# Patient Record
Sex: MALE | Race: WHITE | NOT HISPANIC OR LATINO | Employment: STUDENT | URBAN - METROPOLITAN AREA
[De-identification: names, ages, dates, MRNs, and addresses within clinical notes are randomized per-mention and may not be internally consistent; named-entity substitution may affect disease eponyms.]

---
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
## 2020-02-16 ENCOUNTER — HEALTH MAINTENANCE LETTER (OUTPATIENT)
Age: 20
End: 2020-02-16

## 2020-08-04 ENCOUNTER — OFFICE VISIT (OUTPATIENT)
Dept: FAMILY MEDICINE | Facility: CLINIC | Age: 20
End: 2020-08-04
Payer: COMMERCIAL

## 2020-08-04 VITALS
DIASTOLIC BLOOD PRESSURE: 72 MMHG | TEMPERATURE: 98.2 F | HEART RATE: 82 BPM | OXYGEN SATURATION: 98 % | WEIGHT: 171 LBS | HEIGHT: 70 IN | BODY MASS INDEX: 24.48 KG/M2 | SYSTOLIC BLOOD PRESSURE: 134 MMHG

## 2020-08-04 DIAGNOSIS — Z00.00 ROUTINE GENERAL MEDICAL EXAMINATION AT A HEALTH CARE FACILITY: Primary | ICD-10-CM

## 2020-08-04 PROCEDURE — 99395 PREV VISIT EST AGE 18-39: CPT | Performed by: FAMILY MEDICINE

## 2020-08-04 ASSESSMENT — ENCOUNTER SYMPTOMS
ENDOCRINE NEGATIVE: 1
CARDIOVASCULAR NEGATIVE: 1
EYES NEGATIVE: 1
PSYCHIATRIC NEGATIVE: 1
NEUROLOGICAL NEGATIVE: 1
ALLERGIC/IMMUNOLOGIC NEGATIVE: 1
CONSTITUTIONAL NEGATIVE: 1
HEMATOLOGIC/LYMPHATIC NEGATIVE: 1
GASTROINTESTINAL NEGATIVE: 1
RESPIRATORY NEGATIVE: 1
BACK PAIN: 1

## 2020-08-04 ASSESSMENT — MIFFLIN-ST. JEOR: SCORE: 1786.9

## 2020-08-04 ASSESSMENT — PAIN SCALES - GENERAL: PAINLEVEL: NO PAIN (0)

## 2020-08-04 NOTE — PROGRESS NOTES
SUBJECTIVE:   CC: Ravinder Bardales is an 19 year old male who presents for preventative health visit.   Comes for routine physical, sports physical.  He will be joining college he would be doing baseball.    He denies any history or family history of heart disease, has no chest pain, no short of breath.  Does not smoke.  Is very active.    Denies depression, no suicidal thoughts or ideation.  Denies alcohol abuse or drugs abuse.  He has no joint pain, no previous history of head injury or concussion    Healthy Habits:     Getting at least 3 servings of Calcium per day:  Yes    Bi-annual eye exam:  NO    Dental care twice a year:  Yes    Sleep apnea or symptoms of sleep apnea:  None    Diet:  Regular (no restrictions)    Frequency of exercise:  6-7 days/week    Duration of exercise:  Greater than 60 minutes    Taking medications regularly:  No    Barriers to taking medications:  None    Medication side effects:  None    PHQ-2 Total Score: 0    Additional concerns today:  Yes (Forms)    Today's PHQ-2 Score:   PHQ-2 ( 2000 [employer]) 8/4/2020   Q1: Little interest or pleasure in doing things 0   Q2: Feeling down, depressed or hopeless 0   PHQ-2 Score 0   Q1: Little interest or pleasure in doing things -   Q2: Feeling down, depressed or hopeless -   PHQ-2 Score -       Abuse: Current or Past(Physical, Sexual or Emotional)- No  Do you feel safe in your environment? No        Social History     Tobacco Use     Smoking status: Never Smoker     Smokeless tobacco: Never Used   Substance Use Topics     Alcohol use: No     If you drink alcohol do you typically have >3 drinks per day or >7 drinks per week? No    Alcohol Use 8/14/2018   Prescreen: >3 drinks/day or >7 drinks/week? Not Applicable   No flowsheet data found.    Last PSA: No results found for: PSA    Reviewed orders with patient. Reviewed health maintenance and updated orders accordingly - Yes  Patient Active Problem List   Diagnosis     Lymphadenopathy     Left knee pain      Past Surgical History:   Procedure Laterality Date     APPENDECTOMY       ARTHROSCOPY KNEE Left 11/6/2015    Procedure: ARTHROSCOPY KNEE;  Surgeon: Koko Babb MD;  [facility]     ARTHROSCOPY KNEE WITH FIXATION OF OSTEOCHONDRAL DISSECANS Left 3/17/2015    Procedure: ARTHROSCOPY KNEE WITH FIXATION OSTEOCHONDRITIS DESSICANS;  Surgeon: Koko Babb MD;  [facility]     NO HISTORY OF SURGERY       ORTHOPEDIC SURGERY       REMOVE HARDWARE KNEE Left 11/6/2015    Procedure: REMOVE HARDWARE KNEE;  Surgeon: Koko Babb MD;  [facility]       Social History     Tobacco Use     Smoking status: Never Smoker     Smokeless tobacco: Never Used   Substance Use Topics     Alcohol use: No     Family History   Problem Relation Age of Onset     Cancer Maternal Grandmother         liver cancer     Diabetes No family hx of      Hypertension No family hx of          Current Outpatient Medications   Medication Sig Dispense Refill     triamcinolone (KENALOG) 0.1 % cream Apply sparingly to affected area three times daily as needed (Patient not taking: Reported on 8/4/2020) 80 g 0     No Known Allergies    Reviewed and updated as needed this visit by clinical staff  Tobacco  Allergies  Meds  Med Hx  Surg Hx  Fam Hx  Soc Hx        Reviewed and updated as needed this visit by Provider        Past Medical History:   Diagnosis Date     Gestation period, 38 weeks       Past Surgical History:   Procedure Laterality Date     APPENDECTOMY       ARTHROSCOPY KNEE Left 11/6/2015    Procedure: ARTHROSCOPY KNEE;  Surgeon: Koko Babb MD;  [facility]     ARTHROSCOPY KNEE WITH FIXATION OF OSTEOCHONDRAL DISSECANS Left 3/17/2015    Procedure: ARTHROSCOPY KNEE WITH FIXATION OSTEOCHONDRITIS DESSICANS;  Surgeon: Koko Babb MD;  [facility]     NO HISTORY OF SURGERY       ORTHOPEDIC SURGERY       REMOVE HARDWARE KNEE Left 11/6/2015    Procedure:  "REMOVE HARDWARE KNEE;  Surgeon: Koko Babb MD;  [facility]     OB History   No obstetric history on file.       Review of Systems   Constitutional: Negative.    HENT: Negative.    Eyes: Negative.    Respiratory: Negative.    Cardiovascular: Negative.    Gastrointestinal: Negative.    Endocrine: Negative.    Genitourinary: Negative.    Musculoskeletal: Positive for back pain.   Skin: Negative.    Allergic/Immunologic: Negative.    Neurological: Negative.    Hematological: Negative.    Psychiatric/Behavioral: Negative.      CONSTITUTIONAL: NEGATIVE for fever, chills, change in weight  INTEGUMENTARY/SKIN: NEGATIVE for worrisome rashes, moles or lesions  EYES: NEGATIVE for vision changes or irritation  ENT: NEGATIVE for ear, mouth and throat problems  RESP: NEGATIVE for significant cough or SOB  CV: NEGATIVE for chest pain, palpitations or peripheral edema  GI: NEGATIVE for nausea, abdominal pain, heartburn, or change in bowel habits   male: negative for dysuria, hematuria, decreased urinary stream, erectile dysfunction, urethral discharge  MUSCULOSKELETAL: NEGATIVE for significant arthralgias or myalgia  NEURO: NEGATIVE for weakness, dizziness or paresthesias  ENDOCRINE: NEGATIVE for temperature intolerance, skin/hair changes  HEME/ALLERGY/IMMUNE: NEGATIVE for bleeding problems  PSYCHIATRIC: NEGATIVE for changes in mood or affect    OBJECTIVE:   /72 (BP Location: Right arm, Patient Position: Chair, Cuff Size: Adult Large)   Pulse 82   Temp 98.2  F (36.8  C) (Oral)   Ht 1.77 m (5' 9.69\")   Wt 77.6 kg (171 lb)   SpO2 98%   BMI 24.76 kg/m      Physical Exam  GENERAL: healthy, alert and no distress  EYES: Eyes grossly normal to inspection, PERRL and conjunctivae and sclerae normal  HENT: ear canals and TM's normal, nose and mouth without ulcers or lesions  NECK: no adenopathy, no asymmetry, masses, or scars and thyroid normal to palpation  RESP: lungs clear to auscultation - no rales, " "rhonchi or wheezes  CV: regular rate and rhythm, normal S1 S2, no S3 or S4, no murmur, click or rub, no peripheral edema and peripheral pulses strong  ABDOMEN: soft, nontender, no hepatosplenomegaly, no masses and bowel sounds normal   (male): normal male genitalia without lesions or urethral discharge, no hernia  MS: no gross musculoskeletal defects noted, no edema  SKIN: no suspicious lesions or rashes  NEURO: Normal strength and tone, mentation intact and speech normal  PSYCH: mentation appears normal, affect normal/bright  LYMPH: no cervical, supraclavicular, axillary, or inguinal adenopathy    Diagnostic Test Results:  Labs reviewed in Epic  none     ASSESSMENT/PLAN:       ICD-10-CM    1. Routine general medical examination at a health care facility  Z00.00    Normal physical exam,    I did fill his form.    COUNSELING:   Reviewed preventive health counseling, as reflected in patient instructions       Regular exercise       Healthy diet/nutrition       Vision screening       Safe sex practices/STD prevention    Estimated body mass index is 24.76 kg/m  as calculated from the following:    Height as of this encounter: 1.77 m (5' 9.69\").    Weight as of this encounter: 77.6 kg (171 lb).     Weight management plan: Discussed healthy diet and exercise guidelines     reports that he has never smoked. He has never used smokeless tobacco.      Counseling Resources:  ATP IV Guidelines  Pooled Cohorts Equation Calculator  FRAX Risk Assessment  ICSI Preventive Guidelines  Dietary Guidelines for Americans, 2010  USDA's MyPlate  ASA Prophylaxis  Lung CA Screening    Jerson Husain MD  [facility]  "

## 2020-11-29 ENCOUNTER — HEALTH MAINTENANCE LETTER (OUTPATIENT)
Age: 20
End: 2020-11-29

## 2021-07-06 ENCOUNTER — APPOINTMENT (OUTPATIENT)
Dept: MRI IMAGING | Facility: CLINIC | Age: 21
DRG: 065 | End: 2021-07-06
Attending: EMERGENCY MEDICINE
Payer: COMMERCIAL

## 2021-07-06 ENCOUNTER — HOSPITAL ENCOUNTER (INPATIENT)
Facility: CLINIC | Age: 21
LOS: 2 days | Discharge: HOME | DRG: 065 | End: 2021-07-08
Attending: EMERGENCY MEDICINE | Admitting: HOSPITALIST
Payer: COMMERCIAL

## 2021-07-06 ENCOUNTER — APPOINTMENT (OUTPATIENT)
Dept: CARDIOLOGY | Facility: CLINIC | Age: 21
DRG: 065 | End: 2021-07-06
Attending: EMERGENCY MEDICINE
Payer: COMMERCIAL

## 2021-07-06 DIAGNOSIS — I63.9 CEREBROVASCULAR ACCIDENT (CVA), UNSPECIFIED MECHANISM (H): ICD-10-CM

## 2021-07-06 LAB
ALBUMIN SERPL-MCNC: 4 G/DL (ref 3.4–5)
ALP SERPL-CCNC: 83 U/L (ref 40–150)
ALT SERPL W P-5'-P-CCNC: 39 U/L (ref 0–70)
AMPHETAMINES UR QL SCN: NEGATIVE
ANION GAP SERPL CALCULATED.3IONS-SCNC: 1 MMOL/L (ref 3–14)
AST SERPL W P-5'-P-CCNC: 23 U/L (ref 0–45)
BARBITURATES UR QL: NEGATIVE
BASOPHILS # BLD AUTO: 0 10E9/L (ref 0–0.2)
BASOPHILS NFR BLD AUTO: 0.4 %
BENZODIAZ UR QL: NEGATIVE
BILIRUB SERPL-MCNC: 2.4 MG/DL (ref 0.2–1.3)
BUN SERPL-MCNC: 12 MG/DL (ref 7–30)
CALCIUM SERPL-MCNC: 9.2 MG/DL (ref 8.5–10.1)
CANNABINOIDS UR QL SCN: NEGATIVE
CHLORIDE SERPL-SCNC: 107 MMOL/L (ref 94–109)
CHOLEST SERPL-MCNC: 127 MG/DL
CO2 SERPL-SCNC: 30 MMOL/L (ref 20–32)
COCAINE UR QL: NEGATIVE
CREAT SERPL-MCNC: 1.25 MG/DL (ref 0.66–1.25)
CRP SERPL-MCNC: <2.9 MG/L (ref 0–8)
D DIMER PPP FEU-MCNC: 0.3 UG/ML FEU (ref 0–0.5)
DIFFERENTIAL METHOD BLD: NORMAL
EOSINOPHIL # BLD AUTO: 0 10E9/L (ref 0–0.7)
EOSINOPHIL NFR BLD AUTO: 0.6 %
ERYTHROCYTE [DISTWIDTH] IN BLOOD BY AUTOMATED COUNT: 13.1 % (ref 10–15)
ERYTHROCYTE [SEDIMENTATION RATE] IN BLOOD BY WESTERGREN METHOD: 3 MM/H (ref 0–15)
GFR SERPL CREATININE-BSD FRML MDRD: 81 ML/MIN/{1.73_M2}
GLUCOSE BLDC GLUCOMTR-MCNC: 106 MG/DL (ref 70–99)
GLUCOSE BLDC GLUCOMTR-MCNC: 130 MG/DL (ref 70–99)
GLUCOSE SERPL-MCNC: 102 MG/DL (ref 70–99)
HBA1C MFR BLD: 5.2 % (ref 0–5.6)
HCT VFR BLD AUTO: 48.7 % (ref 40–53)
HDLC SERPL-MCNC: 58 MG/DL
HGB BLD-MCNC: 16.2 G/DL (ref 13.3–17.7)
IMM GRANULOCYTES # BLD: 0 10E9/L (ref 0–0.4)
IMM GRANULOCYTES NFR BLD: 0.4 %
INTERPRETATION ECG - MUSE: NORMAL
LABORATORY COMMENT REPORT: NORMAL
LDLC SERPL CALC-MCNC: 50 MG/DL
LVEF ECHO: NORMAL
LVEF ECHO: NORMAL
LYMPHOCYTES # BLD AUTO: 1.3 10E9/L (ref 0.8–5.3)
LYMPHOCYTES NFR BLD AUTO: 25.1 %
MCH RBC QN AUTO: 30.3 PG (ref 26.5–33)
MCHC RBC AUTO-ENTMCNC: 33.3 G/DL (ref 31.5–36.5)
MCV RBC AUTO: 91 FL (ref 78–100)
MONOCYTES # BLD AUTO: 0.3 10E9/L (ref 0–1.3)
MONOCYTES NFR BLD AUTO: 5.7 %
NEUTROPHILS # BLD AUTO: 3.6 10E9/L (ref 1.6–8.3)
NEUTROPHILS NFR BLD AUTO: 67.8 %
NONHDLC SERPL-MCNC: 69 MG/DL
NRBC # BLD AUTO: 0 10*3/UL
NRBC BLD AUTO-RTO: 0 /100
OPIATES UR QL SCN: NEGATIVE
PCP UR QL SCN: NEGATIVE
PLATELET # BLD AUTO: 247 10E9/L (ref 150–450)
POTASSIUM SERPL-SCNC: 4 MMOL/L (ref 3.4–5.3)
PROT SERPL-MCNC: 7.3 G/DL (ref 6.8–8.8)
RBC # BLD AUTO: 5.34 10E12/L (ref 4.4–5.9)
SARS-COV-2 RNA RESP QL NAA+PROBE: NEGATIVE
SODIUM SERPL-SCNC: 138 MMOL/L (ref 133–144)
SPECIMEN SOURCE: NORMAL
TRIGL SERPL-MCNC: 93 MG/DL
TROPONIN I SERPL-MCNC: <0.015 UG/L (ref 0–0.04)
TROPONIN I SERPL-MCNC: <0.015 UG/L (ref 0–0.04)
TSH SERPL DL<=0.005 MIU/L-ACNC: 1.1 MU/L (ref 0.4–4)
WBC # BLD AUTO: 5.3 10E9/L (ref 4–11)

## 2021-07-06 PROCEDURE — 80053 COMPREHEN METABOLIC PANEL: CPT | Performed by: EMERGENCY MEDICINE

## 2021-07-06 PROCEDURE — 93005 ELECTROCARDIOGRAM TRACING: CPT

## 2021-07-06 PROCEDURE — 84484 ASSAY OF TROPONIN QUANT: CPT | Performed by: HOSPITALIST

## 2021-07-06 PROCEDURE — 84484 ASSAY OF TROPONIN QUANT: CPT | Performed by: EMERGENCY MEDICINE

## 2021-07-06 PROCEDURE — 99285 EMERGENCY DEPT VISIT HI MDM: CPT | Mod: 25

## 2021-07-06 PROCEDURE — 85652 RBC SED RATE AUTOMATED: CPT | Performed by: EMERGENCY MEDICINE

## 2021-07-06 PROCEDURE — 85025 COMPLETE CBC W/AUTO DIFF WBC: CPT | Performed by: EMERGENCY MEDICINE

## 2021-07-06 PROCEDURE — 80307 DRUG TEST PRSMV CHEM ANLYZR: CPT | Performed by: EMERGENCY MEDICINE

## 2021-07-06 PROCEDURE — 93306 TTE W/DOPPLER COMPLETE: CPT | Mod: 26 | Performed by: INTERNAL MEDICINE

## 2021-07-06 PROCEDURE — 99223 1ST HOSP IP/OBS HIGH 75: CPT | Mod: AI | Performed by: HOSPITALIST

## 2021-07-06 PROCEDURE — A9585 GADOBUTROL INJECTION: HCPCS | Performed by: EMERGENCY MEDICINE

## 2021-07-06 PROCEDURE — 85379 FIBRIN DEGRADATION QUANT: CPT | Performed by: HOSPITALIST

## 2021-07-06 PROCEDURE — 72141 MRI NECK SPINE W/O DYE: CPT

## 2021-07-06 PROCEDURE — 999N000208 ECHOCARDIOGRAM COMPLETE

## 2021-07-06 PROCEDURE — 120N000001 HC R&B MED SURG/OB

## 2021-07-06 PROCEDURE — 80061 LIPID PANEL: CPT | Performed by: EMERGENCY MEDICINE

## 2021-07-06 PROCEDURE — 70549 MR ANGIOGRAPH NECK W/O&W/DYE: CPT

## 2021-07-06 PROCEDURE — 83036 HEMOGLOBIN GLYCOSYLATED A1C: CPT | Performed by: EMERGENCY MEDICINE

## 2021-07-06 PROCEDURE — 70553 MRI BRAIN STEM W/O & W/DYE: CPT

## 2021-07-06 PROCEDURE — C9803 HOPD COVID-19 SPEC COLLECT: HCPCS

## 2021-07-06 PROCEDURE — 36415 COLL VENOUS BLD VENIPUNCTURE: CPT | Performed by: HOSPITALIST

## 2021-07-06 PROCEDURE — 86140 C-REACTIVE PROTEIN: CPT | Performed by: EMERGENCY MEDICINE

## 2021-07-06 PROCEDURE — 255N000002 HC RX 255 OP 636: Performed by: EMERGENCY MEDICINE

## 2021-07-06 PROCEDURE — 87635 SARS-COV-2 COVID-19 AMP PRB: CPT | Performed by: EMERGENCY MEDICINE

## 2021-07-06 PROCEDURE — 250N000013 HC RX MED GY IP 250 OP 250 PS 637: Performed by: EMERGENCY MEDICINE

## 2021-07-06 PROCEDURE — 84443 ASSAY THYROID STIM HORMONE: CPT | Performed by: EMERGENCY MEDICINE

## 2021-07-06 PROCEDURE — 999N001017 HC STATISTIC GLUCOSE BY METER IP

## 2021-07-06 PROCEDURE — 70544 MR ANGIOGRAPHY HEAD W/O DYE: CPT

## 2021-07-06 RX ORDER — ASPIRIN 300 MG/1
300 SUPPOSITORY RECTAL DAILY
Status: DISCONTINUED | OUTPATIENT
Start: 2021-07-07 | End: 2021-07-08 | Stop reason: HOSPADM

## 2021-07-06 RX ORDER — ACETAMINOPHEN 325 MG/1
650 TABLET ORAL EVERY 4 HOURS PRN
Status: DISCONTINUED | OUTPATIENT
Start: 2021-07-06 | End: 2021-07-06

## 2021-07-06 RX ORDER — GADOBUTROL 604.72 MG/ML
8 INJECTION INTRAVENOUS ONCE
Status: COMPLETED | OUTPATIENT
Start: 2021-07-06 | End: 2021-07-06

## 2021-07-06 RX ORDER — ASPIRIN 325 MG
325 TABLET ORAL ONCE
Status: COMPLETED | OUTPATIENT
Start: 2021-07-06 | End: 2021-07-06

## 2021-07-06 RX ORDER — LIDOCAINE 40 MG/G
CREAM TOPICAL
Status: DISCONTINUED | OUTPATIENT
Start: 2021-07-06 | End: 2021-07-08 | Stop reason: HOSPADM

## 2021-07-06 RX ORDER — ACETAMINOPHEN 325 MG/1
650 TABLET ORAL EVERY 4 HOURS PRN
Status: DISCONTINUED | OUTPATIENT
Start: 2021-07-06 | End: 2021-07-08 | Stop reason: HOSPADM

## 2021-07-06 RX ADMIN — ASPIRIN 325 MG ORAL TABLET 325 MG: 325 PILL ORAL at 15:40

## 2021-07-06 RX ADMIN — GADOBUTROL 8 ML: 604.72 INJECTION INTRAVENOUS at 13:35

## 2021-07-06 ASSESSMENT — ACTIVITIES OF DAILY LIVING (ADL): ADLS_ACUITY_SCORE: 12

## 2021-07-06 ASSESSMENT — MIFFLIN-ST. JEOR: SCORE: 1826.82

## 2021-07-06 ASSESSMENT — ENCOUNTER SYMPTOMS
NUMBNESS: 1
SPEECH DIFFICULTY: 1
WEAKNESS: 1
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
## 2021-07-06 NOTE — CONSULTS
"    [facility]    Stroke Telephone Note    I was called by Rosemary Perry on 07/06/21 regarding patient Ravinder Bardales. The patient is a 20 year old male patient started having arm numbness and weakness on Saturday followed by difficulty with left arm on Sunday and some vision changes as well as word finding changes over the last several days.    Imaging Findings   pending    Impression  Pending  Potential for demyelinating process vs stroke    Recommendations   MRI neuro axis with and without contrast  If no acute or recent infarct, general neurology to follow    My recommendations are based on the information provided over the phone by Ravinder Bardales's in-person providers. They are not intended to replace the clinical judgment of his in-person providers. I was not requested to personally see or examine the patient at this time.    The Stroke Staff is Dr. Patricia.    Kodi Egan MD  Vascular Neurology Fellow  To page me or covering stroke neurology team member, click here: [facility]   Choose \"On Call\" tab at top, then search dropdown box for \"Neurology Adult\", select location, press Enter, then look for stroke/neuro ICU/telestroke.         "

## 2021-07-06 NOTE — PROGRESS NOTES
RECEIVING UNIT ED HANDOFF REVIEW    ED Nurse Handoff Report was reviewed by: Elaine Steven RN on July 6, 2021 at 4:59 PM

## 2021-07-06 NOTE — ED NOTES
[facility]  ED Nurse Handoff Report    ED Chief complaint: Chest Pain (chest tightness for past 2 weeks across middle of chest, better with a deep breath, patient relating it to weight lifting) and Extremity Weakness (on Friday had episodic numbness of right arm, visual blurriness.  hx of arm issues related to being a )      ED Diagnosis:   Final diagnoses:   Cerebrovascular accident (CVA), unspecified mechanism (H)       Code Status: Full Code    Allergies: No Known Allergies    Patient Story: Presents with arm numbness and weakness since Saturday, reported episode of aphasia.  Focused Assessment:  Alert and oriented x4. IV in L arm. In room air. Reports R sided extremity numbness. Independent with ADLs.    Labs Ordered and Resulted from Time of ED Arrival Up to the Time of Departure from the ED   GLUCOSE BY METER - Abnormal; Notable for the following components:       Result Value    Glucose 106 (*)     All other components within normal limits   COMPREHENSIVE METABOLIC PANEL - Abnormal; Notable for the following components:    Anion Gap 1 (*)     Glucose 102 (*)     Bilirubin Total 2.4 (*)     All other components within normal limits   CBC WITH PLATELETS DIFFERENTIAL   TROPONIN I   DRUG ABUSE SCREEN 77 URINE (FL, RH, SH)   ERYTHROCYTE SEDIMENTATION RATE AUTO   CRP INFLAMMATION   SARS-COV-2 (COVID-19) VIRUS RT-PCR     MRA Neck (Carotids) wo & w Contrast   Final Result   IMPRESSION:  Normal MR angiogram of the neck.          MARIA ANTONIA FALL MD            SYSTEM ID:  [id number]      MR Brain w/o & w Contrast   Final Result   IMPRESSION:     1. Several areas of abnormal cortical restricted diffusion in the left   cerebral hemisphere most suggestive of cortical infarcts. No evidence   of hemorrhagic transformation of infarct. No mass effect or midline   shift. No restricted diffusion in the right cerebral hemisphere.   2. Aside from the areas of apparent cortical infarcts, no other signal    abnormality within the brain parenchyma.         MARIA ANTONIA FALL MD            SYSTEM ID:  [id number]      MRA Brain ([place] [facility] [place]) wo Contrast   Final Result   IMPRESSION:  Normal MR angiogram of the head.           MARIA ANTONIA FALL MD            SYSTEM ID:  [id number]      MR Cervical Spine w/o Contrast   Final Result   IMPRESSION:     1. Mild degenerative disc changes in the mid cervical spine as   detailed above.   2. Mild right neural foraminal narrowing at C4-C5. No spinal canal   narrowing at any level.   3. No abnormal signal appreciated within the cervical spinal cord.          MARIA ANTONIA FALL MD            SYSTEM ID:  [id number]      Echocardiogram Complete    (Results Pending)         Treatments and/or interventions provided: Monitoring  Patient's response to treatments and/or interventions: Tolerated well    To be done/followed up on inpatient unit:  Continue with plan of care per admitting MD.    Does this patient have any cognitive concerns?: N/A    Activity level - Baseline/Home:  Independent  Activity Level - Current:   Independent    Patient's Preferred language: English   Needed?: No    Isolation: None  Infection: Not Applicable  Patient tested for COVID 19 prior to admission: YES  Bariatric?: No    Vital Signs:   Vitals:    07/06/21 1230 07/06/21 1245 07/06/21 1300 07/06/21 1315   BP: 134/69  134/76    Pulse: 58 61 60 59   Resp: 15 19  12   Temp:       TempSrc:       SpO2: 99% 99% 100% 99%   Weight:       Height:           Cardiac Rhythm:     Was the PSS-3 completed:   Yes  What interventions are required if any?               Family Comments: No family at bedside  OBS brochure/video discussed/provided to patient/family: N/A                For the majority of the shift this patient's behavior was Green.     ED NURSE PHONE NUMBER: [phone]

## 2021-07-06 NOTE — PHARMACY-ADMISSION MEDICATION HISTORY
Pharmacy Medication History  Admission medication history interview status for the 7/6/2021  admission is complete. See EPIC admission navigator for prior to admission medications     Location of Interview: Patient room  Medication history sources: Patient and Surescripts     Additional medication history information:    Patient takes a few supplements for working out (creatine, aminoacids). Did not add to list.     Medication reconciliation completed by provider prior to medication history? No    Time spent in this activity: 5 minutes     Prior to Admission medications    Not on File       The information provided in this note is only as accurate as the sources available at the time of update(s)     Precious Chandler, GeronimoD, BCCCP

## 2021-07-06 NOTE — ED PROVIDER NOTES
"  History   Chief Complaint:  Multiple neurologic complaints     HPI  Ravinder Bardales is an otherwise healthy 20 year old male who presents with multiple neurologic complaints. Ravinder's right arm went completely numb and was unable to move it three days ago, and then had an episode of stuttering and aphasia while on the phone with his dad. He also noticed that the vision in his right eye was going black intermittently while merging onto the highway on his motorcycle, so he called his doctor and subsequently presented to the ED. Ravinder reports that he has been \"a little stuttery\" since the episode of aphasia, as well, and says that his vision in his left eye was relatively normal while the right eye was losing vision. He also mentions that he has had some intermittent bilateral foot tingling in the last few weeks and numbness in the right leg such that he has felt clumsy and accidentally kicking things while at work. He denies having history of any speech problems, or history of other medical issues apart from sports injuries from playing baseball.    Review of Systems   Eyes: Positive for visual disturbance.   Neurological: Positive for speech difficulty, weakness and numbness.   10 systems reviewed and negative except as above and in HPI.      Allergies:  The patient has no known allergies.     Medications:  The patient denies currently taking medications.    Past Medical History:    Lymphadenopathy  Left knee pain    Past Surgical History:    Appendectomy  Arthroscopy, knee, left x2     Social History:  Works as  in [employer].  Plays baseball.   Occasionally drinks a few drinks of alcohol at a time .  Denies drug use.      Physical Exam     Patient Vitals for the past 24 hrs:   BP Temp Temp src Pulse Resp SpO2 Height Weight   07/06/21 1600 119/69 -- -- 63 -- 99 % -- --   07/06/21 1545 (!) 145/89 -- -- 69 -- 100 % -- --   07/06/21 1315 -- -- -- 59 12 99 % -- --   07/06/21 1300 134/76 -- -- 60 -- " "100 % -- --   07/06/21 1245 -- -- -- 61 19 99 % -- --   07/06/21 1230 134/69 -- -- 58 15 99 % -- --   07/06/21 1215 -- -- -- 58 -- 99 % -- --   07/06/21 1148 -- 98.3  F (36.8  C) Oral -- -- -- 1.778 m (5' 10\") 81.6 kg (179 lb 12.8 oz)   07/06/21 1146 (!) 143/86 -- Oral 63 14 100 % -- --       Physical Exam  General: Resting on the gurney, appears comfortable  Head:  The scalp, face, and head appear normal  Mouth/Throat: Mucus membranes are moist  CV:  Regular rate    Normal S1 and S2  No pathological murmur   Resp:  Breath sounds clear and equal bilaterally    Non-labored, no retractions or accessory muscle use    No coarseness    No wheezing   GI:  Abdomen is soft, no rigidity    No tenderness to palpation  MS:  Normal motor assessment of all extremities.    Good capillary refill noted.    No lower extremity redness, swelling, or excess warmth.   Skin:  No rash or lesions noted.  Neuro: Right upper extremity insensate but with good coordination. Is able to untie shoes and put on shoes without difficulty but does not feel a needle prick from an Accu-Chek. Sensation in left upper extremity is normal. Gait does show minimal weakness in the right leg. Bilateral lower extremities with abnormal heel scrape. No facial asymmetry.  Speech is normal and fluent.   Psych:  Awake. Alert.  Normal affect.      Appropriate interactions.      Emergency Department Course   ECG  ECG taken at 1152  Normal sinus rhythm with sinus arrhythmia  Normal ECG  Rate 62 bpm. PA interval 118 ms. QRS duration 78 ms. QT/QTc 368/373 ms. P-R-T axes 29 78 44.     Imaging:  MRA Neck (Carotids) wo & w Contrast  Normal MR angiogram of the neck.   MARIA ANTONIA FALL MD  Reading per radiology.    MR Brain w/o & w Contrast  1. Several areas of abnormal cortical restricted diffusion in the left  cerebral hemisphere most suggestive of cortical infarcts. No evidence  of hemorrhagic transformation of infarct. No mass effect or midline  shift. No restricted " diffusion in the right cerebral hemisphere.  2. Aside from the areas of apparent cortical infarcts, no other signal  abnormality within the brain parenchyma.  MARIA ANTONIA FALL MD   Reading per radiology.    MRA Brain ([place]) wo Contrast  Normal MR angiogram of the head.    MARIA ANTONIA FALL MD   Reading per radiology.    MR Cervical Spine w/o Contrast  1. Mild degenerative disc changes in the mid cervical spine as  detailed above.  2. Mild right neural foraminal narrowing at C4-C5. No spinal canal  narrowing at any level.  3. No abnormal signal appreciated within the cervical spinal cord.   MARIA ANTONIA FALL MD   Reading per radiology.    Echocardiogram Complete    (Results Pending)    Laboratory:   CBC: WBC: 5.3, HGB: 16.2, PLT: 247  CMP: Glucose 102 (H), Anion Gap: 1 (L), Bilirubin Total: 2.4 (H), o/w WNL (Creatinine: 1.25)  Troponin (Collected 1204): <0.015  Glucose by meter (Collected 1154): 106 (H)   Drug abuse screen 77 urine: AWNL    Asymptomatic COVID19 Virus PCR by nasopharyngeal swab pending   Erythrocyte sedimentation rate auto: 3  CRP: <2.9      Emergency Department Course:    Reviewed:  I reviewed nursing notes, vitals, past medical history and care everywhere    Assessments:  1151 I obtained history and examined the patient as noted above.   1441 I rechecked the patient and explained findings.   1508 I rechecked the patient again.    Consults:   1204 I spoke with stroke neurology regarding the patient.  1253 I spoke with radiology regarding the patient.  1437 I spoke with Dr. Brink, the hospitalist, who agreed to admit the patient.    Interventions:  1335 Gadobutrol 8 ml IV  1540 Aspirin 325 mg PO    Disposition:  The patient was admitted to the hospital under the care of Dr. Brink.       Impression & Plan   CMS Diagnoses: The patient has stroke symptoms:         ED Stroke specific documentation           NIHSS PDF     Patient last known well time: several days ago    Thrombolytics:   Not given due  to established infarct on imaging and unclear or unfavorable risk-benefit profile for extended window thrombolysis beyond the conventional 4.5 hour time window.    If treating with thrombolytics: Ensure SBP<180 and DBP<105 prior to treatment with thrombolytics.  Administering thrombolytics after treatment with IV labetalol, hydralazine, or nicardipine is reasonable once BP control is established.    Endovascular Retrieval:  Not initiated due to absence of proximal vessel occlusion    National Institutes of Health Stroke Scale (Baseline)     Score    Level of consciousness: (0)   Alert, keenly responsive    LOC questions: (0)   Answers both questions correctly    LOC commands: (0)   Performs both tasks correctly    Best gaze: (0)   Normal    Visual: (0)   No visual loss    Facial palsy: (0)   Normal symmetrical movements    Motor arm (left): (0)   No drift    Motor arm (right): (0)   No drift    Motor leg (left): (0)   No drift    Motor leg (right): (1)   Drift    Limb ataxia: (2)   Present in two limbs    Sensory: (2)   Severe to total sensory loss    Best language: (0)   Normal- no aphasia    Dysarthria: (0)   Normal    Extinction and inattention: (0)   No abnormality        Total Score:  5        Stroke Mimics were considered (including migraine headache, seizure disorder, hypoglycemia (or hyperglycemia), head or spinal trauma, CNS infection, Toxin ingestion and shock state (e.g. sepsis) .    Medical Decision Making:  Ravinder Bardales is a 20 year old male presents for evaluation of speech difficulty, gait abnormality, visual disturbance, and numbness.  His symptoms and exam are very concerning and I initially opted to get MRI of his entire neural axis.  After the MRI of the brain showed evidence of infarct the studies were changed to MRI MRA of the head and neck rather than obtaining MRI of the thoracic and lumbar spine as well.  The patient's symptoms started a few weeks ago and they are not in the window for  TPA.  A Code Stroke was not called, based on symptoms and timing.  The patient's symptoms are currently improved compared to when they first started. Per my evaluation and neurology recommendations, TPA was not given.  Other etiologies for the patient's symptoms, such as hypo/hyperglycemia, anemia, encephalitis, electrolyte abnormality, seizure, etc were considered, and evaluated as appropriate, however, stroke is more likely.  Stat echo ordered which results are pending.  No large vegetation seen per my review.  The patient was not seen by neurology.  The patient is to be admitted to the hospitalist Dr. Kelley for further management and treatment.    Critical Care Time: was 90 minutes for this patient excluding procedures    Covid-19  Ravinder Bardales was evaluated during a global COVID-19 pandemic, which necessitated consideration that the patient might be at risk for infection with the SARS-CoV-2 virus that causes COVID-19.   Applicable protocols for evaluation were followed during the patient's care.   COVID-19 was considered as part of the patient's evaluation. The plan for testing is:  a test was obtained during this visit.    Diagnosis:    ICD-10-CM    1. Cerebrovascular accident (CVA), unspecified mechanism (H)  I63.9 Drug abuse screen urine     Asymptomatic SARS-CoV-2 COVID-19 Virus (Coronavirus) by PCR     CRP inflammation     CRP inflammation     Erythrocyte sedimentation rate auto     Erythrocyte sedimentation rate auto     TSH with free T4 reflex     TSH with free T4 reflex     CANCELED: Erythrocyte sedimentation rate auto     CANCELED: CRP inflammation     CANCELED: TSH with free T4 reflex       Scribe Disclosure:  ISterling, am serving as a scribe at 11:51 AM on 7/6/2021 to document services personally performed by Rosemary Perry MD based on my observations and the provider's statements to me.      Rosemary Perry MD  07/06/21 [id number]

## 2021-07-07 ENCOUNTER — APPOINTMENT (OUTPATIENT)
Dept: CARDIOLOGY | Facility: CLINIC | Age: 21
DRG: 065 | End: 2021-07-07
Attending: INTERNAL MEDICINE
Payer: COMMERCIAL

## 2021-07-07 ENCOUNTER — APPOINTMENT (OUTPATIENT)
Dept: ULTRASOUND IMAGING | Facility: CLINIC | Age: 21
DRG: 065 | End: 2021-07-07
Attending: INTERNAL MEDICINE
Payer: COMMERCIAL

## 2021-07-07 ENCOUNTER — APPOINTMENT (OUTPATIENT)
Dept: MRI IMAGING | Facility: CLINIC | Age: 21
DRG: 065 | End: 2021-07-07
Attending: PSYCHIATRY & NEUROLOGY
Payer: COMMERCIAL

## 2021-07-07 LAB
APTT PPP: 27 SEC (ref 22–37)
GLUCOSE BLDC GLUCOMTR-MCNC: 101 MG/DL (ref 70–99)
INR PPP: 1.05 (ref 0.86–1.14)
LVEF ECHO: NORMAL

## 2021-07-07 PROCEDURE — 85303 CLOT INHIBIT PROT C ACTIVITY: CPT | Performed by: STUDENT IN AN ORGANIZED HEALTH CARE EDUCATION/TRAINING PROGRAM

## 2021-07-07 PROCEDURE — G0452 MOLECULAR PATHOLOGY INTERPR: HCPCS | Mod: GC | Performed by: PATHOLOGY

## 2021-07-07 PROCEDURE — 86147 CARDIOLIPIN ANTIBODY EA IG: CPT | Performed by: STUDENT IN AN ORGANIZED HEALTH CARE EDUCATION/TRAINING PROGRAM

## 2021-07-07 PROCEDURE — 93970 EXTREMITY STUDY: CPT

## 2021-07-07 PROCEDURE — 86146 BETA-2 GLYCOPROTEIN ANTIBODY: CPT | Performed by: STUDENT IN AN ORGANIZED HEALTH CARE EDUCATION/TRAINING PROGRAM

## 2021-07-07 PROCEDURE — 93325 DOPPLER ECHO COLOR FLOW MAPG: CPT

## 2021-07-07 PROCEDURE — 250N000011 HC RX IP 250 OP 636: Performed by: INTERNAL MEDICINE

## 2021-07-07 PROCEDURE — 81241 F5 GENE: CPT | Performed by: STUDENT IN AN ORGANIZED HEALTH CARE EDUCATION/TRAINING PROGRAM

## 2021-07-07 PROCEDURE — 85300 ANTITHROMBIN III ACTIVITY: CPT | Performed by: STUDENT IN AN ORGANIZED HEALTH CARE EDUCATION/TRAINING PROGRAM

## 2021-07-07 PROCEDURE — 255N000002 HC RX 255 OP 636: Performed by: PSYCHIATRY & NEUROLOGY

## 2021-07-07 PROCEDURE — 99232 SBSQ HOSP IP/OBS MODERATE 35: CPT | Performed by: INTERNAL MEDICINE

## 2021-07-07 PROCEDURE — 999N000184 HC STATISTIC TELEMETRY

## 2021-07-07 PROCEDURE — 999N000183 HC STATISTIC TEE INCLUDES SEDATION

## 2021-07-07 PROCEDURE — 81240 F2 GENE: CPT | Performed by: STUDENT IN AN ORGANIZED HEALTH CARE EDUCATION/TRAINING PROGRAM

## 2021-07-07 PROCEDURE — 999N001017 HC STATISTIC GLUCOSE BY METER IP

## 2021-07-07 PROCEDURE — A9585 GADOBUTROL INJECTION: HCPCS | Performed by: PSYCHIATRY & NEUROLOGY

## 2021-07-07 PROCEDURE — 85613 RUSSELL VIPER VENOM DILUTED: CPT | Performed by: STUDENT IN AN ORGANIZED HEALTH CARE EDUCATION/TRAINING PROGRAM

## 2021-07-07 PROCEDURE — 250N000009 HC RX 250: Performed by: INTERNAL MEDICINE

## 2021-07-07 PROCEDURE — 85306 CLOT INHIBIT PROT S FREE: CPT | Performed by: STUDENT IN AN ORGANIZED HEALTH CARE EDUCATION/TRAINING PROGRAM

## 2021-07-07 PROCEDURE — 99223 1ST HOSP IP/OBS HIGH 75: CPT | Mod: GC | Performed by: PSYCHIATRY & NEUROLOGY

## 2021-07-07 PROCEDURE — 99152 MOD SED SAME PHYS/QHP 5/>YRS: CPT | Performed by: INTERNAL MEDICINE

## 2021-07-07 PROCEDURE — 85730 THROMBOPLASTIN TIME PARTIAL: CPT | Performed by: STUDENT IN AN ORGANIZED HEALTH CARE EDUCATION/TRAINING PROGRAM

## 2021-07-07 PROCEDURE — 85610 PROTHROMBIN TIME: CPT | Performed by: STUDENT IN AN ORGANIZED HEALTH CARE EDUCATION/TRAINING PROGRAM

## 2021-07-07 PROCEDURE — 85390 FIBRINOLYSINS SCREEN I&R: CPT | Mod: 26 | Performed by: PATHOLOGY

## 2021-07-07 PROCEDURE — 93312 ECHO TRANSESOPHAGEAL: CPT | Mod: 26 | Performed by: INTERNAL MEDICINE

## 2021-07-07 PROCEDURE — 93325 DOPPLER ECHO COLOR FLOW MAPG: CPT | Mod: 26 | Performed by: INTERNAL MEDICINE

## 2021-07-07 PROCEDURE — 72198 MR ANGIO PELVIS W/O & W/DYE: CPT

## 2021-07-07 PROCEDURE — 120N000001 HC R&B MED SURG/OB

## 2021-07-07 PROCEDURE — 93320 DOPPLER ECHO COMPLETE: CPT | Mod: 26 | Performed by: INTERNAL MEDICINE

## 2021-07-07 PROCEDURE — 93320 DOPPLER ECHO COMPLETE: CPT

## 2021-07-07 PROCEDURE — 999N000226 HC STATISTIC SLP IP EVAL DEFER: Performed by: SPEECH-LANGUAGE PATHOLOGIST

## 2021-07-07 PROCEDURE — 99207 PR CDG-MDM COMPONENT: MEETS MODERATE - DOWN CODED: CPT | Performed by: INTERNAL MEDICINE

## 2021-07-07 PROCEDURE — 258N000003 HC RX IP 258 OP 636: Performed by: INTERNAL MEDICINE

## 2021-07-07 PROCEDURE — 999N001035 HC STATISTIC THROMBIN TIME NC: Performed by: STUDENT IN AN ORGANIZED HEALTH CARE EDUCATION/TRAINING PROGRAM

## 2021-07-07 PROCEDURE — 250N000013 HC RX MED GY IP 250 OP 250 PS 637: Performed by: HOSPITALIST

## 2021-07-07 PROCEDURE — 36415 COLL VENOUS BLD VENIPUNCTURE: CPT | Performed by: STUDENT IN AN ORGANIZED HEALTH CARE EDUCATION/TRAINING PROGRAM

## 2021-07-07 RX ORDER — LIDOCAINE 40 MG/G
CREAM TOPICAL
Status: DISCONTINUED | OUTPATIENT
Start: 2021-07-07 | End: 2021-07-07 | Stop reason: CLARIF

## 2021-07-07 RX ORDER — LIDOCAINE HYDROCHLORIDE 40 MG/ML
1.5 SOLUTION TOPICAL ONCE
Status: COMPLETED | OUTPATIENT
Start: 2021-07-07 | End: 2021-07-07

## 2021-07-07 RX ORDER — DEXTROSE MONOHYDRATE 25 G/50ML
9.5 INJECTION, SOLUTION INTRAVENOUS
Status: DISCONTINUED | OUTPATIENT
Start: 2021-07-07 | End: 2021-07-07 | Stop reason: CLARIF

## 2021-07-07 RX ORDER — GLYCOPYRROLATE 0.2 MG/ML
0.1 INJECTION, SOLUTION INTRAMUSCULAR; INTRAVENOUS ONCE
Status: COMPLETED | OUTPATIENT
Start: 2021-07-07 | End: 2021-07-07

## 2021-07-07 RX ORDER — NALOXONE HYDROCHLORIDE 0.4 MG/ML
0.4 INJECTION, SOLUTION INTRAMUSCULAR; INTRAVENOUS; SUBCUTANEOUS
Status: DISCONTINUED | OUTPATIENT
Start: 2021-07-07 | End: 2021-07-07 | Stop reason: CLARIF

## 2021-07-07 RX ORDER — LIDOCAINE 50 MG/G
OINTMENT TOPICAL ONCE
Status: COMPLETED | OUTPATIENT
Start: 2021-07-07 | End: 2021-07-07

## 2021-07-07 RX ORDER — SODIUM CHLORIDE 9 MG/ML
INJECTION, SOLUTION INTRAVENOUS CONTINUOUS PRN
Status: DISCONTINUED | OUTPATIENT
Start: 2021-07-07 | End: 2021-07-07

## 2021-07-07 RX ORDER — FENTANYL CITRATE 50 UG/ML
25 INJECTION, SOLUTION INTRAMUSCULAR; INTRAVENOUS
Status: DISCONTINUED | OUTPATIENT
Start: 2021-07-07 | End: 2021-07-07 | Stop reason: CLARIF

## 2021-07-07 RX ORDER — FLUMAZENIL 0.1 MG/ML
0.2 INJECTION, SOLUTION INTRAVENOUS
Status: DISCONTINUED | OUTPATIENT
Start: 2021-07-07 | End: 2021-07-07 | Stop reason: CLARIF

## 2021-07-07 RX ORDER — FENTANYL CITRATE 50 UG/ML
50 INJECTION, SOLUTION INTRAMUSCULAR; INTRAVENOUS ONCE
Status: COMPLETED | OUTPATIENT
Start: 2021-07-07 | End: 2021-07-07

## 2021-07-07 RX ORDER — NALOXONE HYDROCHLORIDE 0.4 MG/ML
0.2 INJECTION, SOLUTION INTRAMUSCULAR; INTRAVENOUS; SUBCUTANEOUS
Status: DISCONTINUED | OUTPATIENT
Start: 2021-07-07 | End: 2021-07-07 | Stop reason: CLARIF

## 2021-07-07 RX ORDER — GADOBUTROL 604.72 MG/ML
15 INJECTION INTRAVENOUS ONCE
Status: COMPLETED | OUTPATIENT
Start: 2021-07-07 | End: 2021-07-07

## 2021-07-07 RX ADMIN — MIDAZOLAM HYDROCHLORIDE 0.5 MG: 1 INJECTION, SOLUTION INTRAMUSCULAR; INTRAVENOUS at 10:53

## 2021-07-07 RX ADMIN — ACETAMINOPHEN 650 MG: 325 TABLET, FILM COATED ORAL at 00:11

## 2021-07-07 RX ADMIN — TOPICAL ANESTHETIC 0.5 ML: 200 SPRAY DENTAL; PERIODONTAL at 10:27

## 2021-07-07 RX ADMIN — FENTANYL CITRATE 50 MCG: 50 INJECTION, SOLUTION INTRAMUSCULAR; INTRAVENOUS at 10:42

## 2021-07-07 RX ADMIN — ASPIRIN 325 MG: 325 TABLET, COATED ORAL at 09:23

## 2021-07-07 RX ADMIN — SODIUM CHLORIDE: 9 INJECTION, SOLUTION INTRAVENOUS at 10:45

## 2021-07-07 RX ADMIN — LIDOCAINE HYDROCHLORIDE 1.5 ML: 40 SOLUTION TOPICAL at 10:24

## 2021-07-07 RX ADMIN — MIDAZOLAM HYDROCHLORIDE 1 MG: 1 INJECTION, SOLUTION INTRAMUSCULAR; INTRAVENOUS at 10:44

## 2021-07-07 RX ADMIN — GADOBUTROL 15 ML: 604.72 INJECTION INTRAVENOUS at 18:26

## 2021-07-07 RX ADMIN — MIDAZOLAM HYDROCHLORIDE 2 MG: 1 INJECTION, SOLUTION INTRAMUSCULAR; INTRAVENOUS at 10:41

## 2021-07-07 RX ADMIN — FENTANYL CITRATE 25 MCG: 50 INJECTION, SOLUTION INTRAMUSCULAR; INTRAVENOUS at 10:44

## 2021-07-07 RX ADMIN — MIDAZOLAM HYDROCHLORIDE 1 MG: 1 INJECTION, SOLUTION INTRAMUSCULAR; INTRAVENOUS at 10:45

## 2021-07-07 RX ADMIN — MIDAZOLAM HYDROCHLORIDE 1 MG: 1 INJECTION, SOLUTION INTRAMUSCULAR; INTRAVENOUS at 10:48

## 2021-07-07 RX ADMIN — FENTANYL CITRATE 25 MCG: 50 INJECTION, SOLUTION INTRAMUSCULAR; INTRAVENOUS at 10:46

## 2021-07-07 RX ADMIN — MIDAZOLAM HYDROCHLORIDE 0.5 MG: 1 INJECTION, SOLUTION INTRAMUSCULAR; INTRAVENOUS at 10:51

## 2021-07-07 RX ADMIN — GLYCOPYRROLATE 0.1 MG: 0.2 INJECTION, SOLUTION INTRAMUSCULAR; INTRAVENOUS at 10:26

## 2021-07-07 ASSESSMENT — ACTIVITIES OF DAILY LIVING (ADL)
ADLS_ACUITY_SCORE: 12
ADLS_ACUITY_SCORE: 14
ADLS_ACUITY_SCORE: 12
ADLS_ACUITY_SCORE: 14

## 2021-07-07 NOTE — PRE-PROCEDURE
GENERAL PRE-PROCEDURE:   Procedure:  Transesophageal echocardiogram   Date/Time:  7/7/2021 10:39 AM    Verbal consent obtained?: Yes    Written consent obtained?: Yes    Risks and benefits: Risks, benefits and alternatives were discussed    Consent given by:  Patient  Patient states understanding of procedure being performed: Yes    Patient's understanding of procedure matches consent: Yes    Procedure consent matches procedure scheduled: Yes    Expected level of sedation:  Moderate  Appropriately NPO:  Yes  ASA Class:  Class 2- mild systemic disease, no acute problems, no functional limitations  Mallampati  :  Grade 2- soft palate, base of uvula, tonsillar pillars, and portion of posterior pharyngeal wall visible  Lungs:  Lungs clear with good breath sounds bilaterally  Heart:  Normal heart sounds and rate  History & Physical reviewed:  History and physical reviewed and no updates needed  Statement of review:  I have reviewed the lab findings, diagnostic data, medications, and the plan for sedation    Emmanuel Garibay MD, [facility]  Cardiology  Text Page   July 7, 2021

## 2021-07-07 NOTE — PLAN OF CARE
"Pt here with cortical strokes. Fx of concussion in Oct 2020. Per patient he did not start \"brain therapy\" until months later, and has had problems with sleeping since the concussion. A&O times 4. Neuro's intact, patient complained last evening of difficulty with focusing his eyes, but improved now. VSS. Tele NSR. On a regular diet. Up independently in room.  Tylenol given for mild headache. Pt reported no symptoms at 0400 assessment. Scoring green on the Aggression Stop Light Tool. Neurology and hospitalist following.   "

## 2021-07-07 NOTE — CONSULTS
Care Management Initial Consult    General Information  Assessment completed with: Patient, Parents,    Type of CM/SW Visit: Initial Assessment    Primary Care Provider verified and updated as needed: Yes - Pt has no PCP (Father sees physician at [facility] office)  Readmission within the last 30 days:        Reason for Consult: discharge planning  Advance Care Planning:     No HCD completed. SW discussed resources       Communication Assessment  Patient's communication style: spoken language (English or Bilingual)    Hearing Difficulty or Deaf: no   Wear Glasses or Blind: no    Cognitive  Cognitive/Neuro/Behavioral: WDL  Level of Consciousness: alert     Orientation: oriented x 4  Mood/Behavior: cooperative, calm  Best Language: 0 - No aphasia  Speech: logical, spontaneous, clear    Living Environment:   People in home: parent(s)   Pt lives w/Mother in her home  Current living Arrangements: house      Able to return to prior arrangements:  TBD       Family/Social Support:  Care provided by: self  Provides care for: no one  Marital Status: Single  Parent(s)          Description of Support System: Supportive, Involved    Support Assessment: Adequate family and caregiver support, Adequate social supports    Current Resources:   Patient receiving home care services:  N/A     Community Resources:    Equipment currently used at home:    Supplies currently used at home:      Employment/Financial:  Employment Status: employed full-time        Financial Concerns:   Pt has insurance under his father's employer          Lifestyle & Psychosocial Needs:        Socioeconomic History     Marital status: Single     Spouse name: Not on file     Number of children: Not on file     Years of education: Not on file     Highest education level: Not on file     Tobacco Use     Smoking status: Never Smoker     Smokeless tobacco: Never Used   Substance and Sexual Activity     Alcohol use: Yes     Comment: occassionally     Drug use:  No     Sexual activity: Not Currently     Partners: Female     Birth control/protection: Pill, Condom       Functional Status:  Prior to admission patient needed assistance:       Pt reports being independent w/all ADL's and IADL's prior to this admission       Mental Health Status:  Mental Health Status: No Current Concerns       Chemical Dependency Status:  Chemical Dependency Status: No Current Concerns             Values/Beliefs:  Spiritual, Cultural Beliefs, Religion Practices, Values that affect care:                 Additional Information:    SW reviewed chart and met w/patient to discuss discharge planning. Pt ws admitted 7/6/21 with CVA. Anticipated discharge date 7/8. SW introduced self and role. Pt stated he resides w/his mother in her [place], [place] home. 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 Pt's insurance is under his father's employer. Pt has not yet been seen by therapies, however he reports he is ambulating independently in his room at [facility].  Pt plans to discharge home w/a parent transporting.  Pt/parents had no further questions for SW. No SW interventions anticipated at this time. Will be available if needs arise.      FRITZ Kessler   [facility]  [phone]

## 2021-07-07 NOTE — PLAN OF CARE
Patient here with multiple strokes, alert x 4, numbness in right hand and fingers, difficulty with visual focusing, right side weakness, slow and deliberate heel to shin , denies chest tightness at this time. Patient had TTE in ED  and showed mildly positive PFO. Patient up in room independent. Continue stroke work up tomorrow

## 2021-07-07 NOTE — PLAN OF CARE
OT/PT: Orders received. Chart reviewed and discussed with RN.  Per RN and chart, pt up independently in his room, all visual and sensory deficits have resolved. Pt currently with no symptoms, no deficits warranting OT/PT eval. Will complete order, please re-order if necessary

## 2021-07-07 NOTE — PHARMACY-CONSULT NOTE
Pharmacy Consult to evaluate for medication related stroke core measures    Ravinder Bardales, 20 year old male admitted for ischemic stroke on 7/6/2021.    Thrombolytic was not given because of Time from onset contraindications    VTE Prophylaxis SCDs /PCDs placed on 7/6, as appropriate prior to end of hospital day 2.    Antithrombotic: aspirin started on 7/6, as appropriate by end of hospital day 2. Continue antithrombotic therapy on discharge to meet quality measures, unless contraindicated.    Anticoagulation if history of A-fib/flutter: Patient does not have history of A-fib/flutter - anticoagulation not required for medication related stroke core measures.     LDL Cholesterol Calculated   Date Value Ref Range Status   07/06/2021 50 <100 mg/dL Final     Comment:     Desirable:       <100 mg/dl       Pt is not on a statin, lipid panel checked and no results outside normal range. PFO determined to be culprit for stroke in 20 yr old so statin not indicated at this time. Anticipate PFO closure will be planned for stroke prevention.    Recommendations: None at this time    Thank you for the consult.    Trena Darling [facility] 7/6/2021 9:21 PM

## 2021-07-07 NOTE — PROGRESS NOTES
Care Suites Procedure Nursing Note    Patient Information  Name: Ravinder Bardales  Age: 20 year old    Procedure    Procedure: PHUONG  Procedure start time: 10:35 am  Procedure complete time: 11:05 am  Concerns/abnormal assessment: None  Pt tolerated well. VSS.  See PHUONG Flowsheet.   Total sedation given 6 mg Versed & 100 mcg Fentanyl.   Dr. Garibay spoke with pt post procedure.   Detailed report called to BAL Jordan. Pt to be NPO until 13:00. Both pt & nurse informed.  Pt transferred to [facility] per cart.  Pt  A/O. Resp even & unlabored upon transfer.

## 2021-07-07 NOTE — PLAN OF CARE
Stroke.  Neuros - alert & oriented/following commands, no aphasia-resolved; no vision deficits, denies dizziness or shortness of breath; no numbness or tingling - numbness in 3 fingers in right hand resolved.  VSS. Tele - Sinus luc/sinus dysrythmia 50s/no chest discomfort.  Advanced to regular diet/pills whole.  Continent of bowel and bladder/voiding in bathroom. Up independently.  Denies pain. Pt scoring green on the Aggression Stop Light Tool. Left calf cramping persists/comfortable at rest - doppler completed.

## 2021-07-07 NOTE — CONSULTS
[facility]    Stroke Consult Note    Reason for Consult:  Stroke    Chief Complaint: Chest Pain (chest tightness for past 2 weeks across middle of chest, better with a deep breath, patient relating it to weight lifting) and Extremity Weakness (on Friday had episodic numbness of right arm, visual blurriness.  hx of arm issues related to being a )       ANUJ Bardales is a 20 year old male with past medical history of previous knee surgery.  Patient presents after a series of neurologic complaints which started Saturday.  He states symptoms first started while he was Valsalva while using the restroom and started with an electric-like sensation that went down the right arm leading to several minutes of arm weakness and and more prolonged numbness.  This was followed by word finding difficulty where he was able to understand everyone he just was unable to get the words out and had stuttering expressive speech.  This was followed by right eye vision loss described as dark coming from the superior vision and extending to the lower vision and resolving over a period of minutes leaving some blurry vision before fully resolving.  The timing of the symptoms lasted approximately 20 minutes total with some prolonged numbness in the right arm.  A day later Sunday 7/4 patient was noted to have a left arm numbness and weakness which resolved.  Monday 7/5 patient was riding his motorcycle and he had a repeat of vision loss similar in semiology as above.      Stroke Summary and Impression  Acute ischemic stroke of L MCA due to cardioembolism  Patient of such a young age with PFO found on echocardiogram likely secondary to embolism/cardioembolism/paradoxical embolism.     Assessment   CT Head Late presentation, not performed   MRI    Head Vessel Imaging  no significant stenosis   Neck Vessel Imaging  no significant stenosis   Cardiac TTE:   The visual ejection fraction is  55-60%.  The right ventricle is normal in size and function.  A contrast injection (Bubble Study) was performed that was mildly positive for  flow across the interatrial septum.  No significant valve disease.  PHUONG:  There is a small patent foramen ovale (PFO) present. There is mild bi-  directional shunting present, with Left to Right shunting on color Doppler,  and Right to Left shunting with a mildly positive bubble study with the  Valsalva maneuver only. Bubble study was negative at rest. Right atrial aspect  defect measures 0.1 cm. Left atrial aspect defect measures 0.1 cm. Tunnel  length measures 1.2 cm. Aortic valve rim (aortic short-axis) measures 0.9 cm.  AV valve rim (4-chamber) measures 1.1 cm. SVC rim (bi-caval) measures 1.3 cm.  Interatrial septum is not aneurysmal, there are no fenestrations.  Sweep image obtained (labeled SWEEP).     Left ventricular size, global systolic function, and wall motion are normal,  estimated LVEF 55-60%.  Right ventricular global function is normal.  No significant valvular abnormalities.  The left atrial appendage was well visualized and free of thrombus.   EKG Sinus   Blood Pressure Goal: < 140/80   Antiplt/Anticoag    LDL Lab Results   Component Value Date/Time    LDL 50 07/06/2021 12:04 PM      A1C Lab Results   Component Value Date/Time    A1C 5.2 07/06/2021 12:04 PM      Troponin Lab Results   Component Value Date/Time    TROPI <0.015 07/06/2021 05:46 PM    TROPI <0.015 07/06/2021 12:04 PM              PLAN    Further Imaging MRV Pelvis   Blood Pressure Goal: < 140/80   Antiplt/Anticoag > Started Asa 81mg qday   LDL   > Goal < 70  > At goal   A1C Defer to primary team, goal A1c < 7   Tobacco Recommend tobacco cessation if applicable   Rehab Physical Therapy   Occupational Therapy    Speech Therapy    Secondary Stroke Prevention - Alcohol consumption: men< or= 2 drinks per day, nonpregnant women< or= 1 drink per day  - Physical activity: at least 30 minutes of moderate  "intensity exercise 1-3 times per week  - Diet: low fat, low sodium, Mediterranean diet  - Goal BMI 18.5-25                  Patient Follow-up    - final recommendation pending work-up    Thank you for this consult. We will continue to follow.     The Stroke Staff is Dr. Patricia.    Kodi Egan MD  Vascular Neurology Fellow  To page me or covering stroke neurology team member, click here: [facility]   Choose \"On Call\" tab at top, then search dropdown box for \"Neurology Adult\", select location, press Enter, then look for stroke/neuro ICU/telestroke.    _____________________________________________________    Risk Factors Present on Admission                   Past Medical History   History reviewed. No pertinent past medical history.  Past Surgical History   Past Surgical History:   Procedure Laterality Date     APPENDECTOMY       ARTHROSCOPY KNEE Left 11/6/2015    Procedure: ARTHROSCOPY KNEE;  Surgeon: Koko Babb MD;  [facility]     ARTHROSCOPY KNEE WITH FIXATION OF OSTEOCHONDRAL DISSECANS Left 3/17/2015    Procedure: ARTHROSCOPY KNEE WITH FIXATION OSTEOCHONDRITIS DESSICANS;  Surgeon: Koko Babb MD;  [facility]     NO HISTORY OF SURGERY       ORTHOPEDIC SURGERY       REMOVE HARDWARE KNEE Left 11/6/2015    Procedure: REMOVE HARDWARE KNEE;  Surgeon: Koko Babb MD;  [facility]     Medications   Home Meds  Prior to Admission medications    Not on File       Scheduled Meds    aspirin  325 mg Oral Daily    Or     aspirin  324 mg Oral or NG Tube Daily    Or     aspirin  300 mg Rectal Daily     sodium chloride (PF)  3 mL Intracatheter Q8H       Infusion Meds    - MEDICATION INSTRUCTIONS -       - MEDICATION INSTRUCTIONS -         PRN Meds  acetaminophen, lidocaine 4%, lidocaine (buffered or not buffered), - MEDICATION INSTRUCTIONS -, - MEDICATION INSTRUCTIONS -, sodium chloride (PF)    Allergies   No Known Allergies  Family History   Family History   Problem " Relation Age of Onset     Cancer Maternal Grandmother         liver cancer     Diabetes No family hx of      Hypertension No family hx of      Social History   Social History     Tobacco Use     Smoking status: Never Smoker     Smokeless tobacco: Never Used   Substance Use Topics     Alcohol use: Yes     Comment: occassionally     Drug use: No       Review of Systems   The 10 point Review of Systems is negative other than noted in the HPI or here.        PHYSICAL EXAMINATION   Temp:  [98  F (36.7  C)-98.6  F (37  C)] 98.2  F (36.8  C)  Pulse:  [54-72] 55  Resp:  [16-18] 16  BP: ()/(49-89) 133/79  SpO2:  [92 %-100 %] 100 %    General physical exam:    HEENT: normocephalic, eyes open with no discharge, nares patent, oropharynx is clear with no lesions, palate intact  CV: regular rate  Chest: normal configuration, no respiratory distress  Ab: soft, non-distended  Skin: no rashes or lesions    Neuro exam:    Mental status:   Awake, alert, and oriented to person, place, time and location/situation    Speech:   Normal Abnormal   Fluency X    Comprehension X    Articulation X    Repetition X    Naming X      Cranial Nerves:   Normal Abnormal   II Pupils equal (3mm), round,  reactive.   Visual Fields are full to confrontation    Ill, IV, VI EOMI, no nystagmus    V Intact to LT in V1-3    VII Face symmetrical, eye closure  symmetrical    VIII Hearing intact to voice    IX,X -    XI Shrug equal    XII Tongue midline        Muscle/motor:   Tone: Normal   Bulk: Normal   Fasciculations: None observed      Pronator drift: Absent    Neck Flexion    Neck Extension         Right Left  Right Left   Shoulder abductors: 5 5 Hip flexors: 5 5   Elbow flexors: 5 5 Hip abductors:     Elbow extensors: 5 5 Hip extensors:     Wrist extensors: 5 5 Hip adductors:     Wrist flexors: 5 5 Knee flexors: 5 5   Finger flexors: 5 5 Knee extensors: 5 5   Finger extensors:   Ankle plantar flexors: 5 5   Finger abductors:   Ankle dorsiflexors: 5 5    Thumb abductors:   Ankle inversion:        Ankle eversion:        Toe extensors:        Toe flexors:       No abnormal movements, rigidity or spasticity        Sensation:   Normal  RUE LUE RLE LLE   Light Touch x             Coordination:     Normal  Abnormal Right Abnormal Left   Finger to Nose x     Rapid Alternating      Heel to Shin x     Finger Tap      Foot Tap      Other          Gait and Station:   Deferred         Dysphagia Screen  Per Nursing  Modified Wendy Score (Pre-morbid)  0-No symptoms    Imaging  I personally reviewed all imaging; relevant findings per HPI.    Labs Data   CBC  Recent Labs   Lab 07/06/21  1204   WBC 5.3   RBC 5.34   HGB 16.2   HCT 48.7        Basic Metabolic Panel   Recent Labs   Lab 07/06/21  1204      POTASSIUM 4.0   CHLORIDE 107   CO2 30   BUN 12   CR 1.25   *   MAYA 9.2     Liver Panel  Recent Labs   Lab 07/06/21  1204   PROTTOTAL 7.3   ALBUMIN 4.0   BILITOTAL 2.4*   ALKPHOS 83   AST 23   ALT 39     INR    Recent Labs   Lab Test 07/07/21  1241   INR 1.05      Lipid Profile    Recent Labs   Lab Test 07/06/21  1204   CHOL 127   HDL 58   LDL 50   TRIG 93     A1C    Recent Labs   Lab Test 07/06/21  1204   A1C 5.2     Troponin I    Recent Labs   Lab 07/06/21  1746 07/06/21  1204   TROPI <0.015 <0.015

## 2021-07-07 NOTE — PROGRESS NOTES
[facility]    Hospitalist Progress Note    Brief Summary:  Ravinder Bardales is a 20 year old male with no significant past medical history who presents with a variety of neurological symptoms over the last 2 weeks and is admitted with acute stroke.      Assessment & Plan        Multiple acute ischemic strokes  Patient presented with variety of neurological symptoms over the last 2 weeks [right arm numbness, right eye blurred vision, left arm numbness, loss of balance], fatigue and chest tightness.   He had a MRI of the brain with and without contrast shows multiple ischemic stroke involving left frontal lobe, left parietal lobe and left occipital lobe and left postcentral gyrus in the vicinity of the hand knob.     Likely his symptoms related to stroke, likely embolic. He had TTE done which is positive for bubble study.   He is on Aspirin 325 mg daily for now. Labs work looks normal. Normal electrolytes, renal function, cbc, LFT's and Lipid panel. Hemoglobin A1c is 5.2     PHUONG done this morning shows small PFO with bidirectional shunt, most likely the cause of his stroke. US venous lower extremity order and pending at this time.   No family or personal history of VTE, no other significant family history either.   Neurology stroke service is consulted and following. Keep him on telemetry, currently in NSR.   MRA of the head and neck normal.   I will do the thrombophillia work up and send Protein C and S, Prothrombin gene mutation, Factor V leiden, cardiolipin antibody, beta 2 glycoprotein IgG and IgM, antithrombin 3 and lupus anticoagulant.     Patient has no symptoms at this time, await for final neurology recommendations.       Chest tightness  EKG and troponin normal.  Echo shows EF of 55-60%, normal diastolic function, no WMA.  No significant valve disease.   No more chest pain or tightness at this time. .        DVT Prophylaxis: Pneumatic Compression Devices and ambulation   Code Status:  Full Code    Disposition: Expected discharge tomorrow.     Homero Yi MD  Text Page  (7am - 6pm)    Interval History   Patient feeling much better and offer no complaints this morning. Denies any headache, dizziness, blurry vision, numbness, tingling or weakness at this time. No chest pain or tightness.     No other significant event overnight.     -Data reviewed today: I reviewed all new labs and imaging results over the last 24 hours. I personally reviewed no images or EKG's today.    Physical Exam   Temp: 98.2  F (36.8  C) Temp src: Oral BP: 133/79 Pulse: 55   Resp: 16 SpO2: 100 % O2 Device: None (Room air) Oxygen Delivery: 2 LPM  Vitals:    07/06/21 1148   Weight: 81.6 kg (179 lb 12.8 oz)     Vital Signs with Ranges  Temp:  [98  F (36.7  C)-98.6  F (37  C)] 98.2  F (36.8  C)  Pulse:  [54-72] 55  Resp:  [16-18] 16  BP: ()/(49-89) 133/79  SpO2:  [92 %-100 %] 100 %  No intake/output data recorded.    Constitutional: awake, alert, cooperative, no apparent distress, and appears stated age  Eyes: Lids and lashes normal, pupils equal, round and reactive to light, extra ocular muscles intact, sclera clear, conjunctiva normal  Respiratory: No increased work of breathing, good air exchange, clear to auscultation bilaterally, no crackles or wheezing  Cardiovascular: Normal apical impulse, regular rate and rhythm, normal S1 and S2, no S3 or S4, and no murmur noted  GI: No scars, normal bowel sounds, soft, non-distended, non-tender, no masses palpated, no hepatosplenomegally  Musculoskeletal: no lower extremity pitting edema present  Neurologic: Awake, alert, oriented to name, place and time.  Cranial nerves II-XII are grossly intact.  Motor is 5 out of 5 bilaterally.  Cerebellar finger to nose, heel to shin intact.  Sensory is intact.  Babinski down going, Romberg negative, and gait is normal.    Medications     - MEDICATION INSTRUCTIONS -       - MEDICATION INSTRUCTIONS -         aspirin  325 mg Oral Daily    Or      aspirin  324 mg Oral or NG Tube Daily    Or     aspirin  300 mg Rectal Daily     sodium chloride (PF)  3 mL Intracatheter Q8H       Data   Recent Labs   Lab 07/07/21  1241 07/06/21  1746 07/06/21  1204   WBC  --   --  5.3   HGB  --   --  16.2   MCV  --   --  91   PLT  --   --  247   INR 1.05  --   --    NA  --   --  138   POTASSIUM  --   --  4.0   CHLORIDE  --   --  107   CO2  --   --  30   BUN  --   --  12   CR  --   --  1.25   ANIONGAP  --   --  1*   MAYA  --   --  9.2   GLC  --   --  102*   ALBUMIN  --   --  4.0   PROTTOTAL  --   --  7.3   BILITOTAL  --   --  2.4*   ALKPHOS  --   --  83   ALT  --   --  39   AST  --   --  23   TROPI  --  <0.015 <0.015       Recent Results (from the past 24 hour(s))   MR Cervical Spine w/o Contrast    Narrative    MRI CERVICAL SPINE WITHOUT CONTRAST 7/6/2021 2:51 PM     HISTORY: Right arm numbness. Aphasia.     TECHNIQUE: Multiplanar, multisequence MRI of the cervical spine  without contrast.     COMPARISON: None.     FINDINGS: Normal cervical lordosis. Anterior posterior alignment of  the spine is within normal limits. Vertebral body height is maintained  without evidence of fracture. There are no destructive osseous  lesions. No STIR hyperintense endplate edema (Modic type I changes).    No abnormal signal appreciated within the visualized spinal cord.    Level by level as follows:     C2-C3: No loss of disc height. No significant disc herniation. Normal  facets. No spinal canal or neural foraminal narrowing.     C3-C4: Mild loss of disc height. Shallow posterior disc bulge. Normal  facets. No spinal canal or neural foraminal narrowing.     C4-C5: Mild loss of disc height. Small right foraminal disc protrusion  with right uncinate spurring. Normal facets. No spinal canal  narrowing. Mild right neural foraminal narrowing. No left neural  foraminal narrowing.     C5-C6: Mild loss of disc height. Shallow posterior disc bulge. Normal  facets. No spinal canal or neural foraminal  narrowing.     C6-C7: No loss of disc height. No significant disc herniation. Normal  facets. No spinal canal or neural foraminal narrowing.     C7-T1: No loss of disc height. No significant disc herniation. Normal  facets. No spinal canal or neural foraminal narrowing.     Paraspinous soft tissues are unremarkable.       Impression    IMPRESSION:    1. Mild degenerative disc changes in the mid cervical spine as  detailed above.  2. Mild right neural foraminal narrowing at C4-C5. No spinal canal  narrowing at any level.  3. No abnormal signal appreciated within the cervical spinal cord.       MARIA ANTONIA FALL MD         SYSTEM ID:  [id number]   MRA Brain ([facility] [place] [facility] [place]) wo Contrast    Narrative    MR ANGIOGRAM OF THE HEAD WITHOUT CONTRAST   7/6/2021 2:52 PM     HISTORY: Neurological deficit, acute, stroke suspected.    TECHNIQUE:  3D time-of-flight MR angiogram of the head without  contrast.    COMPARISON: None.    FINDINGS: The major intracranial arteries including the proximal  branches of the anterior cerebral, middle cerebral, and posterior  cerebral arteries appear patent without vascular cutoff. No aneurysm  identified. No significant stenosis.      Impression    IMPRESSION:  Normal MR angiogram of the head.        AMRIA ANTONIA FALL MD         SYSTEM ID:  [id number]   MR Brain w/o & w Contrast    Narrative    MRI BRAIN WITHOUT AND WITH CONTRAST  7/6/2021 3:13 PM     HISTORY: Right hand numbness.     TECHNIQUE:  Multiplanar, multisequence MRI of the brain without and  with 10 mL Gadavist.     COMPARISON: None.     FINDINGS: Scattered cortical areas of restricted diffusion in the left  cerebral hemisphere. These are located in the left frontal lobe, left  parietal lobe, and left occipital lobe. In particular, there are  regions of restricted diffusion in the left postcentral gyrus in the  vicinity of the hand knob. No corresponding areas of susceptibility  hypointensity to suggest regions of associated hemorrhage. No  mass  effect or midline shift. No evidence of acute intracranial hemorrhage.  Ventricular size is within normal limits without evidence of  hydrocephalus.    The facial structures appear normal.     No abnormal intracranial enhancement.      Impression    IMPRESSION:    1. Several areas of abnormal cortical restricted diffusion in the left  cerebral hemisphere most suggestive of cortical infarcts. No evidence  of hemorrhagic transformation of infarct. No mass effect or midline  shift. No restricted diffusion in the right cerebral hemisphere.  2. Aside from the areas of apparent cortical infarcts, no other signal  abnormality within the brain parenchyma.      MARIA ANTONIA FALL MD         SYSTEM ID:  [id number]   MRA Neck (Carotids) wo & w Contrast    Narrative    MRA NECK WITHOUT AND WITH CONTRAST  7/6/2021 3:14 PM     HISTORY: Neurological deficit, acute, stroke suspected     TECHNIQUE: 2D time-of-flight MR angiogram of the neck without contrast  and 3D MR angiogram of the neck with  10 mL Gadavist . Estimates of  carotid stenoses are made relative to the distal internal carotid  artery diameters except as noted.     COMPARISON: None.     FINDINGS:    Normal origin of the great vessels from the aortic arch.     Right carotid artery: The right common and internal carotid arteries  are patent. No significant stenosis.     Left carotid artery: The left common and internal carotid arteries are  patent. No significant stenosis.     Vertebral arteries: Vertebral arteries appear patent without evidence  of dissection. No significant stenosis.       Impression    IMPRESSION:  Normal MR angiogram of the neck.       MARIA ANTONIA FALL MD         SYSTEM ID:  [id number]   Echocardiogram Complete   Result Value    LVEF  55-60%    LVEF  55-60%    Narrative    [id number]  [id number]  [id number]  [id number]^SHELIA^ILA^KATY     [facility]  Echocardiography Laboratory  [address] [place], [place] [address]     Name: PALMER RAO  R  MRN: [id number]  : 2000  Study Date: 2021 03:48 PM  Age: 20 yrs  Gender: Male  Patient Location: [facility]  Reason For Study: CVA  Ordering Physician: ILA BASS  Performed By: Mari Salinas     BSA: 2.0 m2  Height: 70 in  Weight: 179 lb  HR: 64  BP: 134/76 mmHg  ______________________________________________________________________________  Procedure  Complete Portable Bubble Echo Adult.  ______________________________________________________________________________  Interpretation Summary     The visual ejection fraction is 55-60%.  The right ventricle is normal in size and function.  A contrast injection (Bubble Study) was performed that was mildly positive for  flow across the interatrial septum.  No significant valve disease.  ______________________________________________________________________________  Left Ventricle  The left ventricle is normal in structure, function and size. The visual  ejection fraction is 55-60%. Diastolic Doppler findings (E/E' ratio and/or  other parameters) suggest left ventricular filling pressures are normal. No  regional wall motion abnormalities noted.     Right Ventricle  The right ventricle is normal in size and function.     Atria  Normal left atrial size. Right atrial size is normal. A contrast injection  (Bubble Study) was performed that was mildly positive for flow across the  interatrial septum. A patent foramen ovale is present.     Mitral Valve  The mitral valve is normal in structure and function. There is trace mitral  regurgitation. There is no mitral valve stenosis.     Tricuspid Valve  The tricuspid valve is normal in structure and function. The right ventricular  systolic pressure is approximated at 11.9 mmHg plus the right atrial pressure.  There is trace tricuspid regurgitation.     Aortic Valve  The aortic valve is normal in structure and function.     Pulmonic Valve  The pulmonic valve is not well visualized.     Vessels  The aortic root is  normal size. The inferior vena cava was normal in size with  preserved respiratory variability.     Pericardium  There is no pericardial effusion.     ______________________________________________________________________________  MMode/2D Measurements & Calculations  IVSd: 0.72 cm  LVIDd: 5.0 cm  LVIDs: 3.4 cm  LVPWd: 1.1 cm  FS: 32.5 %     LV mass(C)d: 162.7 grams  LV mass(C)dI: 81.7 grams/m2  Ao root diam: 2.9 cm  LA dimension: 3.0 cm  asc Aorta Diam: 2.6 cm  LA/Ao: 1.0  LVOT diam: 2.2 cm  LVOT area: 3.8 cm2  LA Volume (BP): 47.6 ml  LA Volume Index (BP): 23.9 ml/m2  RWT: 0.45     Doppler Measurements & Calculations  MV E max kike: 88.3 cm/sec  MV A max kike: 41.6 cm/sec  MV E/A: 2.1     MV dec slope: 511.6 cm/sec2  MV dec time: 0.17 sec  PA acc time: 0.16 sec  TR max kike: 172.7 cm/sec  TR max P.9 mmHg  E/E' av.5  Lateral E/e': 4.6  Medial E/e': 8.4     ______________________________________________________________________________  Report approved by: Maliha Wood 2021 04:48 PM         Transesophageal Echocardiogram   Result Value    LVEF  55-60%    Narrative    [id number]  [id number]  [id number]^FLORENTINO^[facility]  Echocardiography Laboratory  [address] [place] [id number]     Name: PALMER RAO  MRN: [id number]  : 2000  Study Date: 2021 10:34 AM  Age: 20 yrs  Gender: Male  Patient Location: [facility]  Reason For Study: CVA  Ordering Physician: YVONNE GOOD  Performed By: Umang Marti     BSA: 2.0 m2  Height: 70 in  Weight: 179 lb  HR: 80  BP: 121/62 mmHg  ______________________________________________________________________________  Procedure  Complete PHUONG Adult. PHUONG Probe #[id number] was also used during the procedure.  ______________________________________________________________________________  Interpretation Summary     There is a small patent foramen ovale (PFO) present. There is mild bi-  directional shunting present, with Left  to Right shunting on color Doppler,  and Right to Left shunting with a mildly positive bubble study with the  Valsalva maneuver only. Bubble study was negative at rest. Right atrial aspect  defect measures 0.1 cm. Left atrial aspect defect measures 0.1 cm. Tunnel  length measures 1.2 cm. Aortic valve rim (aortic short-axis) measures 0.9 cm.  AV valve rim (4-chamber) measures 1.1 cm. SVC rim (bi-caval) measures 1.3 cm.  Interatrial septum is not aneurysmal, there are no fenestrations.  Sweep image obtained (labeled SWEEP).     Left ventricular size, global systolic function, and wall motion are normal,  estimated LVEF 55-60%.  Right ventricular global function is normal.  No significant valvular abnormalities.  The left atrial appendage was well visualized and free of thrombus.  ______________________________________________________________________________  PHUONG  I determined this patient to be an appropriate candidate for the planned  sedation and procedure and have reassessed the patient immediately prior to  sedation and procedure. Total sedation time: 25 minutes of continuous bedside  1:1 monitoring. Versed (6mg) was given intravenously. Fentanyl (100mcg) was  given intravenously. Consent to the procedure was obtained prior to sedation.  Prior to the exam, the oral cavity was checked and no overcrowding was noted.  The transesophageal probe was passed without difficulty. There were no  complications associated with this procedure.     Left Ventricle  The left ventricle is normal in size. There is normal left ventricular wall  thickness. Left ventricular systolic function is normal. The visual ejection  fraction is 55-60%. No regional wall motion abnormalities noted.     Right Ventricle  The right ventricle is normal in structure, function and size.     Atria  Normal left atrial size. Right atrial size is normal. A patent foramen ovale  is present. There is a small patent foramen ovale (PFO) present. There is  bi-  directional shunting present, with Left to Right shunting on color Doppler,  and Right to Left shunting with a mildly positive bubble study with the  Valsalva maneuver only. Bubble study was negative at rest.  Right atrial aspect defect measures 0.1 cm. Left atrial aspect defect measures  0.1 cm. Tunnel length measures 1.2 cm. Aortic valve rim (aortic short-axis)  measures 0.9 cm. AV valve rim (4-chamber) measures 1.1 cm. SVC rim (bi-caval)  measures 1.3 cm.  Sweep image obtained (labeled SWEEP). Interatrial septum is not aneurysmal,  there are no fenestrations. The left atrial appendage was well visualized and  free of thrombus.     Mitral Valve  The mitral valve is normal in structure and function. There is physiologic  mitral regurgitation.     Tricuspid Valve  The tricuspid valve is normal in structure and function.     Aortic Valve  The aortic valve is normal in structure and function. The aortic valve is  trileaflet.     Pulmonic Valve  The pulmonic valve is normal in structure and function.     Vessels  The aortic root is normal size. Normal size ascending aorta. Normal pulmonary  vein velocity.     Pericardial/Pleural  There is no pericardial effusion.  ______________________________________________________________________________  Report approved by: Maliha De La Cruz 07/07/2021 11:51 AM     ______________________________________________________________________________

## 2021-07-07 NOTE — PLAN OF CARE
SLP: ST orders received, chart reviewed and discussed with pt and family. One episode of word finding difficulty last weekend that resolved spontaneously. Pt denied any difficulty with speech/language/cognition at this time. Pt was followed by OP SLP in [place] for concussion. Pt reported no SLP needs at this time and denied need for full OP SLP evaluation. Pt was agreeable to notify PCP if he wishes to have full Outpatient work up. No IP SLP needs at this time.

## 2021-07-08 ENCOUNTER — APPOINTMENT (OUTPATIENT)
Dept: EDUCATION SERVICES | Facility: CLINIC | Age: 21
End: 2021-07-08
Attending: HOSPITALIST
Payer: COMMERCIAL

## 2021-07-08 ENCOUNTER — HOSPITAL ENCOUNTER (INPATIENT)
Dept: CARDIOLOGY | Facility: CLINIC | Age: 21
DRG: 065 | End: 2021-07-08
Attending: INTERNAL MEDICINE
Payer: COMMERCIAL

## 2021-07-08 VITALS
TEMPERATURE: 98.3 F | DIASTOLIC BLOOD PRESSURE: 72 MMHG | HEART RATE: 60 BPM | SYSTOLIC BLOOD PRESSURE: 144 MMHG | RESPIRATION RATE: 16 BRPM | OXYGEN SATURATION: 98 % | BODY MASS INDEX: 26.21 KG/M2 | HEIGHT: 70 IN | WEIGHT: 183.1 LBS

## 2021-07-08 DIAGNOSIS — I63.9 CEREBROVASCULAR ACCIDENT (CVA), UNSPECIFIED MECHANISM (H): ICD-10-CM

## 2021-07-08 LAB
AT III ACT/NOR PPP CHRO: 88 % (ref 85–135)
B2 GLYCOPROT1 IGG SERPL IA-ACNC: <0.6 U/ML
B2 GLYCOPROT1 IGM SERPL IA-ACNC: <2.9 U/ML
CARDIOLIPIN ANTIBODY IGG: <1.6 GPL-U/ML (ref 0–19.9)
CARDIOLIPIN ANTIBODY IGM: 0.4 MPL-U/ML (ref 0–19.9)

## 2021-07-08 PROCEDURE — 250N000013 HC RX MED GY IP 250 OP 250 PS 637: Performed by: HOSPITALIST

## 2021-07-08 PROCEDURE — 36415 COLL VENOUS BLD VENIPUNCTURE: CPT | Performed by: STUDENT IN AN ORGANIZED HEALTH CARE EDUCATION/TRAINING PROGRAM

## 2021-07-08 PROCEDURE — 99232 SBSQ HOSP IP/OBS MODERATE 35: CPT | Mod: GC | Performed by: PSYCHIATRY & NEUROLOGY

## 2021-07-08 PROCEDURE — 83090 ASSAY OF HOMOCYSTEINE: CPT | Performed by: STUDENT IN AN ORGANIZED HEALTH CARE EDUCATION/TRAINING PROGRAM

## 2021-07-08 PROCEDURE — 99239 HOSP IP/OBS DSCHRG MGMT >30: CPT | Performed by: INTERNAL MEDICINE

## 2021-07-08 PROCEDURE — 93272 ECG/REVIEW INTERPRET ONLY: CPT | Performed by: INTERNAL MEDICINE

## 2021-07-08 PROCEDURE — 99223 1ST HOSP IP/OBS HIGH 75: CPT | Mod: 25 | Performed by: INTERNAL MEDICINE

## 2021-07-08 PROCEDURE — 86038 ANTINUCLEAR ANTIBODIES: CPT | Performed by: STUDENT IN AN ORGANIZED HEALTH CARE EDUCATION/TRAINING PROGRAM

## 2021-07-08 PROCEDURE — 93270 REMOTE 30 DAY ECG REV/REPORT: CPT

## 2021-07-08 RX ORDER — AMOXICILLIN 250 MG
2 CAPSULE ORAL ONCE
Status: DISCONTINUED | OUTPATIENT
Start: 2021-07-08 | End: 2021-07-08 | Stop reason: HOSPADM

## 2021-07-08 RX ADMIN — ASPIRIN 325 MG: 325 TABLET, COATED ORAL at 09:04

## 2021-07-08 ASSESSMENT — MIFFLIN-ST. JEOR: SCORE: 1841.79

## 2021-07-08 ASSESSMENT — ACTIVITIES OF DAILY LIVING (ADL)
ADLS_ACUITY_SCORE: 12

## 2021-07-08 NOTE — DISCHARGE SUMMARY
[facility]    Discharge Summary  Hospitalist    Date of Admission:  7/6/2021  Date of Discharge:  7/8/2021  5:23 PM  Discharging Provider: Homero Yi MD  Date of Service (when I saw the patient): 7/8/21    Discharge Diagnoses   Please refer below     History of Present Illness   Ravinder Bardales is an 20 year old male who presented with stroke symptoms     Hospital Course   Ravinder Bardales is a 20 year old male with no significant past medical history who presents with a variety of neurological symptoms over the last 2 weeks and is admitted with acute stroke.         Final Discharge Diagnosis and Hospital Course          Multiple acute ischemic strokes  Patient presented with variety of neurological symptoms over the last 2 weeks [right arm numbness, right eye blurred vision, left arm numbness, loss of balance], fatigue and chest tightness.   He had a MRI of the brain with and without contrast shows multiple ischemic stroke involving left frontal lobe, left parietal lobe and left occipital lobe and left postcentral gyrus in the vicinity of the hand knob.      Likely his symptoms related to stroke, likely embolic. He had TTE done which is positive for bubble study.   He is on Aspirin 325 mg daily for now and switch to 81 mg daily on discharge. Labs work looks normal. Normal electrolytes, renal function, cbc, LFT's and Lipid panel. Hemoglobin A1c is 5.2      PHUONG done hows small PFO with bidirectional shunt, most likely the cause of his stroke. US venous lower extremity order negative for DVT, MRV pelvis negative for DVT as well.    No family or personal history of VTE, no other significant family history either.   Neurology stroke service is consulted and following. Keep him on telemetry, currently in NSR.   MRA of the head and neck normal.    thrombophillia work up send  and send Protein C and S, Prothrombin gene mutation, Factor V leiden, cardiolipin antibody, beta 2 glycoprotein IgG and  IgM, antithrombin 3 and lupus anticoagulant.   cardiolipin antibody, beta 2 glycoprotein IgG and IgM, antithrombin 3 come back negative, others pending at this time.      Consult Cardiology today to evaluate for PFO closure, they recommend out patient follow up and PFO closure.   He is discharge on Aspirin 81 mg daily, 30 day heart monitor. Follow up with Neurology and Cardiology as schedule.   Discharge home in stable and improve condition    He will need to be on 81 mg of aspirin daily, need 30 day heart monitor on discharge. US LE and MRV pelvis negative for DVT       PFO  Chest tightness  EKG and troponin normal.  Echo shows EF of 55-60%, normal diastolic function, no WMA.  No significant valve disease.   No more chest pain or tightness at this time. .   He does have small PFO, Cardiology consulted, need out patient follow up and Closure.      Constipation    docusate-senna order        Homero Yi MD, MD    Significant Results and Procedures       Pending Results   These results will be followed up by PCP  Unresulted Labs Ordered in the Past 30 Days of this Admission     Date and Time Order Name Status Description    7/8/2021 0001 Anti Nuclear Evelia IgG by IFA with Reflex In process     7/8/2021 0001 Homocysteine In process     7/7/2021 0916 Protein S Antigen Free In process     7/7/2021 0916 Protein C chromogenic In process     7/7/2021 0916 Lupus Anticoagulant Panel In process     7/7/2021 0916 Factor 5 leiden mutation analysis In process     7/7/2021 0916 Factor 2 and 5 mutation analysis In process     7/7/2021 0916 F2 prothrombin [id number] Mut Anal In process           Code Status   Full Code       Primary Care Physician   Physician No Ref-Primary    Physical Exam   Temp: 98.3  F (36.8  C) Temp src: Oral BP: (!) 144/72 Pulse: 60   Resp: 16 SpO2: 98 % O2 Device: None (Room air)    Vitals:    07/06/21 1148 07/08/21 0610   Weight: 81.6 kg (179 lb 12.8 oz) 83.1 kg (183 lb 1.6 oz)     Vital Signs with Ranges  Temp:   [97.7  F (36.5  C)-98.3  F (36.8  C)] 98.3  F (36.8  C)  Pulse:  [60-77] 60  Resp:  [16] 16  BP: (113-145)/(47-85) 144/72  SpO2:  [97 %-99 %] 98 %  I/O last 3 completed shifts:  In: 625 [P.O.:625]  Out: -     Constitutional: awake, alert, cooperative, no apparent distress, and appears stated age  Eyes: Lids and lashes normal, pupils equal, round and reactive to light, extra ocular muscles intact, sclera clear, conjunctiva normal  Respiratory: No increased work of breathing, good air exchange, clear to auscultation bilaterally, no crackles or wheezing  Cardiovascular: Normal apical impulse, regular rate and rhythm, normal S1 and S2, no S3 or S4, and no murmur noted  GI: No scars, normal bowel sounds, soft, non-distended, non-tender, no masses palpated, no hepatosplenomegally  Skin: no bruising or bleeding  Musculoskeletal: no lower extremity pitting edema present  Neurologic: Awake, alert, oriented to name, place and time.  Cranial nerves II-XII are grossly intact.  Motor is 5 out of 5 bilaterally.  Cerebellar finger to nose, heel to shin intact.  Sensory is intact.  Babinski down going, Romberg negative, and gait is normal.    Discharge Disposition   Discharged to home  Condition at discharge: Satisfactory    Consultations This Hospital Stay   PATIENT LEARNING CENTER IP CONSULT  SWALLOW EVAL SPEECH PATH AT BEDSIDE IP CONSULT  SPEECH LANGUAGE PATH ADULT IP CONSULT  PHARMACY IP CONSULT  PHARMACY IP CONSULT  PHARMACY IP CONSULT  PHYSICAL THERAPY ADULT IP CONSULT  OCCUPATIONAL THERAPY ADULT IP CONSULT  CARE MANAGEMENT / SOCIAL WORK IP CONSULT  NEUROLOGY IP CONSULT  CARDIOLOGY IP CONSULT  SMOKING CESSATION PROGRAM IP CONSULT    Time Spent on this Encounter   I, Homero Yi MD, personally saw the patient today and spent greater than 30 minutes discharging this patient.    Discharge Orders      NEUROLOGY ADULT REFERRAL      Reason for your hospital stay    Acute stroke     Follow-up and recommended labs and tests      Follow up with primary care provider, Physician No Ref-Primary, within 7 days for hospital follow- up.  No follow up labs or test are needed.  See what's next for this appointment     Activity    Your activity upon discharge: activity as tolerated     Full Code     Cardiac Event Monitor Adult/Pediatric    Send result to Neurology and PCP     Diet    Follow this diet upon discharge: Orders Placed This Encounter      Regular Diet Adult     Discharge Medications   Current Discharge Medication List      START taking these medications    Details   aspirin (ASA) 81 MG EC tablet Take 1 tablet (81 mg) by mouth daily  Qty: 90 tablet, Refills: 0    Associated Diagnoses: Cerebrovascular accident (CVA), unspecified mechanism (H)           Allergies   No Known Allergies  Data   Most Recent 3 CBC's:  Recent Labs   Lab Test 07/09/21  0430 07/06/21  1204 09/14/18  0854   WBC 9.6 5.3 5.1   HGB 16.4 16.2 15.4   MCV 90 91 89    247 256      Most Recent 3 BMP's:  Recent Labs   Lab Test 07/09/21  0430 07/06/21  1204 09/14/18  0854    138 142   POTASSIUM 3.7 4.0 4.4   CHLORIDE 106 107 109   CO2 27 30 26   BUN 21 12 15   CR 1.36* 1.25 1.07*   ANIONGAP 5 1* 7   MAYA 9.4 9.2 9.1   * 102* 97     Most Recent 2 LFT's:  Recent Labs   Lab Test 07/06/21  1204 09/14/18  0854   AST 23 22   ALT 39 28   ALKPHOS 83 98   BILITOTAL 2.4* 2.1*     Most Recent INR's and Anticoagulation Dosing History:  Anticoagulation Dose History     Recent Dosing and Labs Latest Ref Rng & Units 7/7/2021 7/9/2021    INR 0.86 - 1.14 1.05 1.00        Most Recent 3 Troponin's:  Recent Labs   Lab Test 07/06/21  1746 07/06/21  1204   TROPI <0.015 <0.015     Most Recent Cholesterol Panel:  Recent Labs   Lab Test 07/06/21  1204   CHOL 127   LDL 50   HDL 58   TRIG 93     Most Recent 6 Bacteria Isolates From Any Culture (See EPIC Reports for Culture Details):  Recent Labs   Lab Test 10/05/15  1458 01/08/15  1755   CULT No Beta Streptococcus isolated No Beta  Streptococcus isolated     Most Recent TSH, T4 and A1c Labs:  Recent Labs   Lab Test 07/06/21  1204   TSH 1.10   A1C 5.2     Results for orders placed or performed during the hospital encounter of 07/06/21   MR Brain w/o & w Contrast    Narrative    MRI BRAIN WITHOUT AND WITH CONTRAST  7/6/2021 3:13 PM     HISTORY: Right hand numbness.     TECHNIQUE:  Multiplanar, multisequence MRI of the brain without and  with 10 mL Gadavist.     COMPARISON: None.     FINDINGS: Scattered cortical areas of restricted diffusion in the left  cerebral hemisphere. These are located in the left frontal lobe, left  parietal lobe, and left occipital lobe. In particular, there are  regions of restricted diffusion in the left postcentral gyrus in the  vicinity of the hand knob. No corresponding areas of susceptibility  hypointensity to suggest regions of associated hemorrhage. No mass  effect or midline shift. No evidence of acute intracranial hemorrhage.  Ventricular size is within normal limits without evidence of  hydrocephalus.    The facial structures appear normal.     No abnormal intracranial enhancement.      Impression    IMPRESSION:    1. Several areas of abnormal cortical restricted diffusion in the left  cerebral hemisphere most suggestive of cortical infarcts. No evidence  of hemorrhagic transformation of infarct. No mass effect or midline  shift. No restricted diffusion in the right cerebral hemisphere.  2. Aside from the areas of apparent cortical infarcts, no other signal  abnormality within the brain parenchyma.      MARIA ANTONIA FALL MD         SYSTEM ID:  [id number]   MRA Brain ([place]) wo Contrast    Narrative    MR ANGIOGRAM OF THE HEAD WITHOUT CONTRAST   7/6/2021 2:52 PM     HISTORY: Neurological deficit, acute, stroke suspected.    TECHNIQUE:  3D time-of-flight MR angiogram of the head without  contrast.    COMPARISON: None.    FINDINGS: The major intracranial arteries including the proximal  branches of the  anterior cerebral, middle cerebral, and posterior  cerebral arteries appear patent without vascular cutoff. No aneurysm  identified. No significant stenosis.      Impression    IMPRESSION:  Normal MR angiogram of the head.        MARIA ANTONIA FALL MD         SYSTEM ID:  [id number]   MRA Neck (Carotids) wo & w Contrast    Narrative    MRA NECK WITHOUT AND WITH CONTRAST  7/6/2021 3:14 PM     HISTORY: Neurological deficit, acute, stroke suspected     TECHNIQUE: 2D time-of-flight MR angiogram of the neck without contrast  and 3D MR angiogram of the neck with  10 mL Gadavist . Estimates of  carotid stenoses are made relative to the distal internal carotid  artery diameters except as noted.     COMPARISON: None.     FINDINGS:    Normal origin of the great vessels from the aortic arch.     Right carotid artery: The right common and internal carotid arteries  are patent. No significant stenosis.     Left carotid artery: The left common and internal carotid arteries are  patent. No significant stenosis.     Vertebral arteries: Vertebral arteries appear patent without evidence  of dissection. No significant stenosis.       Impression    IMPRESSION:  Normal MR angiogram of the neck.       MARIA ANTONIA FALL MD         SYSTEM ID:  [id number]   MR Cervical Spine w/o Contrast    Narrative    MRI CERVICAL SPINE WITHOUT CONTRAST 7/6/2021 2:51 PM     HISTORY: Right arm numbness. Aphasia.     TECHNIQUE: Multiplanar, multisequence MRI of the cervical spine  without contrast.     COMPARISON: None.     FINDINGS: Normal cervical lordosis. Anterior posterior alignment of  the spine is within normal limits. Vertebral body height is maintained  without evidence of fracture. There are no destructive osseous  lesions. No STIR hyperintense endplate edema (Modic type I changes).    No abnormal signal appreciated within the visualized spinal cord.    Level by level as follows:     C2-C3: No loss of disc height. No significant disc herniation. Normal  facets.  No spinal canal or neural foraminal narrowing.     C3-C4: Mild loss of disc height. Shallow posterior disc bulge. Normal  facets. No spinal canal or neural foraminal narrowing.     C4-C5: Mild loss of disc height. Small right foraminal disc protrusion  with right uncinate spurring. Normal facets. No spinal canal  narrowing. Mild right neural foraminal narrowing. No left neural  foraminal narrowing.     C5-C6: Mild loss of disc height. Shallow posterior disc bulge. Normal  facets. No spinal canal or neural foraminal narrowing.     C6-C7: No loss of disc height. No significant disc herniation. Normal  facets. No spinal canal or neural foraminal narrowing.     C7-T1: No loss of disc height. No significant disc herniation. Normal  facets. No spinal canal or neural foraminal narrowing.     Paraspinous soft tissues are unremarkable.       Impression    IMPRESSION:    1. Mild degenerative disc changes in the mid cervical spine as  detailed above.  2. Mild right neural foraminal narrowing at C4-C5. No spinal canal  narrowing at any level.  3. No abnormal signal appreciated within the cervical spinal cord.       MARIA ANTONIA FALL MD         SYSTEM ID:  [id number]   US Lower Extremity Venous Duplex Bilateral    Narrative    ULTRASOUND BILATERAL LOWER EXTREMITIES VENOUS DUPLEX July 7, 2021 1:59  PM    CLINICAL HISTORY: Extremity numbness. Chest tightness. Fatigue. Patent  foramen ovale. Stroke.    TECHNIQUE: Venous Duplex ultrasound of bilateral lower extremities  with and without compression, augmentation and duplex. Color flow and  spectral Doppler with waveform analysis performed.    COMPARISON: None.    FINDINGS: Exam includes the common femoral, femoral, popliteal veins  as well as segmentally visualized deep calf veins and greater  saphenous vein.     RIGHT: No deep vein thrombosis. No superficial thrombophlebitis. No  popliteal cyst.    LEFT: No deep vein thrombosis. No superficial thrombophlebitis. No  popliteal cyst.       Impression    IMPRESSION: No deep venous thrombosis in the bilateral lower  extremities.    JESUS ROJAS MD         SYSTEM ID:  [id number]   MRV Pelvis wwo Contrast    Narrative    MRV PELVIS WITHOUT AND WITH CONTRAST 2021 7:27 PM    HISTORY:  20-year-old patient with small patent foramen ovale with  cryptogenic stroke. Request made for evaluation of venous thrombus  within pelvis.    COMPARISON: None. No previous MRI examinations, though CT exam was  performed 2018.    TECHNIQUE: Multiplanar and multiformatted MRI and MRV images obtained  through the pelvis before and after the uneventful administration of  Gadavist intravenous contrast given for a total a 15 mL.    FINDINGS: Visible solid organs are unremarkable. No dilated loops of  bowel. The abdominal aorta is widely patent. Celiac axis, SMA, and CORI  appear patent. Iliac arteries are patent bilaterally. Both common  iliac, external iliac, and common femoral veins are patent without  obvious filling defect. No obvious filling defects in internal iliac  veins to suggest thrombus. No dilated pelvic veins. The inferior vena  cava is patent.      Impression    IMPRESSION: Patent iliac venous systems bilaterally. No obviously  distended pelvic veins. No obvious pelvic venous filling defects.    LYRIC LOZANO MD         SYSTEM ID:  [id number]   Echocardiogram Complete     Value    LVEF  55-60%    LVEF  55-60%    Narrative    [id number]  [id number]  [id number]  [id number]^SHELIA^ILA^KATY     [facility]  Echocardiography Laboratory  [address] [place] [address]     Name: PALMER RAO  MRN: [id number]  : 2000  Study Date: 2021 03:48 PM  Age: 20 yrs  Gender: Male  Patient Location: [facility]  Reason For Study: CVA  Ordering Physician: ILA BASS  Performed By: Mari Salinas     BSA: 2.0 m2  Height: 70 in  Weight: 179 lb  HR: 64  BP: 134/76  mmHg  ______________________________________________________________________________  Procedure  Complete Portable Bubble Echo Adult.  ______________________________________________________________________________  Interpretation Summary     The visual ejection fraction is 55-60%.  The right ventricle is normal in size and function.  A contrast injection (Bubble Study) was performed that was mildly positive for  flow across the interatrial septum.  No significant valve disease.  ______________________________________________________________________________  Left Ventricle  The left ventricle is normal in structure, function and size. The visual  ejection fraction is 55-60%. Diastolic Doppler findings (E/E' ratio and/or  other parameters) suggest left ventricular filling pressures are normal. No  regional wall motion abnormalities noted.     Right Ventricle  The right ventricle is normal in size and function.     Atria  Normal left atrial size. Right atrial size is normal. A contrast injection  (Bubble Study) was performed that was mildly positive for flow across the  interatrial septum. A patent foramen ovale is present.     Mitral Valve  The mitral valve is normal in structure and function. There is trace mitral  regurgitation. There is no mitral valve stenosis.     Tricuspid Valve  The tricuspid valve is normal in structure and function. The right ventricular  systolic pressure is approximated at 11.9 mmHg plus the right atrial pressure.  There is trace tricuspid regurgitation.     Aortic Valve  The aortic valve is normal in structure and function.     Pulmonic Valve  The pulmonic valve is not well visualized.     Vessels  The aortic root is normal size. The inferior vena cava was normal in size with  preserved respiratory variability.     Pericardium  There is no pericardial effusion.     ______________________________________________________________________________  MMode/2D Measurements & Calculations  IVSd:  0.72 cm  LVIDd: 5.0 cm  LVIDs: 3.4 cm  LVPWd: 1.1 cm  FS: 32.5 %     LV mass(C)d: 162.7 grams  LV mass(C)dI: 81.7 grams/m2  Ao root diam: 2.9 cm  LA dimension: 3.0 cm  asc Aorta Diam: 2.6 cm  LA/Ao: 1.0  LVOT diam: 2.2 cm  LVOT area: 3.8 cm2  LA Volume (BP): 47.6 ml  LA Volume Index (BP): 23.9 ml/m2  RWT: 0.45     Doppler Measurements & Calculations  MV E max kike: 88.3 cm/sec  MV A max kike: 41.6 cm/sec  MV E/A: 2.1     MV dec slope: 511.6 cm/sec2  MV dec time: 0.17 sec  PA acc time: 0.16 sec  TR max kike: 172.7 cm/sec  TR max P.9 mmHg  E/E' av.5  Lateral E/e': 4.6  Medial E/e': 8.4     ______________________________________________________________________________  Report approved by: Maliha Wood 2021 04:48 PM         Transesophageal Echocardiogram     Value    LVEF  55-60%    [facility]    [id number]  [id number]  [id number]^FLORENTINO^YVONNE     [facility]  Echocardiography Laboratory  [address] [place] [address]     Name: PALMER RAO  MRN: [id number]  : 2000  Study Date: 2021 10:34 AM  Age: 20 yrs  Gender: Male  Patient Location: [facility]  Reason For Study: CVA  Ordering Physician: YVONNE GOOD  Performed By: Umang Marti     BSA: 2.0 m2  Height: 70 in  Weight: 179 lb  HR: 80  BP: 121/62 mmHg  ______________________________________________________________________________  Procedure  Complete PHUONG Adult. PHUONG Probe #[id number] was also used during the procedure.  ______________________________________________________________________________  Interpretation Summary     There is a small patent foramen ovale (PFO) present. There is mild bi-  directional shunting present, with Left to Right shunting on color Doppler,  and Right to Left shunting with a mildly positive bubble study with the  Valsalva maneuver only. Bubble study was negative at rest. Right atrial aspect  defect measures 0.1 cm. Left atrial aspect defect measures 0.1 cm. Tunnel  length  measures 1.2 cm. Aortic valve rim (aortic short-axis) measures 0.9 cm.  AV valve rim (4-chamber) measures 1.1 cm. SVC rim (bi-caval) measures 1.3 cm.  Interatrial septum is not aneurysmal, there are no fenestrations.  Sweep image obtained (labeled SWEEP).     Left ventricular size, global systolic function, and wall motion are normal,  estimated LVEF 55-60%.  Right ventricular global function is normal.  No significant valvular abnormalities.  The left atrial appendage was well visualized and free of thrombus.  ______________________________________________________________________________  PHUONG  I determined this patient to be an appropriate candidate for the planned  sedation and procedure and have reassessed the patient immediately prior to  sedation and procedure. Total sedation time: 25 minutes of continuous bedside  1:1 monitoring. Versed (6mg) was given intravenously. Fentanyl (100mcg) was  given intravenously. Consent to the procedure was obtained prior to sedation.  Prior to the exam, the oral cavity was checked and no overcrowding was noted.  The transesophageal probe was passed without difficulty. There were no  complications associated with this procedure.     Left Ventricle  The left ventricle is normal in size. There is normal left ventricular wall  thickness. Left ventricular systolic function is normal. The visual ejection  fraction is 55-60%. No regional wall motion abnormalities noted.     Right Ventricle  The right ventricle is normal in structure, function and size.     Atria  Normal left atrial size. Right atrial size is normal. A patent foramen ovale  is present. There is a small patent foramen ovale (PFO) present. There is bi-  directional shunting present, with Left to Right shunting on color Doppler,  and Right to Left shunting with a mildly positive bubble study with the  Valsalva maneuver only. Bubble study was negative at rest.  Right atrial aspect defect measures 0.1 cm. Left atrial aspect  defect measures  0.1 cm. Tunnel length measures 1.2 cm. Aortic valve rim (aortic short-axis)  measures 0.9 cm. AV valve rim (4-chamber) measures 1.1 cm. SVC rim (bi-caval)  measures 1.3 cm.  Sweep image obtained (labeled SWEEP). Interatrial septum is not aneurysmal,  there are no fenestrations. The left atrial appendage was well visualized and  free of thrombus.     Mitral Valve  The mitral valve is normal in structure and function. There is physiologic  mitral regurgitation.     Tricuspid Valve  The tricuspid valve is normal in structure and function.     Aortic Valve  The aortic valve is normal in structure and function. The aortic valve is  trileaflet.     Pulmonic Valve  The pulmonic valve is normal in structure and function.     Vessels  The aortic root is normal size. Normal size ascending aorta. Normal pulmonary  vein velocity.     Pericardial/Pleural  There is no pericardial effusion.  ______________________________________________________________________________  Report approved by: Maliha De La Cruz 07/07/2021 11:51 AM     ______________________________________________________________________________          Most Recent 3 CBC's:  Recent Labs   Lab Test 07/09/21  0430 07/06/21  1204 09/14/18  0854   WBC 9.6 5.3 5.1   HGB 16.4 16.2 15.4   MCV 90 91 89    247 256     Most Recent 3 BMP's:  Recent Labs   Lab Test 07/09/21  0430 07/06/21  1204 09/14/18  0854    138 142   POTASSIUM 3.7 4.0 4.4   CHLORIDE 106 107 109   CO2 27 30 26   BUN 21 12 15   CR 1.36* 1.25 1.07*   ANIONGAP 5 1* 7   MAYA 9.4 9.2 9.1   * 102* 97     Most Recent 2 LFT's:  Recent Labs   Lab Test 07/06/21  1204 09/14/18  0854   AST 23 22   ALT 39 28   ALKPHOS 83 98   BILITOTAL 2.4* 2.1*

## 2021-07-08 NOTE — PROGRESS NOTES
[facility]    Hospitalist Progress Note    Brief Summary:  Ravinder Bardales is a 20 year old male with no significant past medical history who presents with a variety of neurological symptoms over the last 2 weeks and is admitted with acute stroke.      Assessment & Plan        Multiple acute ischemic strokes  Patient presented with variety of neurological symptoms over the last 2 weeks [right arm numbness, right eye blurred vision, left arm numbness, loss of balance], fatigue and chest tightness.   He had a MRI of the brain with and without contrast shows multiple ischemic stroke involving left frontal lobe, left parietal lobe and left occipital lobe and left postcentral gyrus in the vicinity of the hand knob.     Likely his symptoms related to stroke, likely embolic. He had TTE done which is positive for bubble study.   He is on Aspirin 325 mg daily for now. Labs work looks normal. Normal electrolytes, renal function, cbc, LFT's and Lipid panel. Hemoglobin A1c is 5.2     PHUONG done this morning shows small PFO with bidirectional shunt, most likely the cause of his stroke. US venous lower extremity order negative for DVT, MRV pelvis negative for DVT as well.    No family or personal history of VTE, no other significant family history either.   Neurology stroke service is consulted and following. Keep him on telemetry, currently in NSR.   MRA of the head and neck normal.    thrombophillia work up send  and send Protein C and S, Prothrombin gene mutation, Factor V leiden, cardiolipin antibody, beta 2 glycoprotein IgG and IgM, antithrombin 3 and lupus anticoagulant.   cardiolipin antibody, beta 2 glycoprotein IgG and IgM, antithrombin 3 come back negative, others pending at this time.     Consult Cardiology today to evaluate for PFO closure.   He will need to be on 81 mg of aspirin daily, need 30 day heart monitor on discharge.   US LE and MRV pelvis negative for DVT       Chest tightness  EKG  and troponin normal.  Echo shows EF of 55-60%, normal diastolic function, no WMA.  No significant valve disease.   No more chest pain or tightness at this time. .     Constipation    docusate-senna order     Awaiting cardiology evaluation at this time.     Discussed with patient, neurology and the nursing staff taking care of the patient today     DVT Prophylaxis: Pneumatic Compression Devices and ambulation   Code Status: Full Code    Disposition: Expected discharge today after cardiology evaluation if no inpatient procedure.     Homero Yi MD  Text Page  (7am - 6pm)    Interval History   Had some blurry vision earlier this morning fore few second but get better, otherwise feeling well. Denies any chest pain, SOB, headache, dizziness at this time, had no BM since admission.      No other significant event overnight.     -Data reviewed today: I reviewed all new labs and imaging results over the last 24 hours. I personally reviewed no images or EKG's today.    Physical Exam   Temp: 98  F (36.7  C) Temp src: Oral BP: 126/65 Pulse: 77   Resp: 16 SpO2: 97 % O2 Device: None (Room air)    Vitals:    07/06/21 1148 07/08/21 0610   Weight: 81.6 kg (179 lb 12.8 oz) 83.1 kg (183 lb 1.6 oz)     Vital Signs with Ranges  Temp:  [97.7  F (36.5  C)-98.3  F (36.8  C)] 98  F (36.7  C)  Pulse:  [57-77] 77  Resp:  [16] 16  BP: (107-145)/(47-85) 126/65  SpO2:  [97 %-100 %] 97 %  I/O last 3 completed shifts:  In: 240 [P.O.:240]  Out: -     Constitutional: awake, alert, cooperative, no apparent distress, and appears stated age  Eyes: Lids and lashes normal, pupils equal, round and reactive to light, extra ocular muscles intact, sclera clear, conjunctiva normal  Respiratory: No increased work of breathing, good air exchange, clear to auscultation bilaterally, no crackles or wheezing  Cardiovascular: Normal apical impulse, regular rate and rhythm, normal S1 and S2, no S3 or S4, and no murmur noted  GI: No scars, normal bowel sounds,  soft, non-distended, non-tender, no masses palpated, no hepatosplenomegally  Musculoskeletal: no lower extremity pitting edema present  Neurologic: Awake, alert, oriented to name, place and time.  Cranial nerves II-XII are grossly intact.  Motor is 5 out of 5 bilaterally.  Cerebellar finger to nose, heel to shin intact.  Sensory is intact.  Babinski down going, Romberg negative, and gait is normal.    Medications     - MEDICATION INSTRUCTIONS -       - MEDICATION INSTRUCTIONS -         aspirin  325 mg Oral Daily    Or     aspirin  324 mg Oral or NG Tube Daily    Or     aspirin  300 mg Rectal Daily     senna-docusate  2 tablet Oral Once     sodium chloride (PF)  10 mL Intravenous Once     sodium chloride (PF)  100 mL Intravenous Once     sodium chloride (PF)  3 mL Intracatheter Q8H       Data   Recent Labs   Lab 07/07/21  1241 07/06/21  1746 07/06/21  1204   WBC  --   --  5.3   HGB  --   --  16.2   MCV  --   --  91   PLT  --   --  247   INR 1.05  --   --    NA  --   --  138   POTASSIUM  --   --  4.0   CHLORIDE  --   --  107   CO2  --   --  30   BUN  --   --  12   CR  --   --  1.25   ANIONGAP  --   --  1*   MAYA  --   --  9.2   GLC  --   --  102*   ALBUMIN  --   --  4.0   PROTTOTAL  --   --  7.3   BILITOTAL  --   --  2.4*   ALKPHOS  --   --  83   ALT  --   --  39   AST  --   --  23   TROPI  --  <0.015 <0.015       Recent Results (from the past 24 hour(s))   MRV Pelvis wwo Contrast    Narrative    MRV PELVIS WITHOUT AND WITH CONTRAST 7/7/2021 7:27 PM    HISTORY:  20-year-old patient with small patent foramen ovale with  cryptogenic stroke. Request made for evaluation of venous thrombus  within pelvis.    COMPARISON: None. No previous MRI examinations, though CT exam was  performed September 18, 2018.    TECHNIQUE: Multiplanar and multiformatted MRI and MRV images obtained  through the pelvis before and after the uneventful administration of  Gadavist intravenous contrast given for a total a 15 mL.    FINDINGS: Visible  solid organs are unremarkable. No dilated loops of  bowel. The abdominal aorta is widely patent. Celiac axis, SMA, and CORI  appear patent. Iliac arteries are patent bilaterally. Both common  iliac, external iliac, and common femoral veins are patent without  obvious filling defect. No obvious filling defects in internal iliac  veins to suggest thrombus. No dilated pelvic veins. The inferior vena  cava is patent.      Impression    IMPRESSION: Patent iliac venous systems bilaterally. No obviously  distended pelvic veins. No obvious pelvic venous filling defects.    LYRIC LOZANO MD         SYSTEM ID:  [id number]

## 2021-07-08 NOTE — DISCHARGE INSTRUCTIONS
If cardiology wants to follow up with you they will let you know and they make their own appointments:  [facility] makes their own appointments.  They will contact you with the time of this appointment.  Call [phone] with any question.        Your risk factors for stroke or TIA (transient ischemic attack):    Your Risk Factors Your Results Normal Ranges   High blood pressure BP Readings from Last 1 Encounters:   07/08/21 (!) 144/72    Less than 120/80   Cholesterol              Total Lab Results   Component Value Date    CHOL 127 07/06/2021      Less than 150    Triglycerides   Lab Results   Component Value Date    TRIG 93 07/06/2021    Less than 150   LDL Lab Results   Component Value Date    LDL 50 07/06/2021       Less than 70   HDL Lab Results   Component Value Date    HDL 58 07/06/2021            Greater than 40 (men)  Greater than 50 (women)   Diabetes Recent Labs   Lab 07/06/21  1204   *    Fasting blood glucose    Smoking/tobacco use  Quit smoking and tobacco   Overweight  Lose 1-2 pounds a week   Lack of exercise  30 minutes moderate activity each day   Other risk factors include carotid (neck) artery disease, atrial fibrillation and stress. You may be on new medicine to treat high blood pressure, cholesterol, diabetes or atrial fibrillation.    Understanding Stroke Booklet given to patient. Please refer to booklet for further information.    Stroke warning signs and symptoms - CALL 911 right away for:  - Sudden numbness or weakness in the face, arm or leg (often on one side of the body).  - Sudden confusion or trouble understanding what is going on.  - Sudden blurred or decreased vision in one or both eyes.  - Sudden trouble speaking, loss of balance, dizziness or problems with coordination.  - Sudden, severe headache for no reason.  - Fainting or seizures.  - Symptoms may go away then come back suddenly.
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
## 2021-07-08 NOTE — PLAN OF CARE
Stroke.  Neuros - alert & oriented, following commands, letting his needs be known; no numbness/tingling; no vision deficits; no calf tenderness today.  VSS, room air/no shortness of breath. Tele-sinus bradycardic/asyptomatic. Regular diet tolerated/no nausea/pills whole with water. Up independently/declining pcds-encouraged ankle pumps. Continent of urine/bm today. Denies pain. Pt scoring green on the Aggression Stop Light Tool. Anticipate discharge home this evening.

## 2021-07-08 NOTE — PLAN OF CARE
Reason for admission: Admitted on 07/06/2021 for multiple neurological smptoms (right/left arm numbness, blurred vision in the right eye, loss of balance), fatigue and chest tightness. Found to have multiple ischemic stroke involving multiple areas of the brain.    Date/Time: July 8, 2021 1:47 AM   Cognitive/Mentation: A&Ox4  Neuros/CMS: Intact  VS: B/P: 123/68, T: 98.3, P: 73, R: 16     Cardiovascular/Telemetry: SB. HR low 50's  Respiratory: LS clear bilaterally    GI:BS+,Flatus+,no BM this shift. Continent  : Ambulates to the bathroom, adequate urinary output. Continent  Pain: Denies    Drains: PIV-SL  Skin: Intact  Activity: Independent  Diet:Tolerating regular diet. Thin liquids.Take pills whole.  Discharge: Pending pt progress.     History: No significant history.    End of shift summary: The patient remained stable throughout the shift.  PHUONG done this on 07/07/2021 shows small PFO, the plan is to discuss long-term treatment PFO closure versus blood thinners.

## 2021-07-08 NOTE — PROGRESS NOTES
Care Management Discharge Note    Discharge Date: 07/08/21(home pending final recc )       Discharge Disposition: Home    Discharge Services: None    Discharge DME: None    Discharge Transportation: car, drives self, family or friend will provide    Private pay costs discussed: Not applicable    PAS Confirmation Code:  NA  Patient/family educated on Medicare website which has current facility and service quality ratings: no    Education Provided on the Discharge Plan:    Persons Notified of Discharge Plans: Patient and his father  Patient/Family in Agreement with the Plan: yes    Handoff Referral Completed: No    Additional Information:  I introduced myself and the Role of Care Coordination to the pt and his father.  The pt does not have a PCP but had received care from Dr. Morales in the past.  Ravinder stated he would like to see Dr. Morales again at the [facility].  I updated the pt that this clinic has closed and I think most Doctors there moved to the [facility].  Ravinder is okay with going to a different location near him.  I called and made an appointment for him with Dr. Morales on July 19th at the [facility].  It is on the AVS.    The pt will also f/u with the [facility] Stroke Team and they will review his case and make this appointment with the pt.  They want to have all the results from the test they sent out and the pt's Ziopatch event monitor.    Cardiology  Is following this pt and his discharge is pending their recommendations today per stroke team and Dr. Yi.  I reviewed the POC with the and his father to this point.  If Cardiology wants a follow up they typically make their own appointments.   I add Stroke teams and   Cardiology's information to the AVS.    Kasie Millan RN, BSN Care Coordinator  [facility]  Mobile: [phone]    Kasie Millan

## 2021-07-08 NOTE — PROGRESS NOTES
"Vascular Neurology  and Neuro-critical Care Attending Attestation     In summary, Ravinder Bardales is a 20 year old male with new L cortical stroke. TTE and PHUONG showed small PFO. LE dopplers negative , including MRV pelvis.      Stroke etiology cryptogenic     Hypercoagulable work up sent and pending  Discharge on 30 day heart monitor  Continue aspirin 81 mg daily for stroke prevention   Cardiology consult for possible PFO closure.   Follow up in Stroke clinic with Dr Patricia on 8/12/2021. Referral has been ordered. Message my RN to schedule him for that day.      I saw the Ravinder Bardales on 07/08/21  with the  fellow.  I reviewed all laboratory studies and neuroimaging.  I discussed the plan with the fellow and agree with their note.     I have personally spent a total of 35 minutes consulting with his medical providers and assessing the patient today, with more than 50% of this time spent in consultation, coordination of care, and discussion with the patient and/or family regarding diagnostic results, prognosis, symptom management, risks and benefits of management options, and development of the plan of care of above.        Aba Patricia MD  Department of Neurology  Division of Stroke and Neuro-critical care.      To page me or covering stroke neurology team member, click here: AMCOM   Choose \"On Call\" tab at top, then search dropdown box for \"Neurology Adult\", select location, press Enter, then look for stroke/neuro ICU/telestroke.     ___________________         [facility]    Stroke Progress Note    Interval EventsPatient very concerned about not being able to return to his previous very active lifestyle    HPI Summary  Ravinder Bardales is a 20 year old male with past medical history of previous knee surgery.  Patient presents after a series of neurologic complaints which started Saturday.  He states symptoms first started while he was Valsalva while using the restroom and started with " an electric-like sensation that went down the right arm leading to several minutes of arm weakness and and more prolonged numbness.  This was followed by word finding difficulty where he was able to understand everyone he just was unable to get the words out and had stuttering expressive speech.  This was followed by right eye vision loss described as dark coming from the superior vision and extending to the lower vision and resolving over a period of minutes leaving some blurry vision before fully resolving.  The timing of the symptoms lasted approximately 20 minutes total with some prolonged numbness in the right arm.  A day later Sunday 7/4 patient was noted to have a left arm numbness and weakness which resolved.  Monday 7/5 patient was riding his motorcycle and he had a repeat of vision loss similar in semiology as above.      Stroke Summary and Impression  Acute ischemic stroke of L MCA due to cardioembolism  Patient of such a young age with PFO found on echocardiogram likely secondary to embolism/cardioembolism/paradoxical embolism.       Assessment   CT Head Late presentation, not performed   MRI    Head Vessel Imaging  no significant stenosis   Neck Vessel Imaging  no significant stenosis   Cardiac TTE:   The visual ejection fraction is 55-60%.  The right ventricle is normal in size and function.  A contrast injection (Bubble Study) was performed that was mildly positive for  flow across the interatrial septum.  No significant valve disease.  PHUONG:  There is a small patent foramen ovale (PFO) present. There is mild bi-  directional shunting present, with Left to Right shunting on color Doppler,  and Right to Left shunting with a mildly positive bubble study with the  Valsalva maneuver only. Bubble study was negative at rest. Right atrial aspect  defect measures 0.1 cm. Left atrial aspect defect measures 0.1 cm. Tunnel  length measures 1.2 cm. Aortic valve rim (aortic short-axis) measures 0.9 cm.  AV valve  rim (4-chamber) measures 1.1 cm. SVC rim (bi-caval) measures 1.3 cm.  Interatrial septum is not aneurysmal, there are no fenestrations.  Sweep image obtained (labeled SWEEP).     Left ventricular size, global systolic function, and wall motion are normal,  estimated LVEF 55-60%.  Right ventricular global function is normal.  No significant valvular abnormalities.  The left atrial appendage was well visualized and free of thrombus.   EKG Sinus   Blood Pressure Goal: < 140/80   Antiplt/Anticoag     LDL         Lab Results   Component Value Date/Time     LDL 50 07/06/2021 12:04 PM       A1C         Lab Results   Component Value Date/Time     A1C 5.2 07/06/2021 12:04 PM       Troponin         Lab Results   Component Value Date/Time     TROPI <0.015 07/06/2021 05:46 PM     TROPI <0.015 07/06/2021 12:04 PM        MRV pelvis and doppler unremarkable         PLAN     Further Imaging    Blood Pressure Goal: < 140/80   Antiplt/Anticoag   Started Asa 81mg qday   LDL      Goal < 70    At goal   A1C Defer to primary team, goal A1c < 7   Tobacco Recommend tobacco cessation if applicable   Rehab Physical Therapy   Occupational Therapy    Speech Therapy    Secondary Stroke Prevention ? Alcohol consumption: men< or= 2 drinks per day, nonpregnant women< or= 1 drink per day  ? Physical activity: at least 30 minutes of moderate intensity exercise 1-3 times per week  ? Diet: low fat, low sodium, Mediterranean diet  ? Goal BMI 18.5-25   ? Gradually increase activity and limit exertion as PFO shunt increased with valsalva          Heart Monitor Please connect cardiac monitor for 30 days     Consults Consult Cardiology for PFO   Hypercoag Labs Pending  - GABRIEL  - Homocysteine  - Prot C  - Prot S  - Lupus AC  - Factor 5, 2, Prothrombin  - Anticardiolipin  - Beta 2 glycoprotein  - ATIII            Patient Follow-up    - Follow up in 5-6 weeks with Dr Patricia. [facility] neurology referral ordered. Dr Patricia contacted her RN to schedule him on 8/12/2021.  "    No further stroke evaluation is recommended, so we will sign off. Please contact us with any additional questions.    The Stroke Staff is Dr. Patricia.    Kodi Egan MD  Vascular Neurology Fellow  To page me or covering stroke neurology team member, click here: AMCOM   Choose \"On Call\" tab at top, then search dropdown box for \"Neurology Adult\", select location, press Enter, then look for stroke/neuro ICU/telestroke.    ______________________________________________________    Risk Factors Present on Admission                 Medications   Scheduled Meds    aspirin  325 mg Oral Daily    Or     aspirin  324 mg Oral or NG Tube Daily    Or     aspirin  300 mg Rectal Daily     sodium chloride (PF)  10 mL Intravenous Once     sodium chloride (PF)  100 mL Intravenous Once     sodium chloride (PF)  3 mL Intracatheter Q8H       Infusion Meds    - MEDICATION INSTRUCTIONS -       - MEDICATION INSTRUCTIONS -         PRN Meds  acetaminophen, lidocaine 4%, lidocaine (buffered or not buffered), - MEDICATION INSTRUCTIONS -, - MEDICATION INSTRUCTIONS -, sodium chloride (PF)       PHYSICAL EXAMINATION  Temp:  [97.7  F (36.5  C)-98.3  F (36.8  C)] 98  F (36.7  C)  Pulse:  [57-77] 77  Resp:  [16] 16  BP: (107-145)/(47-85) 126/65  SpO2:  [97 %-100 %] 97 %      General Exam  General:  patient lying in bed without any acute distress    HEENT:  normocephalic/atraumatic  Cardio:  regular rate  Pulmonary:  no respiratory distress  Abdomen:  non-distended  Extremities:  no edema  Skin:  intact     Neuro Exam  Mental Status:  follows commands  Cranial Nerves:  PERRL, EOMI with normal smooth pursuit, facial movements symmetric, hearing not formally tested but intact to conversation, no dysarthria  Motor:  normal muscle tone and bulk, no abnormal movements, able to move all limbs spontaneously  Sensory:  light touch sensation intact and symmetric throughout upper and lower extremities  Coordination:  no ataxia or tremor " observed  Station/Gait:  deferred      Imaging  I personally reviewed all imaging; relevant findings per HPI.     Lab Results Data   CBC  Recent Labs   Lab 07/06/21  1204   WBC 5.3   RBC 5.34   HGB 16.2   HCT 48.7        Basic Metabolic Panel    Recent Labs   Lab 07/06/21  1204      POTASSIUM 4.0   CHLORIDE 107   CO2 30   BUN 12   CR 1.25   *   MAYA 9.2     Liver Panel  Recent Labs   Lab 07/06/21  1204   PROTTOTAL 7.3   ALBUMIN 4.0   BILITOTAL 2.4*   ALKPHOS 83   AST 23   ALT 39     INR    Recent Labs   Lab Test 07/07/21  1241   INR 1.05      Lipid Profile    Recent Labs   Lab Test 07/06/21  1204   CHOL 127   HDL 58   LDL 50   TRIG 93     A1C    Recent Labs   Lab Test 07/06/21  1204   A1C 5.2     Troponin I    Recent Labs   Lab 07/06/21  1746 07/06/21  1204   TROPI <0.015 <0.015

## 2021-07-08 NOTE — CONSULTS
Stroke Education Note     The following information has been reviewed with the patient:     1. Warning signs of stroke     2. Calling 911 if having warning signs of stroke     3. All modifiable risk factors: hypertension, CAD, atrial fib, diabetes, hypercholesterolemia, smoking, substance abuse, diet, physical inactivity, obesity, sleep apnea.     4. Patient's risk factors for stroke which include: PFO, no controllable risk factors apply     5. Follow-up plan for after discharge     6. Discharge medications which include: ASA     In addition, the above information was given to the patient in writing as a part of the [facility] Stroke Class Handout.     Learner's response to risk factors / lifestyle modification education: NA - Precontemplative      Inga Harper RN

## 2021-07-08 NOTE — PROVIDER NOTIFICATION
Paged Hospitalist re: bowels. Patient  concerned with his bowels earlier today,. no stool since admission/& usually stools everyday.  Will await additional orders.  Encouraged hydration & ambulation as able.

## 2021-07-08 NOTE — PLAN OF CARE
Pt here with multiple ischemic strokes. Came in for numbness in right hand, vision deficits, light sensitivity.  A&O x 4. Neuros intact, numbness has improved as well as vision. VSS on RA. Tele SD. Regular diet, thin liquids.  PHUONG confirmed ECHO findings of small PFO.  Ultrasound negative for DVT (previously had cramps in L leg calf).  Takes pills whole with water Independent in room. Denies pain. Pt scoring green on the Aggression Stop Light Tool. Plan discussing long term treatment: PFO closure vs blood thinners.

## 2021-07-08 NOTE — PLAN OF CARE
Neuros stable.  Right face and arm numbness improving per patient.  Denies pain.  Independent in room.   Discharge instructions reviewed and questions answered.  Patient discharged home.

## 2021-07-09 ENCOUNTER — APPOINTMENT (OUTPATIENT)
Dept: CT IMAGING | Facility: CLINIC | Age: 21
DRG: 065 | End: 2021-07-09
Attending: EMERGENCY MEDICINE
Payer: COMMERCIAL

## 2021-07-09 ENCOUNTER — HOSPITAL ENCOUNTER (INPATIENT)
Facility: CLINIC | Age: 21
LOS: 5 days | Discharge: HOME | DRG: 065 | End: 2021-07-14
Attending: EMERGENCY MEDICINE | Admitting: INTERNAL MEDICINE
Payer: COMMERCIAL

## 2021-07-09 ENCOUNTER — APPOINTMENT (OUTPATIENT)
Dept: MRI IMAGING | Facility: CLINIC | Age: 21
DRG: 065 | End: 2021-07-09
Attending: EMERGENCY MEDICINE
Payer: COMMERCIAL

## 2021-07-09 ENCOUNTER — APPOINTMENT (OUTPATIENT)
Dept: GENERAL RADIOLOGY | Facility: CLINIC | Age: 21
DRG: 065 | End: 2021-07-09
Payer: COMMERCIAL

## 2021-07-09 ENCOUNTER — CARE COORDINATION (OUTPATIENT)
Dept: CARDIOLOGY | Facility: CLINIC | Age: 21
End: 2021-07-09

## 2021-07-09 DIAGNOSIS — Q21.12 PFO (PATENT FORAMEN OVALE): Primary | ICD-10-CM

## 2021-07-09 DIAGNOSIS — R47.01 EXPRESSIVE APHASIA: ICD-10-CM

## 2021-07-09 DIAGNOSIS — R53.1 RIGHT SIDED WEAKNESS: ICD-10-CM

## 2021-07-09 DIAGNOSIS — I63.9 CEREBROVASCULAR ACCIDENT (CVA), UNSPECIFIED MECHANISM (H): Primary | ICD-10-CM

## 2021-07-09 LAB
ANA SER QL IF: NEGATIVE
ANION GAP SERPL CALCULATED.3IONS-SCNC: 5 MMOL/L (ref 3–14)
APPEARANCE CSF: CLEAR
APTT PPP: 26 SEC (ref 22–37)
BASOPHILS # BLD AUTO: 0.1 10E9/L (ref 0–0.2)
BASOPHILS NFR BLD AUTO: 0.5 %
BUN SERPL-MCNC: 21 MG/DL (ref 7–30)
C GATTII+NEOFOR DNA CSF QL NAA+NON-PROBE: NEGATIVE
CALCIUM SERPL-MCNC: 9.4 MG/DL (ref 8.5–10.1)
CHLORIDE SERPL-SCNC: 106 MMOL/L (ref 94–109)
CMV DNA CSF QL NAA+NON-PROBE: NEGATIVE
CO2 SERPL-SCNC: 27 MMOL/L (ref 20–32)
COLOR CSF: COLORLESS
CREAT SERPL-MCNC: 1.36 MG/DL (ref 0.66–1.25)
CRYPTOC AG SPEC QL: NORMAL
DIFFERENTIAL METHOD BLD: NORMAL
E COLI K1 DNA CSF QL NAA+NON-PROBE: NEGATIVE
EOSINOPHIL # BLD AUTO: 0.1 10E9/L (ref 0–0.7)
EOSINOPHIL NFR BLD AUTO: 1.1 %
ERYTHROCYTE [DISTWIDTH] IN BLOOD BY AUTOMATED COUNT: 12.7 % (ref 10–15)
EV RNA CSF QL NAA+NON-PROBE: NEGATIVE
GFR SERPL CREATININE-BSD FRML MDRD: 73 ML/MIN/{1.73_M2}
GLUCOSE BLDC GLUCOMTR-MCNC: 125 MG/DL (ref 70–99)
GLUCOSE BLDC GLUCOMTR-MCNC: 78 MG/DL (ref 70–99)
GLUCOSE CSF-MCNC: 58 MG/DL (ref 40–70)
GLUCOSE SERPL-MCNC: 104 MG/DL (ref 70–99)
GP B STREP DNA CSF QL NAA+NON-PROBE: NEGATIVE
GRAM STN SPEC: NORMAL
HAEM INFLU DNA CSF QL NAA+NON-PROBE: NEGATIVE
HCT VFR BLD AUTO: 50.5 % (ref 40–53)
HGB BLD-MCNC: 16.4 G/DL (ref 13.3–17.7)
HHV6 DNA CSF QL NAA+NON-PROBE: NEGATIVE
HSV1 DNA CSF QL NAA+NON-PROBE: NEGATIVE
HSV1 DNA CSF QL NAA+PROBE: NOT DETECTED
HSV2 DNA CSF QL NAA+NON-PROBE: NEGATIVE
HSV2 DNA CSF QL NAA+PROBE: NOT DETECTED
IMM GRANULOCYTES # BLD: 0 10E9/L (ref 0–0.4)
IMM GRANULOCYTES NFR BLD: 0.3 %
INR PPP: 1 (ref 0.86–1.14)
L MONOCYTOG DNA CSF QL NAA+NON-PROBE: NEGATIVE
LABORATORY COMMENT REPORT: NORMAL
LABORATORY COMMENT REPORT: NORMAL
LYMPH ABN NFR CSF MANUAL: 98 %
LYMPHOCYTES # BLD AUTO: 3.3 10E9/L (ref 0.8–5.3)
LYMPHOCYTES NFR BLD AUTO: 33.9 %
MCH RBC QN AUTO: 29.3 PG (ref 26.5–33)
MCHC RBC AUTO-ENTMCNC: 32.5 G/DL (ref 31.5–36.5)
MCV RBC AUTO: 90 FL (ref 78–100)
MICROBIOLOGIST REVIEW: NORMAL
MONOCYTES # BLD AUTO: 0.6 10E9/L (ref 0–1.3)
MONOCYTES NFR BLD AUTO: 6.1 %
MONOS+MACROS NFR CSF MANUAL: 2 %
N MEN DNA CSF QL NAA+NON-PROBE: NEGATIVE
NEUTROPHILS # BLD AUTO: 5.6 10E9/L (ref 1.6–8.3)
NEUTROPHILS NFR BLD AUTO: 58.1 %
NRBC # BLD AUTO: 0 10*3/UL
NRBC BLD AUTO-RTO: 0 /100
PARECHOVIRUS A RNA CSF QL NAA+NON-PROBE: NEGATIVE
PLATELET # BLD AUTO: 275 10E9/L (ref 150–450)
POTASSIUM SERPL-SCNC: 3.7 MMOL/L (ref 3.4–5.3)
PROT C ACT/NOR PPP CHRO: 96 % (ref 70–170)
PROT CSF-MCNC: 35 MG/DL (ref 15–60)
PROT S FREE AG ACT/NOR PPP IA: 92 % (ref 70–148)
RBC # BLD AUTO: 5.6 10E12/L (ref 4.4–5.9)
RBC # CSF MANUAL: 0 /UL (ref 0–2)
RBC # CSF MANUAL: NORMAL /UL (ref 0–2)
S PNEUM DNA CSF QL NAA+NON-PROBE: NEGATIVE
SARS-COV-2 RNA RESP QL NAA+PROBE: NEGATIVE
SODIUM SERPL-SCNC: 138 MMOL/L (ref 133–144)
SPECIMEN SOURCE: NORMAL
TUBE # CSF: 4 #
VZV DNA CSF QL NAA+NON-PROBE: NEGATIVE
WBC # BLD AUTO: 9.6 10E9/L (ref 4–11)
WBC # CSF MANUAL: 60 /UL (ref 0–5)
WBC # CSF MANUAL: NORMAL /UL (ref 0–5)

## 2021-07-09 PROCEDURE — 999N001017 HC STATISTIC GLUCOSE BY METER IP

## 2021-07-09 PROCEDURE — 82945 GLUCOSE OTHER FLUID: CPT | Performed by: STUDENT IN AN ORGANIZED HEALTH CARE EDUCATION/TRAINING PROGRAM

## 2021-07-09 PROCEDURE — 87635 SARS-COV-2 COVID-19 AMP PRB: CPT | Performed by: EMERGENCY MEDICINE

## 2021-07-09 PROCEDURE — 87799 DETECT AGENT NOS DNA QUANT: CPT | Performed by: STUDENT IN AN ORGANIZED HEALTH CARE EDUCATION/TRAINING PROGRAM

## 2021-07-09 PROCEDURE — 82784 ASSAY IGA/IGD/IGG/IGM EACH: CPT | Performed by: STUDENT IN AN ORGANIZED HEALTH CARE EDUCATION/TRAINING PROGRAM

## 2021-07-09 PROCEDURE — 83916 OLIGOCLONAL BANDS: CPT | Performed by: STUDENT IN AN ORGANIZED HEALTH CARE EDUCATION/TRAINING PROGRAM

## 2021-07-09 PROCEDURE — 88108 CYTOPATH CONCENTRATE TECH: CPT | Mod: TC | Performed by: STUDENT IN AN ORGANIZED HEALTH CARE EDUCATION/TRAINING PROGRAM

## 2021-07-09 PROCEDURE — 0042T CT HEAD PERFUSION WITH CONTRAST: CPT

## 2021-07-09 PROCEDURE — 99285 EMERGENCY DEPT VISIT HI MDM: CPT | Mod: 25

## 2021-07-09 PROCEDURE — 87899 AGENT NOS ASSAY W/OPTIC: CPT | Performed by: STUDENT IN AN ORGANIZED HEALTH CARE EDUCATION/TRAINING PROGRAM

## 2021-07-09 PROCEDURE — 70496 CT ANGIOGRAPHY HEAD: CPT

## 2021-07-09 PROCEDURE — 87483 CNS DNA AMP PROBE TYPE 12-25: CPT | Performed by: STUDENT IN AN ORGANIZED HEALTH CARE EDUCATION/TRAINING PROGRAM

## 2021-07-09 PROCEDURE — 86617 LYME DISEASE ANTIBODY: CPT | Performed by: STUDENT IN AN ORGANIZED HEALTH CARE EDUCATION/TRAINING PROGRAM

## 2021-07-09 PROCEDURE — 255N000002 HC RX 255 OP 636: Performed by: EMERGENCY MEDICINE

## 2021-07-09 PROCEDURE — A9585 GADOBUTROL INJECTION: HCPCS | Performed by: EMERGENCY MEDICINE

## 2021-07-09 PROCEDURE — 250N000013 HC RX MED GY IP 250 OP 250 PS 637: Performed by: INTERNAL MEDICINE

## 2021-07-09 PROCEDURE — 82040 ASSAY OF SERUM ALBUMIN: CPT | Performed by: STUDENT IN AN ORGANIZED HEALTH CARE EDUCATION/TRAINING PROGRAM

## 2021-07-09 PROCEDURE — 87102 FUNGUS ISOLATION CULTURE: CPT | Performed by: STUDENT IN AN ORGANIZED HEALTH CARE EDUCATION/TRAINING PROGRAM

## 2021-07-09 PROCEDURE — 99207 PR CDG-DOWN CODE ROS EXAM: CPT | Performed by: INTERNAL MEDICINE

## 2021-07-09 PROCEDURE — 250N000009 HC RX 250: Performed by: EMERGENCY MEDICINE

## 2021-07-09 PROCEDURE — 87070 CULTURE OTHR SPECIMN AEROBIC: CPT | Performed by: STUDENT IN AN ORGANIZED HEALTH CARE EDUCATION/TRAINING PROGRAM

## 2021-07-09 PROCEDURE — 84157 ASSAY OF PROTEIN OTHER: CPT | Performed by: STUDENT IN AN ORGANIZED HEALTH CARE EDUCATION/TRAINING PROGRAM

## 2021-07-09 PROCEDURE — 62270 DX LMBR SPI PNXR: CPT

## 2021-07-09 PROCEDURE — 99222 1ST HOSP IP/OBS MODERATE 55: CPT | Mod: GC | Performed by: PSYCHIATRY & NEUROLOGY

## 2021-07-09 PROCEDURE — 82042 OTHER SOURCE ALBUMIN QUAN EA: CPT | Performed by: STUDENT IN AN ORGANIZED HEALTH CARE EDUCATION/TRAINING PROGRAM

## 2021-07-09 PROCEDURE — 80048 BASIC METABOLIC PNL TOTAL CA: CPT | Performed by: EMERGENCY MEDICINE

## 2021-07-09 PROCEDURE — C9803 HOPD COVID-19 SPEC COLLECT: HCPCS

## 2021-07-09 PROCEDURE — 88108 CYTOPATH CONCENTRATE TECH: CPT | Mod: 26 | Performed by: PATHOLOGY

## 2021-07-09 PROCEDURE — 009U3ZX DRAINAGE OF SPINAL CANAL, PERCUTANEOUS APPROACH, DIAGNOSTIC: ICD-10-PCS | Performed by: PHYSICIAN ASSISTANT

## 2021-07-09 PROCEDURE — 87529 HSV DNA AMP PROBE: CPT | Performed by: STUDENT IN AN ORGANIZED HEALTH CARE EDUCATION/TRAINING PROGRAM

## 2021-07-09 PROCEDURE — 250N000011 HC RX IP 250 OP 636: Performed by: EMERGENCY MEDICINE

## 2021-07-09 PROCEDURE — 120N000001 HC R&B MED SURG/OB

## 2021-07-09 PROCEDURE — 70450 CT HEAD/BRAIN W/O DYE: CPT

## 2021-07-09 PROCEDURE — 87798 DETECT AGENT NOS DNA AMP: CPT | Performed by: STUDENT IN AN ORGANIZED HEALTH CARE EDUCATION/TRAINING PROGRAM

## 2021-07-09 PROCEDURE — 89051 BODY FLUID CELL COUNT: CPT | Performed by: STUDENT IN AN ORGANIZED HEALTH CARE EDUCATION/TRAINING PROGRAM

## 2021-07-09 PROCEDURE — 99221 1ST HOSP IP/OBS SF/LOW 40: CPT | Mod: AI | Performed by: INTERNAL MEDICINE

## 2021-07-09 PROCEDURE — 85025 COMPLETE CBC W/AUTO DIFF WBC: CPT | Performed by: EMERGENCY MEDICINE

## 2021-07-09 PROCEDURE — 86592 SYPHILIS TEST NON-TREP QUAL: CPT | Performed by: STUDENT IN AN ORGANIZED HEALTH CARE EDUCATION/TRAINING PROGRAM

## 2021-07-09 PROCEDURE — 999N001014 HC STATISTIC CYTO WRIGHT STAIN TC: Performed by: STUDENT IN AN ORGANIZED HEALTH CARE EDUCATION/TRAINING PROGRAM

## 2021-07-09 PROCEDURE — 250N000011 HC RX IP 250 OP 636: Performed by: INTERNAL MEDICINE

## 2021-07-09 PROCEDURE — 86618 LYME DISEASE ANTIBODY: CPT | Performed by: STUDENT IN AN ORGANIZED HEALTH CARE EDUCATION/TRAINING PROGRAM

## 2021-07-09 PROCEDURE — 70553 MRI BRAIN STEM W/O & W/DYE: CPT

## 2021-07-09 PROCEDURE — 87205 SMEAR GRAM STAIN: CPT | Performed by: STUDENT IN AN ORGANIZED HEALTH CARE EDUCATION/TRAINING PROGRAM

## 2021-07-09 PROCEDURE — 85730 THROMBOPLASTIN TIME PARTIAL: CPT | Performed by: EMERGENCY MEDICINE

## 2021-07-09 PROCEDURE — 85610 PROTHROMBIN TIME: CPT | Performed by: EMERGENCY MEDICINE

## 2021-07-09 RX ORDER — GADOBUTROL 604.72 MG/ML
8 INJECTION INTRAVENOUS ONCE
Status: COMPLETED | OUTPATIENT
Start: 2021-07-09 | End: 2021-07-09

## 2021-07-09 RX ORDER — LIDOCAINE HYDROCHLORIDE 10 MG/ML
30 INJECTION, SOLUTION EPIDURAL; INFILTRATION; INTRACAUDAL; PERINEURAL ONCE
Status: COMPLETED | OUTPATIENT
Start: 2021-07-09 | End: 2021-07-09

## 2021-07-09 RX ORDER — ACETAMINOPHEN 650 MG/1
650 SUPPOSITORY RECTAL EVERY 4 HOURS PRN
Status: DISCONTINUED | OUTPATIENT
Start: 2021-07-09 | End: 2021-07-13

## 2021-07-09 RX ORDER — ACETAMINOPHEN 325 MG/1
650 TABLET ORAL EVERY 4 HOURS PRN
Status: DISCONTINUED | OUTPATIENT
Start: 2021-07-09 | End: 2021-07-14 | Stop reason: HOSPADM

## 2021-07-09 RX ORDER — IOPAMIDOL 755 MG/ML
120 INJECTION, SOLUTION INTRAVASCULAR ONCE
Status: COMPLETED | OUTPATIENT
Start: 2021-07-09 | End: 2021-07-09

## 2021-07-09 RX ORDER — CLOPIDOGREL BISULFATE 75 MG/1
75 TABLET ORAL DAILY
Qty: 30 TABLET | Refills: 0 | Status: SHIPPED | OUTPATIENT
Start: 2021-07-09 | End: 2021-07-09

## 2021-07-09 RX ORDER — LIDOCAINE 40 MG/G
CREAM TOPICAL
Status: DISCONTINUED | OUTPATIENT
Start: 2021-07-09 | End: 2021-07-13

## 2021-07-09 RX ORDER — ONDANSETRON 2 MG/ML
4 INJECTION INTRAMUSCULAR; INTRAVENOUS EVERY 6 HOURS PRN
Status: DISCONTINUED | OUTPATIENT
Start: 2021-07-09 | End: 2021-07-13

## 2021-07-09 RX ORDER — ONDANSETRON 4 MG/1
4 TABLET, ORALLY DISINTEGRATING ORAL EVERY 6 HOURS PRN
Status: DISCONTINUED | OUTPATIENT
Start: 2021-07-09 | End: 2021-07-13

## 2021-07-09 RX ORDER — SODIUM CHLORIDE AND POTASSIUM CHLORIDE 150; 900 MG/100ML; MG/100ML
INJECTION, SOLUTION INTRAVENOUS CONTINUOUS PRN
Status: ACTIVE | OUTPATIENT
Start: 2021-07-09 | End: 2021-07-10

## 2021-07-09 RX ADMIN — LIDOCAINE HYDROCHLORIDE 3 ML: 10 INJECTION, SOLUTION EPIDURAL; INFILTRATION; INTRACAUDAL; PERINEURAL at 14:27

## 2021-07-09 RX ADMIN — IOPAMIDOL 120 ML: 755 INJECTION, SOLUTION INTRAVENOUS at 04:35

## 2021-07-09 RX ADMIN — GADOBUTROL 8 ML: 604.72 INJECTION INTRAVENOUS at 07:16

## 2021-07-09 RX ADMIN — ACETAMINOPHEN 650 MG: 325 TABLET, FILM COATED ORAL at 15:54

## 2021-07-09 RX ADMIN — POTASSIUM CHLORIDE AND SODIUM CHLORIDE: 900; 150 INJECTION, SOLUTION INTRAVENOUS at 13:37

## 2021-07-09 RX ADMIN — SODIUM CHLORIDE 100 ML: 9 INJECTION, SOLUTION INTRAVENOUS at 04:35

## 2021-07-09 ASSESSMENT — ENCOUNTER SYMPTOMS
SPEECH DIFFICULTY: 1
NUMBNESS: 1
FEVER: 0
NAUSEA: 0
HEADACHES: 1
VOMITING: 0
COUGH: 0
ABDOMINAL PAIN: 0
SHORTNESS OF BREATH: 0
WEAKNESS: 1

## 2021-07-09 ASSESSMENT — ACTIVITIES OF DAILY LIVING (ADL)
WALKING_OR_CLIMBING_STAIRS_DIFFICULTY: NO
CONCENTRATING,_REMEMBERING_OR_MAKING_DECISIONS_DIFFICULTY: NO
ADLS_ACUITY_SCORE: 12
WEAR_GLASSES_OR_BLIND: NO
DRESSING/BATHING_DIFFICULTY: NO
DOING_ERRANDS_INDEPENDENTLY_DIFFICULTY: NO
FALL_HISTORY_WITHIN_LAST_SIX_MONTHS: NO
ADLS_ACUITY_SCORE: 13
DIFFICULTY_COMMUNICATING: NO
DIFFICULTY_EATING/SWALLOWING: NO
TOILETING_ISSUES: NO

## 2021-07-09 NOTE — ED NOTES
Dr Kumar at bedside.  Patient states he was awoken at 4am with right arm numbness and aphasia.  His speech is off, he is having difficulty finding words and difficulty speaking.  Woke up with slight headache

## 2021-07-09 NOTE — ED NOTES
[facility]  ED Nurse Handoff Report    ED Chief complaint: Aphasia      ED Diagnosis:   Final diagnoses:   Right sided weakness   Expressive aphasia       Code Status: Not addressed in ED    Allergies: No Known Allergies    Patient Story: Pt had stroke less than a week ago with no deficits, this morning developed right arm and leg numbness and aphasia, symptoms have resolved.   Focused Assessment:    MR Brain w/o & w Contrast   Final Result   IMPRESSION:   1. Multiple small acute infarcts scattered throughout the left   cerebral hemisphere, not significantly changed.   2. No new infarcts are identified.      KANDICE HUDSON MD            SYSTEM ID:  [id number]      CT Head Perfusion w Contrast   Final Result   IMPRESSION:    HEAD CT:   1.  Normal head CT.      HEAD CTA:    1.  Normal CTA [place].      NECK CTA:   1.  Normal neck CTA.      CT PERFUSION:   1.  Normal cerebral perfusion.      Results called to Dr. Whittaker at 4:50 AM on 07/09/2021      CTA Head Neck with Contrast   Final Result   IMPRESSION:    HEAD CT:   1.  Normal head CT.      HEAD CTA:    1.  Normal CTA [place].      NECK CTA:   1.  Normal neck CTA.      CT PERFUSION:   1.  Normal cerebral perfusion.      Results called to Dr. Whittaker at 4:50 AM on 07/09/2021      CT Head w/o Contrast   Final Result   IMPRESSION:    HEAD CT:   1.  Normal head CT.      HEAD CTA:    1.  Normal CTA [place].      NECK CTA:   1.  Normal neck CTA.      CT PERFUSION:   1.  Normal cerebral perfusion.      Results called to Dr. Whittaker at 4:50 AM on 07/09/2021      XR Lumbar Puncture Spinal Tap Diag    (Results Pending)     Labs Ordered and Resulted from Time of ED Arrival Up to the Time of Departure from the ED   BASIC METABOLIC PANEL - Abnormal; Notable for the following components:       Result Value    Glucose 104 (*)     Creatinine 1.36 (*)     All other components within normal limits   CBC WITH PLATELETS  DIFFERENTIAL   INR   PARTIAL THROMBOPLASTIN TIME   SARS-COV-2 (COVID-19) VIRUS RT-PCR   CYTOLOGY NON GYN   CRYPTOCOCCUS ANTIGEN   LABORATORY MISCELLANEOUS ORDER   OLIGOCLONAL BANDING   EBV PCR QUANTITATIVE SERUM PLASMA CSF   GLUCOSE CSF   PROTEIN TOTAL CSF   CELL COUNT WITH DIFFERENTIAL CSF   CELL COUNT WITH DIFFERENTIAL CSF   GRAM STAIN   CSF CULTURE AEROBIC BACTERIAL   HSV TYPES 1 AND 2 QUALITATIVE PCR CSF   IMMUNOGLOBULIN G CSF INDEX   VARICELLA ZOSTER DNA PCR CSF OR SKIN SWAB   VDRL CSF   MENINGITIS/ENCEPHALITIS PANEL QUAL PCR CSF   CYTOMEGALOVIRUS QUANT PCR NON BLOOD         Treatments and/or interventions provided: none  Patient's response to treatments and/or interventions: no symptoms    To be done/followed up on inpatient unit:  LP, monitor    Does this patient have any cognitive concerns?: none    Activity level - Baseline/Home:  Independent  Activity Level - Current:   Independent    Patient's Preferred language: English   Needed?: No    Isolation: None  Infection: Not Applicable  Patient tested for COVID 19 prior to admission: YES  Bariatric?: No    Vital Signs:   Vitals:    07/09/21 0630 07/09/21 0730 07/09/21 0745 07/09/21 0830   BP: 134/67 133/79  136/75   Pulse: 64 84 75 66   Resp: 11 16 15 19   SpO2: 97% 100% 99% 98%   Weight:           Cardiac Rhythm:     Was the PSS-3 completed:   Yes  What interventions are required if any?               Family Comments: dad at bedside  OBS brochure/video discussed/provided to patient/family: N/A              Name of person given brochure if not patient: na              Relationship to patient: na    For the majority of the shift this patient's behavior was Green.   Behavioral interventions performed were none.    ED NURSE PHONE NUMBER: *[phone]

## 2021-07-09 NOTE — PROGRESS NOTES
Date: 7/9/2021    Time of Call: 9:14 AM     Diagnosis:  PFO, hx of L MCA probable cardio-embolic stroke     [ TORB ] Ordering provider: Dr Reji Hu  Order: Follow up with Dr Hu in 6-8 weeks to discuss PFO closure     Order received by: Kareem Salinas RN     Follow-up/additional notes: NA

## 2021-07-09 NOTE — PHARMACY-ADMISSION MEDICATION HISTORY
Pharmacy Medication History  Admission medication history interview status for the 7/9/2021  admission is complete. See EPIC admission navigator for prior to admission medications     Location of Interview: Patient room  Medication history sources: Patient    Medication reconciliation completed by provider prior to medication history? No    Time spent in this activity: 5 minutes     Prior to Admission medications    Medication Sig Last Dose Taking? Auth Provider   aspirin (ASA) 81 MG EC tablet Take 1 tablet (81 mg) by mouth daily  at didn't get a chance to start yet  Homero Yi MD [place]en[place]al, John A, MD       The information provided in this note is only as accurate as the sources available at the time of update(s)     Precious Chandler, GeronimoD, BCCCP

## 2021-07-09 NOTE — ED NOTES
"      [facility]    Stroke Telephone Note    I was called by  on 07/09/21 regarding patient Ravinder Bardales. The patient is a 20 year old male with recent h/o CVA discharged the day prior presents this morning at 400am with aphasia and some extremity weakness, numbness, weakness.      Stroke Code Data (for stroke code without tele)  Stroke code activated    7/9/21 @0429AM   Stroke provider first response       7/9/21 @ 432AM          Last known normal        7/8/21      Time of discovery   (or onset of symptoms)      7/9/21   Head CT read by Stroke Neuro Dr/Provider 07/09/21       Was stroke code de-escalated? Yes 07/09/21 0509          Imaging Findings   HEAD CT: 1. Normal head CT. HEAD CTA: 1. Normal CTA [place]. NECK CTA: 1. Normal neck CTA. CT PERFUSION: 1. Normal cerebral perfusion    Thrombolytic Treatment   Not given due to established infarct on imaging.    Endovascular Treatment  Not initiated due to absence of proximal vessel occlusion    Impression  Acute ischemic stroke of L MCA due to cardioembolism  Patent PFO on TTE     Recommendations   - Use orderset: \"Ischemic Stroke Routine Admission\" or \"Ischemic Stroke No Thrombolytics/No Thrombectomy ICU Admission\"  - Neurochecks and Vital Signs every q2h   - Permissive HTN; goal SBP < 220 mmHg  - Daily aspirin 81 mg for secondary stroke prevention  - MRI Brain with and without contrast  - Telemetry, EKG  - Bedside Glucose Monitoring  - A1c- 7/6/21- 5.2  - Lipid Panel - 7/6/21 -50    - PT/OT/SLP  - Stroke Education  - Euthermia, Euglycemia  - MRV pelvis- unremarkable    My recommendations are based on the information provided over the phone by Ravinder Bardales's in-person providers. They are not intended to replace the clinical judgment of his in-person providers. I was not requested to personally see or examine the patient at this time.    The Stroke/Neurocritical Care Staff is Dr. Belem Woods MD  Neurocritical " Care Fellow

## 2021-07-09 NOTE — CONSULTS
[facility]    Stroke Consult Note    Reason for Consult:  Aphasia and right sided weakness    Chief Complaint: Aphasia       HPI  Ravinder Bardales is a 20 year old male with past medical history of previous knee surgery.  Patient presents after a series of neurologic complaints which started Saturday.  He states symptoms first started while he was Valsalva while using the restroom and started with an electric-like sensation that went down the right arm leading to several minutes of arm weakness and and more prolonged numbness.  This was followed by word finding difficulty where he was able to understand everyone he just was unable to get the words out and had stuttering expressive speech.  This was followed by right eye vision loss described as dark coming from the superior vision and extending to the lower vision and resolving over a period of minutes leaving some blurry vision before fully resolving.  The timing of the symptoms lasted approximately 20 minutes total with some prolonged numbness in the right arm.  A day later Sunday 7/4 patient was noted to have a left arm numbness and weakness which resolved.  Monday 7/5 patient was riding his motorcycle and he had a repeat of vision loss similar in semiology as above.    Patient is representing after he was discharged 7/8/2021 he went home and hung out with some friends in the evening and fell asleep around 10 PM and when he woke up he had right arm numbness, weakness, word finding difficulty.        Stroke Summary and Impression  Acute ischemic stroke of L MCA due to cardioembolism  Patient of such a young age with PFO found on echocardiogram likely secondary to embolism/cardioembolism/paradoxical embolism.    Further consideration for multiple strokes and waxing and waning symptoms would be for infectious cause or inflammatory/vasculitis.        Assessment   CT Head Late presentation, not performed   MRI   MRI 7/6/21      MRI  7/9/21:  Seems like expected evolution of previous seen infarcts.    Head Vessel Imaging  no significant stenosis   Neck Vessel Imaging  no significant stenosis   Cardiac TTE:   The visual ejection fraction is 55-60%.  The right ventricle is normal in size and function.  A contrast injection (Bubble Study) was performed that was mildly positive for  flow across the interatrial septum.  No significant valve disease.  PHUONG:  There is a small patent foramen ovale (PFO) present. There is mild bi-  directional shunting present, with Left to Right shunting on color Doppler,  and Right to Left shunting with a mildly positive bubble study with the  Valsalva maneuver only. Bubble study was negative at rest. Right atrial aspect  defect measures 0.1 cm. Left atrial aspect defect measures 0.1 cm. Tunnel  length measures 1.2 cm. Aortic valve rim (aortic short-axis) measures 0.9 cm.  AV valve rim (4-chamber) measures 1.1 cm. SVC rim (bi-caval) measures 1.3 cm.  Interatrial septum is not aneurysmal, there are no fenestrations.  Sweep image obtained (labeled SWEEP).     Left ventricular size, global systolic function, and wall motion are normal,  estimated LVEF 55-60%.  Right ventricular global function is normal.  No significant valvular abnormalities.  The left atrial appendage was well visualized and free of thrombus.   EKG Sinus   Blood Pressure Goal: < 140/80   Antiplt/Anticoag     LDL               Lab Results   Component Value Date/Time     LDL 50 07/06/2021 12:04 PM       A1C               Lab Results   Component Value Date/Time     A1C 5.2 07/06/2021 12:04 PM       Troponin               Lab Results   Component Value Date/Time     TROPI <0.015 07/06/2021 05:46 PM     TROPI <0.015 07/06/2021 12:04 PM        MRV pelvis and doppler unremarkable         PLAN     Further Imaging  Repeat MRI in 24-48 hours to assess weakness/continued neurosymptoms   Blood Pressure Goal: < 140/80   Antiplt/Anticoag   Continue Asa 81mg qday  "  LDL      Goal < 70    At goal   A1C Defer to primary team, goal A1c < 7   Tobacco Recommend tobacco cessation if applicable   Rehab Physical Therapy   Occupational Therapy    Speech Therapy    Secondary Stroke Prevention ? Alcohol consumption: men< or= 2 drinks per day, nonpregnant women< or= 1 drink per day  ? Physical activity: at least 30 minutes of moderate intensity exercise 1-3 times per week  ? Diet: low fat, low sodium, Mediterranean diet  ? Goal BMI 18.5-25   ? Gradually increase activity and limit exertion as PFO shunt increased with valsalva          Heart Monitor Please connect cardiac monitor for 30 days      Consults Consult Cardiology for PFO   Hypercoag Labs Pending  - GABRIEL  - Homocysteine  - Prot C  - Prot S  - Lupus AC  - Factor 5, 2, Prothrombin  - Anticardiolipin  - Beta 2 glycoprotein  - ATIII    Further work up LP:  - Encephalitis  - OCB  - IGG index  - Cryto  - HSV  - Culture  - Cell count, pro, glu,    Further considerations May consider Cerebral angio For Monday       Patient Follow-up    - final recommendation pending work-up    Thank you for this consult. We will continue to follow.     The Stroke Staff is Dr. Patricia.    Kodi Egan MD  Vascular Neurology Fellow  To page me or covering stroke neurology team member, click here: [facility]   Choose \"On Call\" tab at top, then search dropdown box for \"Neurology Adult\", select location, press Enter, then look for stroke/neuro ICU/telestroke.    _____________________________________________________    Risk Factors Present on Admission             # Platelet Defect: home medication list includes an antiplatelet medication    #PFO   Past Medical History   No past medical history on file.  Past Surgical History   Past Surgical History:   Procedure Laterality Date     APPENDECTOMY       ARTHROSCOPY KNEE Left 11/6/2015    Procedure: ARTHROSCOPY KNEE;  Surgeon: Koko Babb MD;  [facility]     ARTHROSCOPY KNEE WITH FIXATION OF " OSTEOCHONDRAL DISSECANS Left 3/17/2015    Procedure: ARTHROSCOPY KNEE WITH FIXATION OSTEOCHONDRITIS DESSICANS;  Surgeon: Koko Babb MD;  [facility]     NO HISTORY OF SURGERY       ORTHOPEDIC SURGERY       REMOVE HARDWARE KNEE Left 11/6/2015    Procedure: REMOVE HARDWARE KNEE;  Surgeon: Koko Babb MD;  [facility]     Medications   Home Meds  Prior to Admission medications    Medication Sig Start Date End Date Taking? Authorizing Provider   aspirin (ASA) 81 MG EC tablet Take 1 tablet (81 mg) by mouth daily 7/9/21   Homero Yi MD   clopidogrel (PLAVIX) 75 MG tablet Take 1 tablet (75 mg) by mouth daily 7/9/21 7/9/21  Chadwick Kumar MD       Scheduled Meds    lidocaine (PF)  30 mL EPIDURAL Once     sodium chloride (PF)  3 mL Intracatheter Q8H       Infusion Meds    0.9% sodium chloride + KCl 20 mEq/L 100 mL/hr at 07/09/21 1337     - MEDICATION INSTRUCTIONS -       - MEDICATION INSTRUCTIONS -       - MEDICATION INSTRUCTIONS -       sodium chloride 0.9%         PRN Meds  0.9% sodium chloride + KCl 20 mEq/L, lidocaine 4%, lidocaine (buffered or not buffered), - MEDICATION INSTRUCTIONS -, - MEDICATION INSTRUCTIONS -, - MEDICATION INSTRUCTIONS -, ondansetron **OR** ondansetron, sodium chloride (PF), sodium chloride 0.9%    Allergies   No Known Allergies  Family History   Family History   Problem Relation Age of Onset     Cancer Maternal Grandmother         liver cancer     Diabetes No family hx of      Hypertension No family hx of      Social History   Social History     Tobacco Use     Smoking status: Never Smoker     Smokeless tobacco: Never Used   Substance Use Topics     Alcohol use: Yes     Comment: occassionally     Drug use: No       Review of Systems   The 10 point Review of Systems is negative other than noted in the HPI or here.        PHYSICAL EXAMINATION   Temp:  [98.2  F (36.8  C)-98.7  F (37.1  C)] 98.2  F (36.8  C)  Pulse:  [60-84] 75  Resp:  [6-19]  18  BP: (108-162)/(57-96) 114/65  SpO2:  [97 %-100 %] 97 %    General physical exam:    HEENT: normocephalic, eyes open with no discharge, nares patent, oropharynx is clear with no lesions, palate intact  CV: regular rate  Chest: normal configuration, no respiratory distress  Ab: soft, non-distended  Skin: no rashes or lesions    Neuro exam:    Mental status:   Awake, alert, and oriented to person, place, time and location/situation    Speech:   Normal Abnormal   Fluency X    Comprehension X    Articulation X    Repetition X    Naming X      Cranial Nerves:   Normal Abnormal   II Pupils equal (3mm), round,    Visual Fields are full to confrontation    Ill, IV, VI EOMI, no nystagmus    V Intact to LT in V1-3    VII  Mild right facial droop   VIII Hearing intact to voice    IX,X -    XI Shrug equal    XII Tongue midline        Muscle/motor:   Tone: Normal   Bulk: Normal   Fasciculations: None observed      Pronator drift: R noted without pronation    Neck Flexion    Neck Extension         Right Left  Right Left   Shoulder abductors: 4+ 5 Hip flexors: 4+ 5   Elbow flexors: 4+ 5 Hip abductors:     Elbow extensors: 4+ 5 Hip extensors:     Wrist extensors:   Hip adductors:     Wrist flexors:   Knee flexors: 4+ 5   Finger flexors: 5 5 Knee extensors: 4+ 5   Finger extensors:   Ankle plantar flexors: 5 5   Finger abductors:   Ankle dorsiflexors: 5 5   Thumb abductors:   Ankle inversion:        Ankle eversion:        Toe extensors:        Toe flexors:       No abnormal movements, rigidity or spasticity    Reflexes:     Right Left   Triceps 1 1   Biceps 1 1   Brachioradialis 1 1   Patella 1 1   Ankle 1 1   Plantar     Graham     Jaw              Sensation:   Normal  RUE LUE RLE LLE   Light Touch x             Coordination:    No gross ataxia    Gait and Station:   deferred          Dysphagia Screen  Per Nursing      Imaging  I personally reviewed all imaging; relevant findings per HPI.    Labs Data   CBC  Recent Labs   Lab  07/09/21  0430 07/06/21  1204   WBC 9.6 5.3   RBC 5.60 5.34   HGB 16.4 16.2   HCT 50.5 48.7    247     Basic Metabolic Panel   Recent Labs   Lab 07/09/21  0430 07/06/21  1204    138   POTASSIUM 3.7 4.0   CHLORIDE 106 107   CO2 27 30   BUN 21 12   CR 1.36* 1.25   * 102*   MAYA 9.4 9.2     Liver Panel  Recent Labs   Lab 07/06/21  1204   PROTTOTAL 7.3   ALBUMIN 4.0   BILITOTAL 2.4*   ALKPHOS 83   AST 23   ALT 39     INR    Recent Labs   Lab Test 07/09/21  0430 07/07/21  1241   INR 1.00 1.05      Lipid Profile    Recent Labs   Lab Test 07/06/21  1204   CHOL 127   HDL 58   LDL 50   TRIG 93     A1C    Recent Labs   Lab Test 07/06/21  1204   A1C 5.2     Troponin I    Recent Labs   Lab 07/06/21  1746 07/06/21  1204   TROPI <0.015 <0.015          Stroke Code / Stroke Consult Data Data This was a non-emergent, non-tele stroke consult.

## 2021-07-09 NOTE — ED PROVIDER NOTES
History   Chief Complaint:  Aphasia       HPI   Ravinder Bardales is a 20 year old male with history of CVA who presents with aphasia. The patient reports that he woke up at around 0400 with right arm numbness and aphasia. He has been having difficulty finding words. He also reports feeling shaky. The patient went to bed at 2300 last night. When he left the hospital after his recent stroke, he felt back to normal. Last night, the patient experienced a headache before going to bed. He denies other numbness or weakness in his extremities.     Review of Systems   Constitutional: Negative for fever.   Respiratory: Negative for cough and shortness of breath.    Cardiovascular: Negative for chest pain.   Gastrointestinal: Negative for abdominal pain, nausea and vomiting.   Neurological: Positive for speech difficulty, weakness, numbness and headaches.   All other systems reviewed and are negative.        Allergies:  No known allergies    Medications:  Aspirin    Past Medical History:    CVA  Lymphadenopathy    Past Surgical History:    Appendectomy  Arthroscopy knee  Remove hardware knee    Family History:    No known family history    Social History:  Accompanied by father  Arrives via triage    Physical Exam     Patient Vitals for the past 24 hrs:   BP Pulse Resp SpO2 Weight   07/09/21 0830 136/75 66 19 98 % --   07/09/21 0745 -- 75 15 99 % --   07/09/21 0730 133/79 84 16 100 % --   07/09/21 0630 134/67 64 11 97 % --   07/09/21 0600 124/79 78 14 97 % --   07/09/21 0530 139/78 73 16 98 % --   07/09/21 0515 125/64 66 15 98 % --   07/09/21 0500 127/80 73 12 99 % --   07/09/21 0455 (!) 144/85 68 11 99 % --   07/09/21 0430 (!) 162/96 75 18 100 % 81.6 kg (180 lb)       Physical Exam  General: Appears well-developed and well-nourished.   Head: No signs of trauma.   Mouth/Throat: Oropharynx is clear and moist.   Eyes: Conjunctivae are normal. Pupils are equal, round, and reactive to light.   Neck: Normal range of motion. No  nuchal rigidity. No cervical adenopathy  CV: Normal rate and regular rhythm.    Resp: Effort normal and breath sounds normal. No respiratory distress.   GI: Soft. There is no tenderness.  No rebound or guarding.  Normal bowel sounds.  No CVA tenderness.  MSK: Normal range of motion. no edema. No Calf tenderness.  Neuro: The patient is alert and oriented to person, place, and time.  Slowed, stuttered speech.  Able to hold bilateral extremities up, but weak on right arm and leg compared to left.  Decreased sensation to right face, arm and leg compared to left.    Skin: Skin is warm and dry. No rash noted.   Psych: normal mood and affect. behavior is normal.     [employer] Stroke Scale  Exam Interval: Baseline   Score    Level of consciousness: (0)   Alert, keenly responsive    LOC questions: (0)   Answers both questions correctly    LOC commands: (0)   Performs both tasks correctly    Best gaze: (0)   Normal    Visual: (0)   No visual loss    Facial palsy: (0)   Normal symmetrical movements    Motor arm (left): (0)   No drift    Motor arm (right): (1)   Drift    Motor leg (left): (0)   No drift    Motor leg (right): (1)   Drift    Limb ataxia: (0)   Absent    Sensory: (1)   Mild to moderate sensory loss    Best language: (0)   Normal- no aphasia    Dysarthria: (1)   Mild to moderate dysarthria    Extinction and inattention: (0)   No abnormality        Total Score:  4         Emergency Department Course     Imaging:  MR Brain w/o & w contrast:  1. Multiple small acute infarcts scattered throughout the left   cerebral hemisphere, not significantly changed.   2. No new infarcts are identified., as per radiology.     CT Head perfusion w/ IV contrast:   1.  Normal cerebral perfusion.  as per radiology.    CTA Head Neck w/ IV contrast:   1.  Normal neck CTA.    2. 1.  Normal CTA [place]. As per radiology    CT Head w/o IV contrast:   1.  Normal head CT.  as per radiology.      Laboratory:    BMP:  glucose 104 (H) o/w WNL (Creatinine 1.36 (H))     CBC: WBC 9.6, HGB 16.4,     INR: 1.00    PTT: 26     Emergency Department Course:    Reviewed:  I reviewed nursing notes, vitals, past medical history and care everywhere    Assessments:  0429 I obtained history and examined the patient as noted above.   0907 I rechecked the patien and explained findings and plan.     Consults:   0440 I spoke with stroke neurology  0503 I spoke with stroke neurology  0945 I spoke with Dr. Modi of the Hospitalist service, regarding patient's presentation, findings, and plan of care.       Disposition:  The patient was admitted to the hospital under the care of Dr. Modi.     Impression & Plan     CMS Diagnoses: None    Medical Decision Making:  Ravinder Bardales is a 20-year-old gentleman who presents due to right-sided weakness and expressive aphasia.  He had just been discharged from the hospital yesterday for the same symptoms and had an MRI demonstrating stroke.  The symptoms had completely resolved at the time of discharge.  On my evaluation he did have clear weakness on the right side compared to the left along with a reported decrease in sensation and had slow speech.  Code stroke was called and imaging was obtained.  CT scan did not show any signs of acute process and no signs of any bleeding.  I did speak with the stroke fellow an MRI was obtained.  MRI did not show any signs of any new stroke or change from prior.  While patient was in the ER his symptoms did greatly improve but on close neuro exam he still had some slight continued weakness on the right compared to the left.  The stroke fellow did see the patient in the emergency department and ultimately it was decided to admit the patient for further work-up and evaluation.  We did consider Plavix loading the patient, but neurology team wanted to do some further vascular work-up to explore potential causes and wanted to hold any further antiplatelet agents at this  time.    Critical Care Time: was 60 minutes for this patient excluding procedures    Covid-19  Ravinder Bardales was evaluated during a global COVID-19 pandemic, which necessitated consideration that the patient might be at risk for infection with the SARS-CoV-2 virus that causes COVID-19.   Applicable protocols for evaluation were followed during the patient's care.   COVID-19 was considered as part of the patient's evaluation. The plan for testing is:  a test was obtained during this visit.      Diagnosis:    ICD-10-CM    1. Right sided weakness  R53.1    2. Expressive aphasia  R47.01          Scribe Disclosure:  ITung, am serving as a scribe at 4:29 AM on 7/9/2021 to document services personally performed by Dr. Stacy MD, based on my observations and the provider's statements to me.            Chadwick Kumar MD  07/09/21 1902

## 2021-07-09 NOTE — PROGRESS NOTES
RECEIVING UNIT ED HANDOFF REVIEW    ED Nurse Handoff Report was reviewed by: Terrell Nunez RN on July 9, 2021 at 12:43 PM

## 2021-07-09 NOTE — ED TRIAGE NOTES
Pt presents with slurred speech starting this morning. Pt woke up with slurred  speech and right arm numbness. CVA on 7/6/21.

## 2021-07-09 NOTE — H&P
Admitted: 07/09/2021    PRIMARY CARE PHYSICIAN:  To be assigned at [facility]    CONSULTING NEUROLOGIST:  [facility]    CHIEF COMPLAINT:  Right arm weakness, headache, speech difficulties.    HISTORY OF PRESENT ILLNESS:  Ravinder Bardales is a 20-year-old gentleman who was recently admitted from 07/06-07/08/2021 with multiple acute ischemic strokes when he presented with right arm and leg weakness, speech difficulties and vision difficulties.  The patient had a variety of neurological symptoms over the last 2 weeks, including right arm numbness, right eye blurred vision, fatigue and chest tightness.  He had subsequently MRI of the brain, which showed multiple ischemic strokes involving the left frontal lobe, left parietal lobe, left occipital lobe and left postcentral gyrus.  The patient underwent a stroke workup.  He had an echo, which was positive for small PFO.  He was started on aspirin.  He had normal electrolytes.  His CBC, LFTs were normal.  Lipid panel was normal.  A1c was 5.2.  The PFO was bidirectional. Ultrasound of lower extremities was negative for DVT.  MRV of the pelvis was also negative for DVT.  The patient had a hypercoagulable workup ordered.  He was seen by cardiology for a PFO closure as well.  At the time of discharge, the patient's symptoms had mostly resolved.  The patient was discharged yesterday.    The patient went to bed, very tired last night and he had a headache, which was unusual for him.  The patient woke up around 4 o'clock this morning and he was unable to move his right arm.  He had also speech difficulties, but his vision was okay.  The patient came into [facility] Emergency Department for further assessment.    In the Emergency Department, the patient was seen by Dr. Chadwick Kumar.  He was noted to be afebrile, not overtly hypertensive when he presented.  Normal oxygen saturations.  Normal heart rate.  Blood work revealed normal electrolytes,  BUN of 21, creatinine 1.36 with a calculated GFR of 73, calcium is 9.4.  CBC was normal.  Glucose 104.  INR is 1.  The patient underwent a head CT again without contrast, which was normal head CT.  He also underwent CT angiogram of the [place], which was negative, as was of the neck which was also normal.  He also had a CT perfusion, which showed normal cerebral perfusion.  He did have over an hour's long length of symptoms that were improving.  He subsequently underwent MRI of the brain, which showed multiple small acute infarcts scattered throughout the left cerebral hemisphere are not significantly changed.  No new infarcts identified.  His COVID test was negative.  The patient was seen by Neurology. The patient underwent a lumbar puncture and had a CSF Gram stain and culture and meningitis and encephalitis labs sent out.  All other etiologies including varicella, HSV, EBV and CMV PCRs have been sent.  The patient is continued on his aspirin.  He is being admitted for further treatment.    PAST MEDICAL HISTORY:     1.  Recent left CVA with right-sided deficits.  2.  Small bidirectional PFO.    PAST SURGICAL HISTORY:  Appendectomy, knee arthroscopy.    FAMILY HISTORY:  Reviewed, negative for early strokes.  Maternal grandmother with liver cancer.  There is no diabetes or hypertension.    SOCIAL HISTORY:  No tobacco, occasional alcohol, no drug use.    ALLERGIES:  NO KNOWN DRUG ALLERGIES.    CURRENT MEDICATION LIST:   Aspirin 81 mg daily.    REVIEW OF SYSTEMS:  Ten points reviewed and as dictated in history of present illness.    PHYSICAL EXAMINATION:    VITAL SIGNS:  Temperature 98.3, heart rate 83, respirations 18, blood pressure 114/65, sats are 99% on room air.  GENERAL:  The patient is alert and oriented x 3, IN no distress.  HEENT:  Pupils equal, round, reactive to light.  Sclerae are anicteric.  Extraocular movements are intact.  Tongue is midline.  No facial asymmetry.  NECK:  No nystagmus.  No  facial droop.  No cervical lymphadenopathy.  PULMONARY:  Lungs are clear to auscultation.  CARDIOVASCULAR:  S1, S2, regular rhythm.  GASTROINTESTINAL:  Abdomen is soft, nontender, normoactive bowel sounds.  MUSCULOSKELETAL:  No clubbing, cyanosis or edema.  NEUROLOGIC:  He is oriented x 3.  His speech is still a little bit slower than usual.  His  strength is equal. Second slight decrease in sensation.  His right leg is a little bit weaker with plantar flexion on the left.  SKIN:  Warm, dry, well perfused.  PSYCHIATRIC:  Mood and affect normal.    LABORATORY AND IMAGING STUDIES:  As dictated in history of present illness.      ASSESSMENT:  Ravinder Bardales is 20 with a recent left cerebral stroke in multiple territories thought to be embolic due to a small bidirectional PFO, who has further hypercoagulable workup pending and who comes in again after discharge with similar symptoms from his prior stroke with stroke workup being negative, who underwent lumbar puncture and with labs pending as well as hypercoagulable labs pending, who is overall neurological functions are now slowly returning back to baseline and he is being admitted for further.    PLAN:    1.  Recent left cerebral CVI in the setting of a bidirectional PFO.  The patient now comes in with recurrence of his right-sided deficits as well as a headache in addition to his previous symptoms and other generalized constitutional symptoms, feeling tired.  The patient had repeat imaging studies including head CT noncontrast as well as CT angiogram and MRI.  All of these showed no acute findings.  His symptoms are improved.  A lumbar puncture has been obtained with labs, and hypercoagulable workup is pending.  The patient is pending PFO closure.  At this junction, the differential diagnosis would include possibly complicated migraine versus recurrent stroke-like recrudescence or possible occult CNS infection.  The patient will be seen by Stroke Neurology.  We  will await CSF Gram stain and cultures and various PCRs.  His symptoms have actually improved, though he is still feeling very fatigued.  The patient will be monitored on telemetry.  We will continue the patient on aspirin, noting there are no new strokes on imaging studies.  This would not be aspirin failure at this junction.  The patient will be given normal saline, Tylenol for headaches.  He will undergo serial neuro checks every 4 hours.  2.  Deep venous thrombosis prophylaxis:  The patient will receive compression boots.    CODE STATUS:  Full.    Eliezer Modi MD        D: 2021   T: 2021   MT: DIVYA    Name:     PALMER RAO  MRN:      [id number]        Account:     [id number]   :      2000           Admitted:    2021       Document: [id number]    cc:  [facility]

## 2021-07-09 NOTE — PLAN OF CARE
SLP: Pt familiar to this SLP during previous admission. Pt on the cart waiting for transport. Brief conversation with no obvious dysarthria or aphasia. Pt would be appropriate for nursing screen and regular diet if okay medically/any planned procedures. Please [facility] Speech Therapy if pt fails nursing swallow screen. Will follow 7/10 for any speech/language needs.

## 2021-07-09 NOTE — ED NOTES
"Patients father is at bedside.  He reports that his son lives with him in the basement.  He came up to his room around 4am complaining of right arm numbness, his speech initially was fine.  Around 4:05 he started to have some speech stuttering that his father noticed along with some expressive aphasia.  Father reports the patient had a \"great day\" yesterday.  He was discharged home from hospital with no deficits according to dad.   "

## 2021-07-10 ENCOUNTER — APPOINTMENT (OUTPATIENT)
Dept: MRI IMAGING | Facility: CLINIC | Age: 21
DRG: 065 | End: 2021-07-10
Payer: COMMERCIAL

## 2021-07-10 ENCOUNTER — APPOINTMENT (OUTPATIENT)
Dept: CT IMAGING | Facility: CLINIC | Age: 21
DRG: 065 | End: 2021-07-10
Payer: COMMERCIAL

## 2021-07-10 LAB
ALBUMIN UR-MCNC: NEGATIVE MG/DL
ANION GAP SERPL CALCULATED.3IONS-SCNC: 5 MMOL/L (ref 3–14)
APPEARANCE UR: CLEAR
BILIRUB UR QL STRIP: NEGATIVE
BUN SERPL-MCNC: 17 MG/DL (ref 7–30)
C PNEUM DNA SPEC QL NAA+PROBE: NOT DETECTED
CALCIUM SERPL-MCNC: 9.2 MG/DL (ref 8.5–10.1)
CHLORIDE SERPL-SCNC: 104 MMOL/L (ref 94–109)
CO2 SERPL-SCNC: 28 MMOL/L (ref 20–32)
COLOR UR AUTO: ABNORMAL
CREAT SERPL-MCNC: 1.3 MG/DL (ref 0.66–1.25)
CRP SERPL-MCNC: <2.9 MG/L (ref 0–8)
ERYTHROCYTE [DISTWIDTH] IN BLOOD BY AUTOMATED COUNT: 13 % (ref 10–15)
ERYTHROCYTE [SEDIMENTATION RATE] IN BLOOD BY WESTERGREN METHOD: 4 MM/H (ref 0–15)
FLUAV H1 2009 PAND RNA SPEC QL NAA+PROBE: NOT DETECTED
FLUAV H1 RNA SPEC QL NAA+PROBE: NOT DETECTED
FLUAV H3 RNA SPEC QL NAA+PROBE: NOT DETECTED
FLUAV RNA RESP QL NAA+PROBE: NEGATIVE
FLUAV RNA SPEC QL NAA+PROBE: NOT DETECTED
FLUBV RNA RESP QL NAA+PROBE: NEGATIVE
FLUBV RNA SPEC QL NAA+PROBE: NOT DETECTED
GFR SERPL CREATININE-BSD FRML MDRD: 78 ML/MIN/{1.73_M2}
GLUCOSE BLDC GLUCOMTR-MCNC: 115 MG/DL (ref 70–99)
GLUCOSE BLDC GLUCOMTR-MCNC: 83 MG/DL (ref 70–99)
GLUCOSE SERPL-MCNC: 105 MG/DL (ref 70–99)
GLUCOSE UR STRIP-MCNC: NEGATIVE MG/DL
HADV DNA SPEC QL NAA+PROBE: NOT DETECTED
HCOV PNL SPEC NAA+PROBE: NOT DETECTED
HCT VFR BLD AUTO: 48.2 % (ref 40–53)
HGB BLD-MCNC: 15.8 G/DL (ref 13.3–17.7)
HGB UR QL STRIP: NEGATIVE
HMPV RNA SPEC QL NAA+PROBE: NOT DETECTED
HPIV1 RNA SPEC QL NAA+PROBE: NOT DETECTED
HPIV2 RNA SPEC QL NAA+PROBE: NOT DETECTED
HPIV3 RNA SPEC QL NAA+PROBE: NOT DETECTED
HPIV4 RNA SPEC QL NAA+PROBE: NOT DETECTED
KETONES UR STRIP-MCNC: NEGATIVE MG/DL
LA PPP-IMP: NEGATIVE
LABORATORY COMMENT REPORT: NORMAL
LEUKOCYTE ESTERASE UR QL STRIP: NEGATIVE
M PNEUMO DNA SPEC QL NAA+PROBE: NOT DETECTED
MCH RBC QN AUTO: 30 PG (ref 26.5–33)
MCHC RBC AUTO-ENTMCNC: 32.8 G/DL (ref 31.5–36.5)
MCV RBC AUTO: 92 FL (ref 78–100)
MICROBIOLOGIST REVIEW: NORMAL
MUCOUS THREADS #/AREA URNS LPF: PRESENT /LPF
NITRATE UR QL: NEGATIVE
PH UR STRIP: 6.5 PH (ref 5–7)
PLATELET # BLD AUTO: 233 10E9/L (ref 150–450)
POTASSIUM SERPL-SCNC: 4.4 MMOL/L (ref 3.4–5.3)
RBC # BLD AUTO: 5.26 10E12/L (ref 4.4–5.9)
RBC #/AREA URNS AUTO: 2 /HPF (ref 0–2)
RSV RNA SPEC NAA+PROBE: NEGATIVE
RSV RNA SPEC QL NAA+PROBE: NOT DETECTED
RSV RNA SPEC QL NAA+PROBE: NOT DETECTED
RV+EV RNA SPEC QL NAA+PROBE: NOT DETECTED
SARS-COV-2 RNA RESP QL NAA+PROBE: NEGATIVE
SODIUM SERPL-SCNC: 137 MMOL/L (ref 133–144)
SOURCE: ABNORMAL
SP GR UR STRIP: 1.01 (ref 1–1.03)
SPECIMEN SOURCE: NORMAL
SPECIMEN SOURCE: NORMAL
SPECIMEN TYPE: NORMAL
SQUAMOUS #/AREA URNS AUTO: 0 /HPF (ref 0–1)
TSH SERPL DL<=0.005 MIU/L-ACNC: 1.36 MU/L (ref 0.4–4)
UROBILINOGEN UR STRIP-MCNC: 0 MG/DL (ref 0–2)
VARICELLA ZOSTER DNA PCR COMMENT: NORMAL
VZV DNA SPEC QL NAA+PROBE: NORMAL
WBC # BLD AUTO: 5.9 10E9/L (ref 4–11)
WBC #/AREA URNS AUTO: 2 /HPF (ref 0–5)

## 2021-07-10 PROCEDURE — 120N000001 HC R&B MED SURG/OB

## 2021-07-10 PROCEDURE — 86038 ANTINUCLEAR ANTIBODIES: CPT | Performed by: STUDENT IN AN ORGANIZED HEALTH CARE EDUCATION/TRAINING PROGRAM

## 2021-07-10 PROCEDURE — 86235 NUCLEAR ANTIGEN ANTIBODY: CPT | Performed by: STUDENT IN AN ORGANIZED HEALTH CARE EDUCATION/TRAINING PROGRAM

## 2021-07-10 PROCEDURE — 86769 SARS-COV-2 COVID-19 ANTIBODY: CPT | Performed by: STUDENT IN AN ORGANIZED HEALTH CARE EDUCATION/TRAINING PROGRAM

## 2021-07-10 PROCEDURE — 86706 HEP B SURFACE ANTIBODY: CPT | Performed by: STUDENT IN AN ORGANIZED HEALTH CARE EDUCATION/TRAINING PROGRAM

## 2021-07-10 PROCEDURE — 87486 CHLMYD PNEUM DNA AMP PROBE: CPT | Performed by: STUDENT IN AN ORGANIZED HEALTH CARE EDUCATION/TRAINING PROGRAM

## 2021-07-10 PROCEDURE — 81001 URINALYSIS AUTO W/SCOPE: CPT | Performed by: STUDENT IN AN ORGANIZED HEALTH CARE EDUCATION/TRAINING PROGRAM

## 2021-07-10 PROCEDURE — 82595 ASSAY OF CRYOGLOBULIN: CPT | Performed by: STUDENT IN AN ORGANIZED HEALTH CARE EDUCATION/TRAINING PROGRAM

## 2021-07-10 PROCEDURE — 87476 LYME DIS DNA AMP PROBE: CPT | Performed by: STUDENT IN AN ORGANIZED HEALTH CARE EDUCATION/TRAINING PROGRAM

## 2021-07-10 PROCEDURE — 86618 LYME DISEASE ANTIBODY: CPT | Performed by: STUDENT IN AN ORGANIZED HEALTH CARE EDUCATION/TRAINING PROGRAM

## 2021-07-10 PROCEDURE — 83883 ASSAY NEPHELOMETRY NOT SPEC: CPT | Performed by: STUDENT IN AN ORGANIZED HEALTH CARE EDUCATION/TRAINING PROGRAM

## 2021-07-10 PROCEDURE — 70549 MR ANGIOGRAPH NECK W/O&W/DYE: CPT

## 2021-07-10 PROCEDURE — 83876 ASSAY MYELOPEROXIDASE: CPT | Performed by: STUDENT IN AN ORGANIZED HEALTH CARE EDUCATION/TRAINING PROGRAM

## 2021-07-10 PROCEDURE — 250N000011 HC RX IP 250 OP 636: Performed by: INTERNAL MEDICINE

## 2021-07-10 PROCEDURE — 86705 HEP B CORE ANTIBODY IGM: CPT | Performed by: STUDENT IN AN ORGANIZED HEALTH CARE EDUCATION/TRAINING PROGRAM

## 2021-07-10 PROCEDURE — 36415 COLL VENOUS BLD VENIPUNCTURE: CPT | Performed by: INTERNAL MEDICINE

## 2021-07-10 PROCEDURE — 87631 RESP VIRUS 3-5 TARGETS: CPT | Performed by: STUDENT IN AN ORGANIZED HEALTH CARE EDUCATION/TRAINING PROGRAM

## 2021-07-10 PROCEDURE — 82784 ASSAY IGA/IGD/IGG/IGM EACH: CPT | Performed by: STUDENT IN AN ORGANIZED HEALTH CARE EDUCATION/TRAINING PROGRAM

## 2021-07-10 PROCEDURE — 87521 HEPATITIS C PROBE&RVRS TRNSC: CPT | Performed by: STUDENT IN AN ORGANIZED HEALTH CARE EDUCATION/TRAINING PROGRAM

## 2021-07-10 PROCEDURE — 999N000147 HC STATISTIC PT IP EVAL DEFER

## 2021-07-10 PROCEDURE — 999N001036 HC STATISTIC TOTAL PROTEIN: Performed by: STUDENT IN AN ORGANIZED HEALTH CARE EDUCATION/TRAINING PROGRAM

## 2021-07-10 PROCEDURE — 86200 CCP ANTIBODY: CPT | Performed by: STUDENT IN AN ORGANIZED HEALTH CARE EDUCATION/TRAINING PROGRAM

## 2021-07-10 PROCEDURE — 86334 IMMUNOFIX E-PHORESIS SERUM: CPT | Performed by: STUDENT IN AN ORGANIZED HEALTH CARE EDUCATION/TRAINING PROGRAM

## 2021-07-10 PROCEDURE — 250N000009 HC RX 250: Performed by: INTERNAL MEDICINE

## 2021-07-10 PROCEDURE — 86160 COMPLEMENT ANTIGEN: CPT | Performed by: STUDENT IN AN ORGANIZED HEALTH CARE EDUCATION/TRAINING PROGRAM

## 2021-07-10 PROCEDURE — 85027 COMPLETE CBC AUTOMATED: CPT | Performed by: INTERNAL MEDICINE

## 2021-07-10 PROCEDURE — 86708 HEPATITIS A ANTIBODY: CPT | Performed by: STUDENT IN AN ORGANIZED HEALTH CARE EDUCATION/TRAINING PROGRAM

## 2021-07-10 PROCEDURE — 86692 HEPATITIS DELTA AGENT ANTBDY: CPT | Performed by: STUDENT IN AN ORGANIZED HEALTH CARE EDUCATION/TRAINING PROGRAM

## 2021-07-10 PROCEDURE — 255N000002 HC RX 255 OP 636: Performed by: INTERNAL MEDICINE

## 2021-07-10 PROCEDURE — 87086 URINE CULTURE/COLONY COUNT: CPT | Performed by: STUDENT IN AN ORGANIZED HEALTH CARE EDUCATION/TRAINING PROGRAM

## 2021-07-10 PROCEDURE — 84165 PROTEIN E-PHORESIS SERUM: CPT | Mod: 26 | Performed by: PATHOLOGY

## 2021-07-10 PROCEDURE — 87798 DETECT AGENT NOS DNA AMP: CPT | Performed by: STUDENT IN AN ORGANIZED HEALTH CARE EDUCATION/TRAINING PROGRAM

## 2021-07-10 PROCEDURE — 84443 ASSAY THYROID STIM HORMONE: CPT | Performed by: INTERNAL MEDICINE

## 2021-07-10 PROCEDURE — 87633 RESP VIRUS 12-25 TARGETS: CPT | Performed by: STUDENT IN AN ORGANIZED HEALTH CARE EDUCATION/TRAINING PROGRAM

## 2021-07-10 PROCEDURE — 99233 SBSQ HOSP IP/OBS HIGH 50: CPT | Performed by: INTERNAL MEDICINE

## 2021-07-10 PROCEDURE — 83516 IMMUNOASSAY NONANTIBODY: CPT | Performed by: STUDENT IN AN ORGANIZED HEALTH CARE EDUCATION/TRAINING PROGRAM

## 2021-07-10 PROCEDURE — 999N001017 HC STATISTIC GLUCOSE BY METER IP

## 2021-07-10 PROCEDURE — 85652 RBC SED RATE AUTOMATED: CPT | Performed by: INTERNAL MEDICINE

## 2021-07-10 PROCEDURE — 87635 SARS-COV-2 COVID-19 AMP PRB: CPT | Performed by: STUDENT IN AN ORGANIZED HEALTH CARE EDUCATION/TRAINING PROGRAM

## 2021-07-10 PROCEDURE — 250N000013 HC RX MED GY IP 250 OP 250 PS 637: Performed by: PSYCHIATRY & NEUROLOGY

## 2021-07-10 PROCEDURE — 999N000226 HC STATISTIC SLP IP EVAL DEFER: Performed by: SPEECH-LANGUAGE PATHOLOGIST

## 2021-07-10 PROCEDURE — 99233 SBSQ HOSP IP/OBS HIGH 50: CPT | Mod: GC | Performed by: PSYCHIATRY & NEUROLOGY

## 2021-07-10 PROCEDURE — 86431 RHEUMATOID FACTOR QUANT: CPT | Performed by: STUDENT IN AN ORGANIZED HEALTH CARE EDUCATION/TRAINING PROGRAM

## 2021-07-10 PROCEDURE — 70553 MRI BRAIN STEM W/O & W/DYE: CPT

## 2021-07-10 PROCEDURE — 250N000013 HC RX MED GY IP 250 OP 250 PS 637: Performed by: INTERNAL MEDICINE

## 2021-07-10 PROCEDURE — 87535 HIV-1 PROBE&REVERSE TRNSCRPJ: CPT | Performed by: STUDENT IN AN ORGANIZED HEALTH CARE EDUCATION/TRAINING PROGRAM

## 2021-07-10 PROCEDURE — 80048 BASIC METABOLIC PNL TOTAL CA: CPT | Performed by: INTERNAL MEDICINE

## 2021-07-10 PROCEDURE — 70544 MR ANGIOGRAPHY HEAD W/O DYE: CPT

## 2021-07-10 PROCEDURE — 86704 HEP B CORE ANTIBODY TOTAL: CPT | Performed by: STUDENT IN AN ORGANIZED HEALTH CARE EDUCATION/TRAINING PROGRAM

## 2021-07-10 PROCEDURE — A9585 GADOBUTROL INJECTION: HCPCS | Performed by: INTERNAL MEDICINE

## 2021-07-10 PROCEDURE — 87581 M.PNEUMON DNA AMP PROBE: CPT | Performed by: STUDENT IN AN ORGANIZED HEALTH CARE EDUCATION/TRAINING PROGRAM

## 2021-07-10 PROCEDURE — 86334 IMMUNOFIX E-PHORESIS SERUM: CPT | Mod: 26 | Performed by: PATHOLOGY

## 2021-07-10 PROCEDURE — 87340 HEPATITIS B SURFACE AG IA: CPT | Performed by: STUDENT IN AN ORGANIZED HEALTH CARE EDUCATION/TRAINING PROGRAM

## 2021-07-10 PROCEDURE — 86255 FLUORESCENT ANTIBODY SCREEN: CPT | Performed by: STUDENT IN AN ORGANIZED HEALTH CARE EDUCATION/TRAINING PROGRAM

## 2021-07-10 PROCEDURE — 86225 DNA ANTIBODY NATIVE: CPT | Performed by: STUDENT IN AN ORGANIZED HEALTH CARE EDUCATION/TRAINING PROGRAM

## 2021-07-10 PROCEDURE — 84165 PROTEIN E-PHORESIS SERUM: CPT | Mod: TC | Performed by: STUDENT IN AN ORGANIZED HEALTH CARE EDUCATION/TRAINING PROGRAM

## 2021-07-10 PROCEDURE — 258N000003 HC RX IP 258 OP 636: Performed by: INTERNAL MEDICINE

## 2021-07-10 PROCEDURE — 80307 DRUG TEST PRSMV CHEM ANLYZR: CPT | Performed by: STUDENT IN AN ORGANIZED HEALTH CARE EDUCATION/TRAINING PROGRAM

## 2021-07-10 PROCEDURE — 86790 VIRUS ANTIBODY NOS: CPT | Performed by: STUDENT IN AN ORGANIZED HEALTH CARE EDUCATION/TRAINING PROGRAM

## 2021-07-10 PROCEDURE — 86709 HEPATITIS A IGM ANTIBODY: CPT | Performed by: STUDENT IN AN ORGANIZED HEALTH CARE EDUCATION/TRAINING PROGRAM

## 2021-07-10 PROCEDURE — 71260 CT THORAX DX C+: CPT

## 2021-07-10 PROCEDURE — 86140 C-REACTIVE PROTEIN: CPT | Performed by: INTERNAL MEDICINE

## 2021-07-10 PROCEDURE — 87389 HIV-1 AG W/HIV-1&-2 AB AG IA: CPT | Performed by: STUDENT IN AN ORGANIZED HEALTH CARE EDUCATION/TRAINING PROGRAM

## 2021-07-10 PROCEDURE — 250N000013 HC RX MED GY IP 250 OP 250 PS 637: Performed by: STUDENT IN AN ORGANIZED HEALTH CARE EDUCATION/TRAINING PROGRAM

## 2021-07-10 PROCEDURE — 36415 COLL VENOUS BLD VENIPUNCTURE: CPT | Performed by: STUDENT IN AN ORGANIZED HEALTH CARE EDUCATION/TRAINING PROGRAM

## 2021-07-10 PROCEDURE — 87516 HEPATITIS B DNA AMP PROBE: CPT | Performed by: STUDENT IN AN ORGANIZED HEALTH CARE EDUCATION/TRAINING PROGRAM

## 2021-07-10 PROCEDURE — 86803 HEPATITIS C AB TEST: CPT | Performed by: STUDENT IN AN ORGANIZED HEALTH CARE EDUCATION/TRAINING PROGRAM

## 2021-07-10 PROCEDURE — 86334 IMMUNOFIX E-PHORESIS SERUM: CPT | Mod: TC | Performed by: STUDENT IN AN ORGANIZED HEALTH CARE EDUCATION/TRAINING PROGRAM

## 2021-07-10 RX ORDER — ASPIRIN 81 MG/1
81 TABLET, CHEWABLE ORAL DAILY
Status: DISCONTINUED | OUTPATIENT
Start: 2021-07-10 | End: 2021-07-14 | Stop reason: HOSPADM

## 2021-07-10 RX ORDER — CLOPIDOGREL BISULFATE 75 MG/1
75 TABLET ORAL DAILY
Status: DISCONTINUED | OUTPATIENT
Start: 2021-07-10 | End: 2021-07-13

## 2021-07-10 RX ORDER — SODIUM CHLORIDE 9 MG/ML
INJECTION, SOLUTION INTRAVENOUS CONTINUOUS
Status: ACTIVE | OUTPATIENT
Start: 2021-07-10 | End: 2021-07-10

## 2021-07-10 RX ORDER — IOPAMIDOL 755 MG/ML
89 INJECTION, SOLUTION INTRAVASCULAR ONCE
Status: COMPLETED | OUTPATIENT
Start: 2021-07-10 | End: 2021-07-10

## 2021-07-10 RX ORDER — GADOBUTROL 604.72 MG/ML
10 INJECTION INTRAVENOUS ONCE
Status: COMPLETED | OUTPATIENT
Start: 2021-07-10 | End: 2021-07-10

## 2021-07-10 RX ADMIN — GADOBUTROL 10 ML: 604.72 INJECTION INTRAVENOUS at 09:36

## 2021-07-10 RX ADMIN — ACETAMINOPHEN 650 MG: 325 TABLET, FILM COATED ORAL at 21:44

## 2021-07-10 RX ADMIN — CLOPIDOGREL BISULFATE 75 MG: 75 TABLET ORAL at 16:25

## 2021-07-10 RX ADMIN — ACETAMINOPHEN 650 MG: 325 TABLET, FILM COATED ORAL at 14:31

## 2021-07-10 RX ADMIN — SODIUM CHLORIDE: 9 INJECTION, SOLUTION INTRAVENOUS at 10:50

## 2021-07-10 RX ADMIN — ASPIRIN 81 MG CHEWABLE TABLET 81 MG: 81 TABLET CHEWABLE at 08:17

## 2021-07-10 RX ADMIN — SODIUM CHLORIDE 67 ML: 9 INJECTION, SOLUTION INTRAVENOUS at 09:57

## 2021-07-10 RX ADMIN — IOPAMIDOL 89 ML: 755 INJECTION, SOLUTION INTRAVENOUS at 09:57

## 2021-07-10 ASSESSMENT — ACTIVITIES OF DAILY LIVING (ADL)
ADLS_ACUITY_SCORE: 14
ADLS_ACUITY_SCORE: 15
ADLS_ACUITY_SCORE: 14
ADLS_ACUITY_SCORE: 15
ADLS_ACUITY_SCORE: 15
ADLS_ACUITY_SCORE: 14

## 2021-07-10 NOTE — PLAN OF CARE
DATE & TIME: 7/9 1900-0730    Cognitive Concerns/ Orientation : A/Ox4   BEHAVIOR & AGGRESSION TOOL COLOR: green  CIWA SCORE: NA   ABNL VS/O2: VSS on RA  MOBILITY: up ind, steady, calls appropriately  PAIN MANAGMENT: Had HA, but resolved  DIET: regular   BOWEL/BLADDER: continent   ABNL LAB/BG:  LP results pending, small PFO on ECHO   DRAIN/DEVICES: PIV SL   TELEMETRY RHYTHM: SB  SKIN: intact  TESTS/PROCEDURES: pending LP results   D/C DAY/GOALS/PLACE: pending progress  OTHER IMPORTANT INFO: Neuros: slight R arm drift, some R face tingling, HA but resolved, pt states he feels like his symptoms are improving

## 2021-07-10 NOTE — PLAN OF CARE
A&O. Neuros intact except for right arm drift, some numbness on right face/arm, and 4/5 right leg movement. VSS. Tele SR. Regular diet. Lumbar puncture site flat/dry and intact. Up independent. PRN tylenol given for 3/10 headache and 6/10 left arm  pain. Pt scoring green on the Aggression Stop Light Tool.

## 2021-07-10 NOTE — PLAN OF CARE
OT: OT orders received, chart reviewed, note that hospitalist already placed discharge note. PT talked with nursing who reports no acute PT/OT needs. Will complete orders.

## 2021-07-10 NOTE — PLAN OF CARE
"SLP: Pt familiar to this SLP during previous admission. Provided thorough explanation of SLP services.     Swallow: Pt reported two instances of difficulty swallowing especially when laying flat for MRI. Pt wonders if dry mouth is contributing. No difficulty with lunch tray. No s/sx of aspiration.    Speech/language: Pt concerned with few instances of \"stuttering\" or inability to speak at all. One instance occurred today with repeating back sentence with MD. Pt reported quickly being able to speak again. Reviewed likely anomic aphasia and apraxia. No deficits in lengthy conversation. Pt is familiar with Outpatient SLP services for post-concussion deficits. Pt was agreeable to orders for Outpatient SLP evaluation if he would like to follow up.     No IP SLP services indicated at this time. Please re-consult if any changes.   "

## 2021-07-10 NOTE — PLAN OF CARE
DATE & TIME: 7/10/2021 DAY                      Cognitive Concerns/ Orientation : A&Ox4   BEHAVIOR & AGGRESSION TOOL COLOR: green  CIWA SCORE: NA    ABNL VS/O2: VSS on RA ex bradycardic (40-50's)  MOBILITY: indep, steady on feet   PAIN MANAGMENT: c/o buttock/lower back cramping, resolved with tylenol.   DIET: regular   BOWEL/BLADDER: continent   ABNL LAB/BG:  echo- small PFO, UA (-), COVID (-). Other labs still pending.   DRAIN/DEVICES: PIV SL   TELEMETRY RHYTHM: sinus luc   SKIN: lumbar puncture site- bandaid CDI.   TESTS/PROCEDURES: lumbar puncture yest, MRI (-), CT (-), Angio to be complete Monday.   D/C DAY/GOALS/PLACE: pending progress; plan is for angio Monday.   OTHER IMPORTANT INFO: Neuros: slight R arm drift, tingling on R side of face and R hand this am but resolved this afternoon.    Addendum [id number]: pt had slight headache but declined interventions. Otherwise neuros intact-  Tingling on R side of body resolved.

## 2021-07-10 NOTE — PLAN OF CARE
PT: PT orders received, chart reviewed, note that hospitalist already placed discharge note. Discussed with nsg who reports he is young and back to baseline, therefore no acute PT/OT needs. Will complete orders.

## 2021-07-10 NOTE — PROGRESS NOTES
[facility]      Date of Admission:  7/9/2021      Discharge Diagnoses   Please refer below     History of Present Illness   Ravinder Bardales is an 20 year old male who presented with stroke symptoms     Hospital Course   Ravinder Bardales is a 20 year old male with no significant past medical history who presents with a variety of neurological symptoms over the last 2 weeks and is admitted with acute stroke, found to have embolic stroke with unknown source and PFO, discharge on aspirin 81 mg daily with follow up with Neurology and cardiology for PFO closure now admitted back gain with right upper extremity weakness, numbness, slurred speech, blurry vision and headache.              Recent Multiple acute ischemic strokes/ESUS with PFO   Patient presented initially with variety of neurological symptoms over the last 2 weeks [right arm numbness, right eye blurred vision, left arm numbness, loss of balance], fatigue and chest tightness.   He had a MRI of the brain with and without contrast shows multiple ischemic stroke involving left frontal lobe, left parietal lobe and left occipital lobe and left postcentral gyrus in the vicinity of the hand knob.      Likely his symptoms related to stroke, likely embolic. He had TTE done which is positive for bubble study.   He was on Aspirin 325 mg daily for now and switch to 81 mg daily on discharge. Labs work looks normal. Normal electrolytes, renal function, cbc, LFT's and Lipid panel. Hemoglobin A1c is 5.2     Now come back again with right upper extremity numbness, weakness, headache, blurry vision and slurred speech.  Repeat MRI on admission shows no acute stroke dose shows multiple small acute infarct not significantly changed.     He subsequently admitted and Neurology was consulted, previous work up include TTE, PHUONG with finding of PFO, US LE negative for DVT, MRV pelvis negative for any clots. Thrombophilia work up  Protein C and S, Prothrombin gene  mutation, Factor V leiden, cardiolipin antibody, beta 2 glycoprotein IgG and IgM, antithrombin 3 and lupus anticoagulant.   Protein C and S, GABRIEL, cardiolipin antibody, beta 2 glycoprotein IgG and IgM, antithrombin 3 come back negative, others pending at this time.     He has repeat MRI brain with and without contrast today, MRA head and Neck and CT chest abdomen and pelvis with contrast. Repeat MRI does not show new infarct, MRA remain negative, CT chest abdomen and pelvis was normal as well.   LP done on admission, show elevated WBC of 60 with 90% lymphocytes. Cause is uncertain, as CSF protein and glucose normal. Gram stain negative, culture negative so far, HSV, Cryptococcus and Meningitis/encephalitis panel negative. CRP normal.     Neurology is following and doing further work up including infectious, connective tissue and vasculitis workup. Lyme antibody and DNA by PCR added and pending at this time. He continue to have pronator drift on right side, slight weakness and blurry vision. Awaiting further recommendations from neurology.      PFO  Chest tightness  EKG and troponin normal.  Echo shows EF of 55-60%, normal diastolic function, no WMA.  No significant valve disease.   No more chest pain or tightness at this time. .   He does have small PFO, Cardiology has evaluated him during his previous admission and recommended closure.        Await further recommendation by Neurology at this time.     Homero Yi MD, MD    Significant Results and Procedures       Pending Results   These results will be followed up by PCP  Unresulted Labs Ordered in the Past 30 Days of this Admission     Date and Time Order Name Status Description    7/10/2021 0932 Respiratory Panel PCR - NP Swab In process     7/10/2021 0932 Influenza A and B and RSV PCR In process     7/10/2021 0928 Drug Confirmation Panel Urine with Creat In process     7/10/2021 0928 Urine Culture In process     7/10/2021 0826 Anti Nuclear Evelia IgG by IFA with  Reflex In process     7/10/2021 0826 DNA double stranded antibodies In process     7/10/2021 0826 Cyclic Citrullinated Peptide Antibody IgG In process     7/10/2021 0826 ANCA IgG by IFA with Reflex to Titer In process     7/10/2021 0748 Lyme disease DNA detection by PCR In process     7/10/2021 0748 B Burgdorferi Evelia CSF: Tube 3 In process     7/10/2021 0748 Lyme IgG and IgM CSF Immunoblot: Tube 3 In process     7/10/2021 0748 Lyme Disease Evelia with reflex to WB Serum In process     7/9/2021 1432 Fungus Culture, non-blood In process     7/9/2021 0949 Cytomegalovirus Quant PCR Non Blood Tube 3 In process     7/9/2021 0949 EBV PCR Quantitative Serum Plasma CSF In process     7/9/2021 0949 Oligoclonal banding CSF Tube 3 and Blood In process     7/9/2021 0949 VDRL CSF: Tube 2 In process     7/9/2021 0949 Varicella Zoster DNA PCR CSF or Skin Swab Tube 3 In process     7/9/2021 0949 Freeze Sample CSF Tube 3: Laboratory Miscellaneous Order In process     7/9/2021 0949 Cytology non gyn CSF Tube 4 In process     7/9/2021 0949 CSF Culture Aerobic Bacterial Tube 2 Preliminary     7/8/2021 0001 Homocysteine In process     7/7/2021 0916 Factor 5 leiden mutation analysis In process     7/7/2021 0916 Factor 2 and 5 mutation analysis In process     7/7/2021 0916 F2 prothrombin [id number] Mut Anal In process           Code Status   Full Code       Primary Care Physician   Physician No Ref-Primary    Physical Exam   Temp: 98.7  F (37.1  C) Temp src: Oral BP: (!) 145/79 Pulse: 65   Resp: 20 SpO2: 98 % O2 Device: None (Room air)    Vitals:    07/09/21 0430 07/09/21 1256   Weight: 81.6 kg (180 lb) 80.7 kg (177 lb 14.6 oz)     Vital Signs with Ranges  Temp:  [98.1  F (36.7  C)-98.7  F (37.1  C)] 98.7  F (37.1  C)  Pulse:  [61-82] 65  Resp:  [16-20] 20  BP: (113-145)/(59-79) 145/79  SpO2:  [97 %-100 %] 98 %  I/O last 3 completed shifts:  In: 650 [P.O.:250; I.V.:400]  Out: -     Constitutional: awake, alert, cooperative, no apparent distress,  and appears stated age  Eyes: Lids and lashes normal, pupils equal, round and reactive to light, extra ocular muscles intact, sclera clear, conjunctiva normal  Respiratory: No increased work of breathing, good air exchange, clear to auscultation bilaterally, no crackles or wheezing  Cardiovascular: Normal apical impulse, regular rate and rhythm, normal S1 and S2, no S3 or S4, and no murmur noted  GI: No scars, normal bowel sounds, soft, non-distended, non-tender, no masses palpated, no hepatosplenomegally  Skin: no bruising or bleeding  Musculoskeletal: no lower extremity pitting edema present  Neurologic: awake alert and oriented, has minor right drift and slight weakness as compare to left on right upper extremity.   Still have some blurry vision.     Discharge Disposition   Discharged to home  Condition at discharge: Satisfactory    Consultations This Hospital Stay   PATIENT LEARNING CENTER IP CONSULT  SWALLOW EVAL SPEECH PATH AT BEDSIDE IP CONSULT  SPEECH LANGUAGE PATH ADULT IP CONSULT  PHARMACY IP CONSULT  PHARMACY IP CONSULT  PHARMACY IP CONSULT  PHYSICAL THERAPY ADULT IP CONSULT  OCCUPATIONAL THERAPY ADULT IP CONSULT  CARE MANAGEMENT / SOCIAL WORK IP CONSULT  NEUROLOGY IP CONSULT  SMOKING CESSATION PROGRAM IP CONSULT    Time Spent on this Encounter   IHomero MD, personally saw the patient today and spent greater than 30 minutes discharging this patient.    Discharge Orders   No discharge procedures on file.  Discharge Medications   Current Discharge Medication List      CONTINUE these medications which have NOT CHANGED    Details   aspirin (ASA) 81 MG EC tablet Take 1 tablet (81 mg) by mouth daily  Qty: 90 tablet, Refills: 0    Associated Diagnoses: Cerebrovascular accident (CVA), unspecified mechanism (H)           Allergies   No Known Allergies  Data   Most Recent 3 CBC's:  Recent Labs   Lab Test 07/10/21  0826 07/09/21  0430 07/06/21  1204   WBC 5.9 9.6 5.3   HGB 15.8 16.4 16.2   MCV 92 90 91     275 247      Most Recent 3 BMP's:  Recent Labs   Lab Test 07/10/21  0826 07/09/21  0430 07/06/21  1204    138 138   POTASSIUM 4.4 3.7 4.0   CHLORIDE 104 106 107   CO2 28 27 30   BUN 17 21 12   CR 1.30* 1.36* 1.25   ANIONGAP 5 5 1*   MAYA 9.2 9.4 9.2   * 104* 102*     Most Recent 2 LFT's:  Recent Labs   Lab Test 07/06/21  1204 09/14/18  0854   AST 23 22   ALT 39 28   ALKPHOS 83 98   BILITOTAL 2.4* 2.1*     Most Recent INR's and Anticoagulation Dosing History:  Anticoagulation Dose History     Recent Dosing and Labs Latest Ref Rng & Units 7/7/2021 7/9/2021    INR 0.86 - 1.14 1.05 1.00        Most Recent 3 Troponin's:  Recent Labs   Lab Test 07/06/21  1746 07/06/21  1204   TROPI <0.015 <0.015     Most Recent Cholesterol Panel:  Recent Labs   Lab Test 07/06/21  1204   CHOL 127   LDL 50   HDL 58   TRIG 93     Most Recent 6 Bacteria Isolates From Any Culture (See EPIC Reports for Culture Details):  Recent Labs   Lab Test 07/09/21  1432 10/05/15  1458 01/08/15  1755   CULT Culture negative monitoring continues No Beta Streptococcus isolated No Beta Streptococcus isolated     Most Recent TSH, T4 and A1c Labs:  Recent Labs   Lab Test 07/10/21  0826 07/06/21  1204   TSH 1.36 1.10   A1C  --  5.2     Results for orders placed or performed during the hospital encounter of 07/06/21   MR Brain w/o & w Contrast    Narrative    MRI BRAIN WITHOUT AND WITH CONTRAST  7/6/2021 3:13 PM     HISTORY: Right hand numbness.     TECHNIQUE:  Multiplanar, multisequence MRI of the brain without and  with 10 mL Gadavist.     COMPARISON: None.     FINDINGS: Scattered cortical areas of restricted diffusion in the left  cerebral hemisphere. These are located in the left frontal lobe, left  parietal lobe, and left occipital lobe. In particular, there are  regions of restricted diffusion in the left postcentral gyrus in the  vicinity of the hand knob. No corresponding areas of susceptibility  hypointensity to suggest regions of  associated hemorrhage. No mass  effect or midline shift. No evidence of acute intracranial hemorrhage.  Ventricular size is within normal limits without evidence of  hydrocephalus.    The facial structures appear normal.     No abnormal intracranial enhancement.      Impression    IMPRESSION:    1. Several areas of abnormal cortical restricted diffusion in the left  cerebral hemisphere most suggestive of cortical infarcts. No evidence  of hemorrhagic transformation of infarct. No mass effect or midline  shift. No restricted diffusion in the right cerebral hemisphere.  2. Aside from the areas of apparent cortical infarcts, no other signal  abnormality within the brain parenchyma.      MARIA ANTONIA FALL MD         SYSTEM ID:  [id number]   MRA Brain ([place]) wo Contrast    Narrative    MR ANGIOGRAM OF THE HEAD WITHOUT CONTRAST   7/6/2021 2:52 PM     HISTORY: Neurological deficit, acute, stroke suspected.    TECHNIQUE:  3D time-of-flight MR angiogram of the head without  contrast.    COMPARISON: None.    FINDINGS: The major intracranial arteries including the proximal  branches of the anterior cerebral, middle cerebral, and posterior  cerebral arteries appear patent without vascular cutoff. No aneurysm  identified. No significant stenosis.      Impression    IMPRESSION:  Normal MR angiogram of the head.        MARIA ANTONIA FALL MD         SYSTEM ID:  [id number]   MRA Neck (Carotids) wo & w Contrast    Narrative    MRA NECK WITHOUT AND WITH CONTRAST  7/6/2021 3:14 PM     HISTORY: Neurological deficit, acute, stroke suspected     TECHNIQUE: 2D time-of-flight MR angiogram of the neck without contrast  and 3D MR angiogram of the neck with  10 mL Gadavist . Estimates of  carotid stenoses are made relative to the distal internal carotid  artery diameters except as noted.     COMPARISON: None.     FINDINGS:    Normal origin of the great vessels from the aortic arch.     Right carotid artery: The right common and internal carotid  arteries  are patent. No significant stenosis.     Left carotid artery: The left common and internal carotid arteries are  patent. No significant stenosis.     Vertebral arteries: Vertebral arteries appear patent without evidence  of dissection. No significant stenosis.       Impression    IMPRESSION:  Normal MR angiogram of the neck.       MARIA ANTONIA FALL MD         SYSTEM ID:  [id number]   MR Cervical Spine w/o Contrast    Narrative    MRI CERVICAL SPINE WITHOUT CONTRAST 7/6/2021 2:51 PM     HISTORY: Right arm numbness. Aphasia.     TECHNIQUE: Multiplanar, multisequence MRI of the cervical spine  without contrast.     COMPARISON: None.     FINDINGS: Normal cervical lordosis. Anterior posterior alignment of  the spine is within normal limits. Vertebral body height is maintained  without evidence of fracture. There are no destructive osseous  lesions. No STIR hyperintense endplate edema (Modic type I changes).    No abnormal signal appreciated within the visualized spinal cord.    Level by level as follows:     C2-C3: No loss of disc height. No significant disc herniation. Normal  facets. No spinal canal or neural foraminal narrowing.     C3-C4: Mild loss of disc height. Shallow posterior disc bulge. Normal  facets. No spinal canal or neural foraminal narrowing.     C4-C5: Mild loss of disc height. Small right foraminal disc protrusion  with right uncinate spurring. Normal facets. No spinal canal  narrowing. Mild right neural foraminal narrowing. No left neural  foraminal narrowing.     C5-C6: Mild loss of disc height. Shallow posterior disc bulge. Normal  facets. No spinal canal or neural foraminal narrowing.     C6-C7: No loss of disc height. No significant disc herniation. Normal  facets. No spinal canal or neural foraminal narrowing.     C7-T1: No loss of disc height. No significant disc herniation. Normal  facets. No spinal canal or neural foraminal narrowing.     Paraspinous soft tissues are unremarkable.        Impression    IMPRESSION:    1. Mild degenerative disc changes in the mid cervical spine as  detailed above.  2. Mild right neural foraminal narrowing at C4-C5. No spinal canal  narrowing at any level.  3. No abnormal signal appreciated within the cervical spinal cord.       MARIA ANTONIA FALL MD         SYSTEM ID:  [id number]   US Lower Extremity Venous Duplex Bilateral    Narrative    ULTRASOUND BILATERAL LOWER EXTREMITIES VENOUS DUPLEX July 7, 2021 1:59  PM    CLINICAL HISTORY: Extremity numbness. Chest tightness. Fatigue. Patent  foramen ovale. Stroke.    TECHNIQUE: Venous Duplex ultrasound of bilateral lower extremities  with and without compression, augmentation and duplex. Color flow and  spectral Doppler with waveform analysis performed.    COMPARISON: None.    FINDINGS: Exam includes the common femoral, femoral, popliteal veins  as well as segmentally visualized deep calf veins and greater  saphenous vein.     RIGHT: No deep vein thrombosis. No superficial thrombophlebitis. No  popliteal cyst.    LEFT: No deep vein thrombosis. No superficial thrombophlebitis. No  popliteal cyst.      Impression    IMPRESSION: No deep venous thrombosis in the bilateral lower  extremities.    JESUS ROJAS MD         SYSTEM ID:  [id number]   MRV Pelvis wwo Contrast    Narrative    MRV PELVIS WITHOUT AND WITH CONTRAST 7/7/2021 7:27 PM    HISTORY:  20-year-old patient with small patent foramen ovale with  cryptogenic stroke. Request made for evaluation of venous thrombus  within pelvis.    COMPARISON: None. No previous MRI examinations, though CT exam was  performed September 18, 2018.    TECHNIQUE: Multiplanar and multiformatted MRI and MRV images obtained  through the pelvis before and after the uneventful administration of  Gadavist intravenous contrast given for a total a 15 mL.    FINDINGS: Visible solid organs are unremarkable. No dilated loops of  bowel. The abdominal aorta is widely patent. Celiac axis, SMA, and CORI  appear  patent. Iliac arteries are patent bilaterally. Both common  iliac, external iliac, and common femoral veins are patent without  obvious filling defect. No obvious filling defects in internal iliac  veins to suggest thrombus. No dilated pelvic veins. The inferior vena  cava is patent.      Impression    IMPRESSION: Patent iliac venous systems bilaterally. No obviously  distended pelvic veins. No obvious pelvic venous filling defects.    LYRIC LOZANO MD         SYSTEM ID:  [id number]   Echocardiogram Complete     Value    LVEF  55-60%    LVEF  55-60%    Narrative    [id number]  [facility]  [id number]  [id number]^SHELIA^ILA^KATY     [facility]  Echocardiography Laboratory  [address] [place] [id number]     Name: PALMER RAO  MRN: [id number]  : 2000  Study Date: 2021 03:48 PM  Age: 20 yrs  Gender: Male  Patient Location: [facility]  Reason For Study: CVA  Ordering Physician: ILA BASS  Performed By: Mari Salinas     BSA: 2.0 m2  Height: 70 in  Weight: 179 lb  HR: 64  BP: 134/76 mmHg  ______________________________________________________________________________  Procedure  Complete Portable Bubble Echo Adult.  ______________________________________________________________________________  Interpretation Summary     The visual ejection fraction is 55-60%.  The right ventricle is normal in size and function.  A contrast injection (Bubble Study) was performed that was mildly positive for  flow across the interatrial septum.  No significant valve disease.  ______________________________________________________________________________  Left Ventricle  The left ventricle is normal in structure, function and size. The visual  ejection fraction is 55-60%. Diastolic Doppler findings (E/E' ratio and/or  other parameters) suggest left ventricular filling pressures are normal. No  regional wall motion abnormalities noted.     Right Ventricle  The right ventricle is normal in size and  function.     Atria  Normal left atrial size. Right atrial size is normal. A contrast injection  (Bubble Study) was performed that was mildly positive for flow across the  interatrial septum. A patent foramen ovale is present.     Mitral Valve  The mitral valve is normal in structure and function. There is trace mitral  regurgitation. There is no mitral valve stenosis.     Tricuspid Valve  The tricuspid valve is normal in structure and function. The right ventricular  systolic pressure is approximated at 11.9 mmHg plus the right atrial pressure.  There is trace tricuspid regurgitation.     Aortic Valve  The aortic valve is normal in structure and function.     Pulmonic Valve  The pulmonic valve is not well visualized.     Vessels  The aortic root is normal size. The inferior vena cava was normal in size with  preserved respiratory variability.     Pericardium  There is no pericardial effusion.     ______________________________________________________________________________  MMode/2D Measurements & Calculations  IVSd: 0.72 cm  LVIDd: 5.0 cm  LVIDs: 3.4 cm  LVPWd: 1.1 cm  FS: 32.5 %     LV mass(C)d: 162.7 grams  LV mass(C)dI: 81.7 grams/m2  Ao root diam: 2.9 cm  LA dimension: 3.0 cm  asc Aorta Diam: 2.6 cm  LA/Ao: 1.0  LVOT diam: 2.2 cm  LVOT area: 3.8 cm2  LA Volume (BP): 47.6 ml  LA Volume Index (BP): 23.9 ml/m2  RWT: 0.45     Doppler Measurements & Calculations  MV E max kike: 88.3 cm/sec  MV A max kike: 41.6 cm/sec  MV E/A: 2.1     MV dec slope: 511.6 cm/sec2  MV dec time: 0.17 sec  PA acc time: 0.16 sec  TR max kike: 172.7 cm/sec  TR max P.9 mmHg  E/E' av.5  Lateral E/e': 4.6  Medial E/e': 8.4     ______________________________________________________________________________  Report approved by: Maliha Wood 2021 04:48 PM         Transesophageal Echocardiogram     Value    LVEF  55-60%    Narrative    [id number]  [facility]  [id number]  [id number]^KRYSTIAN Murphy  [facility]  Echocardiography Laboratory  [address] [place] [id number]     Name: PALMER RAO  MRN: [id number]  : 2000  Study Date: 2021 10:34 AM  Age: 20 yrs  Gender: Male  Patient Location: [facility]  Reason For Study: CVA  Ordering Physician: YVONNE GOOD  Performed By: Umang Marti     BSA: 2.0 m2  Height: 70 in  Weight: 179 lb  HR: 80  BP: 121/62 mmHg  ______________________________________________________________________________  Procedure  Complete PHUONG Adult. PHUONG Probe #[id number] was also used during the procedure.  ______________________________________________________________________________  Interpretation Summary     There is a small patent foramen ovale (PFO) present. There is mild bi-  directional shunting present, with Left to Right shunting on color Doppler,  and Right to Left shunting with a mildly positive bubble study with the  Valsalva maneuver only. Bubble study was negative at rest. Right atrial aspect  defect measures 0.1 cm. Left atrial aspect defect measures 0.1 cm. Tunnel  length measures 1.2 cm. Aortic valve rim (aortic short-axis) measures 0.9 cm.  AV valve rim (4-chamber) measures 1.1 cm. SVC rim (bi-caval) measures 1.3 cm.  Interatrial septum is not aneurysmal, there are no fenestrations.  Sweep image obtained (labeled SWEEP).     Left ventricular size, global systolic function, and wall motion are normal,  estimated LVEF 55-60%.  Right ventricular global function is normal.  No significant valvular abnormalities.  The left atrial appendage was well visualized and free of thrombus.  ______________________________________________________________________________  PHUONG  I determined this patient to be an appropriate candidate for the planned  sedation and procedure and have reassessed the patient immediately prior to  sedation and procedure. Total sedation time: 25 minutes of continuous bedside  1:1 monitoring. Versed (6mg) was given intravenously. Fentanyl (100mcg)  was  given intravenously. Consent to the procedure was obtained prior to sedation.  Prior to the exam, the oral cavity was checked and no overcrowding was noted.  The transesophageal probe was passed without difficulty. There were no  complications associated with this procedure.     Left Ventricle  The left ventricle is normal in size. There is normal left ventricular wall  thickness. Left ventricular systolic function is normal. The visual ejection  fraction is 55-60%. No regional wall motion abnormalities noted.     Right Ventricle  The right ventricle is normal in structure, function and size.     Atria  Normal left atrial size. Right atrial size is normal. A patent foramen ovale  is present. There is a small patent foramen ovale (PFO) present. There is bi-  directional shunting present, with Left to Right shunting on color Doppler,  and Right to Left shunting with a mildly positive bubble study with the  Valsalva maneuver only. Bubble study was negative at rest.  Right atrial aspect defect measures 0.1 cm. Left atrial aspect defect measures  0.1 cm. Tunnel length measures 1.2 cm. Aortic valve rim (aortic short-axis)  measures 0.9 cm. AV valve rim (4-chamber) measures 1.1 cm. SVC rim (bi-caval)  measures 1.3 cm.  Sweep image obtained (labeled SWEEP). Interatrial septum is not aneurysmal,  there are no fenestrations. The left atrial appendage was well visualized and  free of thrombus.     Mitral Valve  The mitral valve is normal in structure and function. There is physiologic  mitral regurgitation.     Tricuspid Valve  The tricuspid valve is normal in structure and function.     Aortic Valve  The aortic valve is normal in structure and function. The aortic valve is  trileaflet.     Pulmonic Valve  The pulmonic valve is normal in structure and function.     Vessels  The aortic root is normal size. Normal size ascending aorta. Normal pulmonary  vein velocity.     Pericardial/Pleural  There is no pericardial  effusion.  ______________________________________________________________________________  Report approved by: Maliha De La Cruz 07/07/2021 11:51 AM     ______________________________________________________________________________          Most Recent 3 CBC's:  Recent Labs   Lab Test 07/10/21  0826 07/09/21  0430 07/06/21  1204   WBC 5.9 9.6 5.3   HGB 15.8 16.4 16.2   MCV 92 90 91    275 247     Most Recent 3 BMP's:  Recent Labs   Lab Test 07/10/21  0826 07/09/21  0430 07/06/21  1204    138 138   POTASSIUM 4.4 3.7 4.0   CHLORIDE 104 106 107   CO2 28 27 30   BUN 17 21 12   CR 1.30* 1.36* 1.25   ANIONGAP 5 5 1*   MAYA 9.2 9.4 9.2   * 104* 102*     Most Recent 2 LFT's:  Recent Labs   Lab Test 07/06/21  1204 09/14/18  0854   AST 23 22   ALT 39 28   ALKPHOS 83 98   BILITOTAL 2.4* 2.1*

## 2021-07-10 NOTE — PROGRESS NOTES
[facility]    Stroke Consult Note    Reason for Consult:  Aphasia and right sided weakness    Chief Complaint: Aphasia       HPI  Ravinder Bardales is a 20 year old male with past medical history of previous knee surgery.  Patient presents after a series of neurologic complaints which started Saturday.  He states symptoms first started while he was Valsalva while using the restroom and started with an electric-like sensation that went down the right arm leading to several minutes of arm weakness and and more prolonged numbness.  This was followed by word finding difficulty where he was able to understand everyone he just was unable to get the words out and had stuttering expressive speech.  This was followed by right eye vision loss described as dark coming from the superior vision and extending to the lower vision and resolving over a period of minutes leaving some blurry vision before fully resolving.  The timing of the symptoms lasted approximately 20 minutes total with some prolonged numbness in the right arm.  A day later Sunday 7/4 patient was noted to have a left arm numbness and weakness which resolved.  Monday 7/5 patient was riding his motorcycle and he had a repeat of vision loss similar in semiology as above.    Patient is representing after he was discharged 7/8/2021 he went home and hung out with some friends in the evening and fell asleep around 10 PM and when he woke up he had right arm numbness, weakness, word finding difficulty.        Stroke Summary and Impression  Acute ischemic stroke of L MCA due to cardioembolism  Patient of such a young age with PFO found on echocardiogram likely secondary to embolism/cardioembolism/paradoxical embolism.    Further consideration for multiple strokes and waxing and waning symptoms would be for infectious cause or inflammatory/vasculitis.        Assessment   CT Head Late presentation, not performed   MRI   MRI 7/6/21      MRI  7/9/21:  Seems like expected evolution of previous seen infarcts.     MRI 7/10:  No further stroke seen   Head Vessel Imaging  no significant stenosis   Neck Vessel Imaging  no significant stenosis   Cardiac TTE:   The visual ejection fraction is 55-60%.  The right ventricle is normal in size and function.  A contrast injection (Bubble Study) was performed that was mildly positive for  flow across the interatrial septum.  No significant valve disease.  PHUONG:  There is a small patent foramen ovale (PFO) present. There is mild bi-  directional shunting present, with Left to Right shunting on color Doppler,  and Right to Left shunting with a mildly positive bubble study with the  Valsalva maneuver only. Bubble study was negative at rest. Right atrial aspect  defect measures 0.1 cm. Left atrial aspect defect measures 0.1 cm. Tunnel  length measures 1.2 cm. Aortic valve rim (aortic short-axis) measures 0.9 cm.  AV valve rim (4-chamber) measures 1.1 cm. SVC rim (bi-caval) measures 1.3 cm.  Interatrial septum is not aneurysmal, there are no fenestrations.  Sweep image obtained (labeled SWEEP).     Left ventricular size, global systolic function, and wall motion are normal,  estimated LVEF 55-60%.  Right ventricular global function is normal.  No significant valvular abnormalities.  The left atrial appendage was well visualized and free of thrombus.   EKG Sinus   Blood Pressure Goal: < 140/80   Antiplt/Anticoag     LDL               Lab Results   Component Value Date/Time     LDL 50 07/06/2021 12:04 PM       A1C               Lab Results   Component Value Date/Time     A1C 5.2 07/06/2021 12:04 PM       Troponin               Lab Results   Component Value Date/Time     TROPI <0.015 07/06/2021 05:46 PM     TROPI <0.015 07/06/2021 12:04 PM        MRV pelvis and doppler unremarkable   CT Chest abdom pelvis 1.  No abnormal masses or lymphadenopathy in the chest, abdomen, or  pelvis.     2.  Vasculature appears normal.   CSF  studies CSF results:  Lab Results   Component Value Date    CRBC  07/09/2021     Test not performed. Criteria not met for second cell count.    CRBC 0 07/09/2021    CWBC  07/09/2021     Test not performed. Criteria not met for second cell count.    CWBC 60 (H) 07/09/2021    CTP 35 07/09/2021    CGLU 58 07/09/2021     Lab Results   Component Value Date/Time    HSCSF1 Not Detected 07/09/2021 02:32 PM    HSCSF2 Not Detected 07/09/2021 02:32 PM     Lab Results   Component Value Date/Time    MEK1 Negative 07/09/2021 02:32 PM    MEHI Negative 07/09/2021 02:32 PM    MELIST Negative 07/09/2021 02:32 PM    MEMEN Negative 07/09/2021 02:32 PM    MEGBS Negative 07/09/2021 02:32 PM    MESPN Negative 07/09/2021 02:32 PM    MECMV Negative 07/09/2021 02:32 PM    MEENT Negative 07/09/2021 02:32 PM    MEHS1 Negative 07/09/2021 02:32 PM    MEHS2 Negative 07/09/2021 02:32 PM    MEHV6 Negative 07/09/2021 02:32 PM    MEPAR Negative 07/09/2021 02:32 PM    MEVZV Negative 07/09/2021 02:32 PM    MECRP Negative 07/09/2021 02:32 PM    MECOM  07/09/2021 02:32 PM     Assay performed using the FDA-cleared FilmArray ME Panel from [employer]     VZV: Negative  Crypto: Negative  Lyme: IgG Pending  B Burgdorferi: Pending  Lyme disease LUKAS: Pending  Lyme disease DNA: Pending  VDRL Pending  OCB: Pending  EBV: Pending  CMV: Pending  Fungus Culture: pending  CSF Culture: pending  Gram stain: no organisms           Autoimmune labs Lab Results   Component Value Date/Time    CRP <2.9 07/10/2021 08:26 AM    SED 4 07/10/2021 08:26 AM     TSH   Date Value Ref Range Status   07/10/2021 1.36 0.40 - 4.00 mU/L Final     ANCA IgG: Pending  CCP: Pending  Double-stranded DNA: Pending  Antinuclear antibody: Pending  Myeloperoxidase and proteinase 3: Pending  Rheumatoid factor: Pending  Protein immunofixation serum: Pending  HIV: Pending  Hepatitis B: Pending  Hepatitis A: Pending  Hepatitis C: Pending  Hepatitis D: Pending  Hepatitis C pending  SSA/SSB:  Pending  Benitez antibody: Pending  RNP antibody: Pending  Elidia 1 antibody: Pending  SCL 70: Pending  Centromere antibody: Pending  Cryoglobulin identification: Pending  Protein electrophoresis: Pending  N lambda light chain: Pending  C3/C4: Pending               PLAN     Further Imaging  Cerebral Angio on Monday likely   Blood Pressure Goal: < 140/80   Antiplt/Anticoag   Continue Asa 81mg qday    Plavix 75mg qday    LDL      Goal < 70    At goal   A1C Defer to primary team, goal A1c < 7   Tobacco Recommend tobacco cessation if applicable   Rehab Physical Therapy   Occupational Therapy    Speech Therapy    Secondary Stroke Prevention ? Alcohol consumption: men< or= 2 drinks per day, nonpregnant women< or= 1 drink per day  ? Physical activity: at least 30 minutes of moderate intensity exercise 1-3 times per week  ? Diet: low fat, low sodium, Mediterranean diet  ? Goal BMI 18.5-25   ? Gradually increase activity and limit exertion as PFO shunt increased with valsalva          Heart Monitor Please connect cardiac monitor for 30 days at dc      Consults Cardiology on board PFO   Rheum/Autoimmune Labs > Laboratory Studies  (ordered)  >UA  >GABRIEL  > ESR  >CRP  > HIV  >A1C  >CPK  >TSH  >GABRIEL  > dsDNA  > ANCA (proteinase 3, myeloperoxidase)  >CCP  > Hepatitis C  > Hepatitis B  > HIV  > RF  > Extractable nuclear antigen antibodies  > SSA/SSB  > Anti-Sm  > Anti-RNP  > Anti-Elidia  > Anti-Scl70  > Anticentromere  > Cryoglobulin  > SPEP  >SHEYLA  > Serum Free light chain  > C3  >C4   Hypercoag Labs Pending  - GABRIEL  - Homocysteine  - Prot C  - Prot S  - Lupus AC  - Factor 5, 2, Prothrombin  - Anticardiolipin  - Beta 2 glycoprotein  - ATIII    Further work up    Further considerations        Patient Follow-up    - final recommendation pending work-up    Thank you for this consult. We will continue to follow.     The Stroke Staff is Dr. Patricia.    Kodi Egan MD  Vascular Neurology Fellow  To page me or covering stroke neurology team member,  "click here: [facility]   Choose \"On Call\" tab at top, then search dropdown box for \"Neurology Adult\", select location, press Enter, then look for stroke/neuro ICU/telestroke.    _____________________________________________________    Risk Factors Present on Admission              #PFO   Past Medical History   No past medical history on file.  Past Surgical History   Past Surgical History:   Procedure Laterality Date     APPENDECTOMY       ARTHROSCOPY KNEE Left 11/6/2015    Procedure: ARTHROSCOPY KNEE;  Surgeon: Koko Babb MD;  [facility]     ARTHROSCOPY KNEE WITH FIXATION OF OSTEOCHONDRAL DISSECANS Left 3/17/2015    Procedure: ARTHROSCOPY KNEE WITH FIXATION OSTEOCHONDRITIS DESSICANS;  Surgeon: Koko Babb MD;  [facility]     NO HISTORY OF SURGERY       ORTHOPEDIC SURGERY       REMOVE HARDWARE KNEE Left 11/6/2015    Procedure: REMOVE HARDWARE KNEE;  Surgeon: Koko Babb MD;  [facility]     Medications   Home Meds  Prior to Admission medications    Medication Sig Start Date End Date Taking? Authorizing Provider   aspirin (ASA) 81 MG EC tablet Take 1 tablet (81 mg) by mouth daily 7/9/21   Homero Yi MD   clopidogrel (PLAVIX) 75 MG tablet Take 1 tablet (75 mg) by mouth daily 7/9/21 7/9/21  Chadwick Kumar MD       Scheduled Meds    aspirin  81 mg Oral Daily     sodium chloride (PF)  3 mL Intracatheter Q8H       Infusion Meds    - MEDICATION INSTRUCTIONS -       - MEDICATION INSTRUCTIONS -       - MEDICATION INSTRUCTIONS -       sodium chloride 0.9%       sodium chloride 125 mL/hr at 07/10/21 1050       PRN Meds  acetaminophen **OR** acetaminophen, lidocaine 4%, lidocaine (buffered or not buffered), - MEDICATION INSTRUCTIONS -, - MEDICATION INSTRUCTIONS -, - MEDICATION INSTRUCTIONS -, ondansetron **OR** ondansetron, sodium chloride (PF), sodium chloride 0.9%    Allergies   No Known Allergies  Family History   Family History   Problem Relation Age " of Onset     Cancer Maternal Grandmother         liver cancer     Diabetes No family hx of      Hypertension No family hx of      Social History   Social History     Tobacco Use     Smoking status: Never Smoker     Smokeless tobacco: Never Used   Substance Use Topics     Alcohol use: Yes     Comment: occassionally     Drug use: No       Review of Systems   The 10 point Review of Systems is negative other than noted in the HPI or here.        PHYSICAL EXAMINATION   Temp:  [98.1  F (36.7  C)-98.7  F (37.1  C)] 98.7  F (37.1  C)  Pulse:  [61-82] 65  Resp:  [16-20] 20  BP: (113-145)/(59-79) 145/79  SpO2:  [97 %-100 %] 98 %    General physical exam:    HEENT: normocephalic, eyes open with no discharge, nares patent, oropharynx is clear with no lesions, palate intact  CV: regular rate  Chest: normal configuration, no respiratory distress  Ab: soft, non-distended  Skin: no rashes or lesions    Neuro exam:    Mental status:   Awake, alert, and oriented to person, place, time and location/situation    Speech:   Normal Abnormal   Fluency X    Comprehension X    Articulation X    Repetition X    Naming X      Cranial Nerves:   Normal Abnormal   II Pupils equal (3mm), round,    Visual Fields are full to confrontation    Ill, IV, VI EOMI, no nystagmus    V Intact to LT in V1-3    VII  Mild right facial droop   VIII Hearing intact to voice    IX,X -    XI Shrug equal    XII Tongue midline        Muscle/motor:   Tone: Normal   Bulk: Normal   Fasciculations: None observed      Pronator drift: R noted without pronation    Neck Flexion    Neck Extension         Right Left  Right Left   Shoulder abductors: 5 5 Hip flexors: 5 5   Elbow flexors: 5 5 Hip abductors:     Elbow extensors: 5 5 Hip extensors:     Wrist extensors:   Hip adductors:     Wrist flexors:   Knee flexors: 5 5   Finger flexors: 5 5 Knee extensors: 5 5   Finger extensors:   Ankle plantar flexors: 5 5   Finger abductors:   Ankle dorsiflexors: 5 5   Thumb abductors:    Ankle inversion:        Ankle eversion:        Toe extensors:        Toe flexors:       No abnormal movements, rigidity or spasticity    Reflexes:     Right Left   Triceps 1 1   Biceps 1 1   Brachioradialis 1 1   Patella 1 1   Ankle 1 1   Plantar     Graham     Jaw              Sensation:   Normal  RUE LUE RLE LLE   Light Touch x             Coordination:    No gross ataxia    Gait and Station:   deferred          Dysphagia Screen  Per Nursing      Imaging  I personally reviewed all imaging; relevant findings per HPI.    Labs Data   CBC  Recent Labs   Lab 07/10/21  0826 07/09/21  0430 07/06/21  1204   WBC 5.9 9.6 5.3   RBC 5.26 5.60 5.34   HGB 15.8 16.4 16.2   HCT 48.2 50.5 48.7    275 247     Basic Metabolic Panel   Recent Labs   Lab 07/10/21  0826 07/09/21  0430 07/06/21  1204    138 138   POTASSIUM 4.4 3.7 4.0   CHLORIDE 104 106 107   CO2 28 27 30   BUN 17 21 12   CR 1.30* 1.36* 1.25   * 104* 102*   MAYA 9.2 9.4 9.2     Liver Panel  Recent Labs   Lab 07/06/21  1204   PROTTOTAL 7.3   ALBUMIN 4.0   BILITOTAL 2.4*   ALKPHOS 83   AST 23   ALT 39     INR    Recent Labs   Lab Test 07/09/21  0430 07/07/21  1241   INR 1.00 1.05      Lipid Profile    Recent Labs   Lab Test 07/06/21  1204   CHOL 127   HDL 58   LDL 50   TRIG 93     A1C    Recent Labs   Lab Test 07/06/21  1204   A1C 5.2     Troponin I    Recent Labs   Lab 07/06/21  1746 07/06/21  1204   TROPI <0.015 <0.015          Stroke Code / Stroke Consult Data Data This was a non-emergent, non-tele stroke consult.

## 2021-07-11 LAB
ANION GAP SERPL CALCULATED.3IONS-SCNC: 5 MMOL/L (ref 3–14)
B BURGDOR AB CSF IA-ACNC: 0.06 LIV
B BURGDOR IGG CSF QL IB: NEGATIVE
B BURGDOR IGM CSF QL IB: NEGATIVE
BACTERIA SPEC CULT: NO GROWTH
BUN SERPL-MCNC: 17 MG/DL (ref 7–30)
CALCIUM SERPL-MCNC: 9.3 MG/DL (ref 8.5–10.1)
CHLORIDE SERPL-SCNC: 104 MMOL/L (ref 94–109)
CK SERPL-CCNC: 58 U/L (ref 30–300)
CO2 SERPL-SCNC: 26 MMOL/L (ref 20–32)
CREAT SERPL-MCNC: 1.21 MG/DL (ref 0.66–1.25)
GFR SERPL CREATININE-BSD FRML MDRD: 85 ML/MIN/{1.73_M2}
GLUCOSE SERPL-MCNC: 101 MG/DL (ref 70–99)
LACTATE BLD-SCNC: 0.7 MMOL/L (ref 0.7–2)
Lab: NORMAL
POTASSIUM SERPL-SCNC: 4.1 MMOL/L (ref 3.4–5.3)
SODIUM SERPL-SCNC: 135 MMOL/L (ref 133–144)
SPECIMEN SOURCE: NORMAL
VDRL CSF QL: NON REACTIVE

## 2021-07-11 PROCEDURE — 120N000001 HC R&B MED SURG/OB

## 2021-07-11 PROCEDURE — 250N000013 HC RX MED GY IP 250 OP 250 PS 637: Performed by: INTERNAL MEDICINE

## 2021-07-11 PROCEDURE — 99233 SBSQ HOSP IP/OBS HIGH 50: CPT | Performed by: INTERNAL MEDICINE

## 2021-07-11 PROCEDURE — 83605 ASSAY OF LACTIC ACID: CPT | Performed by: STUDENT IN AN ORGANIZED HEALTH CARE EDUCATION/TRAINING PROGRAM

## 2021-07-11 PROCEDURE — 80048 BASIC METABOLIC PNL TOTAL CA: CPT | Performed by: STUDENT IN AN ORGANIZED HEALTH CARE EDUCATION/TRAINING PROGRAM

## 2021-07-11 PROCEDURE — 36415 COLL VENOUS BLD VENIPUNCTURE: CPT | Performed by: STUDENT IN AN ORGANIZED HEALTH CARE EDUCATION/TRAINING PROGRAM

## 2021-07-11 PROCEDURE — 250N000013 HC RX MED GY IP 250 OP 250 PS 637: Performed by: STUDENT IN AN ORGANIZED HEALTH CARE EDUCATION/TRAINING PROGRAM

## 2021-07-11 PROCEDURE — 99233 SBSQ HOSP IP/OBS HIGH 50: CPT | Mod: GC | Performed by: PSYCHIATRY & NEUROLOGY

## 2021-07-11 PROCEDURE — 82550 ASSAY OF CK (CPK): CPT | Performed by: STUDENT IN AN ORGANIZED HEALTH CARE EDUCATION/TRAINING PROGRAM

## 2021-07-11 PROCEDURE — 250N000013 HC RX MED GY IP 250 OP 250 PS 637: Performed by: PSYCHIATRY & NEUROLOGY

## 2021-07-11 RX ORDER — IBUPROFEN 200 MG
400 TABLET ORAL EVERY 6 HOURS PRN
Status: DISCONTINUED | OUTPATIENT
Start: 2021-07-11 | End: 2021-07-14 | Stop reason: HOSPADM

## 2021-07-11 RX ADMIN — IBUPROFEN 400 MG: 200 TABLET, FILM COATED ORAL at 23:41

## 2021-07-11 RX ADMIN — ACETAMINOPHEN 650 MG: 325 TABLET, FILM COATED ORAL at 20:37

## 2021-07-11 RX ADMIN — IBUPROFEN 400 MG: 200 TABLET, FILM COATED ORAL at 16:59

## 2021-07-11 RX ADMIN — ACETAMINOPHEN 650 MG: 325 TABLET, FILM COATED ORAL at 13:34

## 2021-07-11 RX ADMIN — CLOPIDOGREL BISULFATE 75 MG: 75 TABLET ORAL at 09:30

## 2021-07-11 RX ADMIN — ASPIRIN 81 MG CHEWABLE TABLET 81 MG: 81 TABLET CHEWABLE at 09:30

## 2021-07-11 RX ADMIN — ACETAMINOPHEN 650 MG: 325 TABLET, FILM COATED ORAL at 06:02

## 2021-07-11 ASSESSMENT — ACTIVITIES OF DAILY LIVING (ADL)
ADLS_ACUITY_SCORE: 14
ADLS_ACUITY_SCORE: 12
ADLS_ACUITY_SCORE: 14
ADLS_ACUITY_SCORE: 12
ADLS_ACUITY_SCORE: 12
ADLS_ACUITY_SCORE: 14

## 2021-07-11 NOTE — PROVIDER NOTIFICATION
"Text page to hospitalist: \"patient C/O low back and upper buttock pain surrounding near lumbar puncture site. Feels achey, no tingling or numbness. Feels it has worsened in last few days. Please advise, thanks.\"    Orders received: ibuprofen 400mg Q6hrs PRN, and CTM  "

## 2021-07-11 NOTE — PLAN OF CARE
Date & Time: 7/11 1634-1118  Diagnosis: Expressive aphasia   Procedures: 7/9 LP, 7/12 angio   Orientation/Cognitive: AOx4  VS/O2: VSS, RA   Mobility: Independent   Diet: Regular  Pain Management: Denies  Bowel & Bladder: Continent   Skin: WDL  Abnormal Labs: WDL  Tele: NSR   IV Access/Drips/Fluids: R PIV SL   Drains: NA  Tests: CT - negative, MRI - PTA strokes, no change, ECHO - EF 55-60%, + PFO, LP - pending  Consults: Cardiology, stroke neurology, hospitalist    Discharge Plan: Pending angio 7/12  Other: Neuros - intact    Patient transferring to [facility], report given to BAL Reyes.

## 2021-07-11 NOTE — PLAN OF CARE
Pt here with left MCA. A&O. Neuros intact, some complaint of fuzzy vision at times. VSS. Tele SR. Regular diet, thin liquids. Takes pills whole. Up independently in the room. C/O headache, buttock and low back pain. Given tylenol PRN x 1 and ibuprofen x 1. Voiding adequately. Pt scoring green on the Aggression Stop Light Tool. Plan for cerebral angiogram tomorrow. Discharge pending results.  No home meds  Patient belongings remain in room

## 2021-07-11 NOTE — PLAN OF CARE
DATE & TIME: 7/11/2021:                      Cognitive Concerns/ Orientation : A&Ox4   BEHAVIOR & AGGRESSION TOOL COLOR: green  CIWA SCORE: NA    ABNL VS/O2: VSS on RA ex luc HR in the 50s at times   MOBILITY: steady gait, independent   PAIN MANAGMENT: Headache relieved with prn tylenol  DIET: regular   BOWEL/BLADDER: continent   ABNL LAB/BG:  awaits pending lab result   DRAIN/DEVICES: PIV SL   TELEMETRY RHYTHM:SB   SKIN: lumbar puncture site YUDY-CDI  TESTS/PROCEDURES: Pending diagnostic angiogram tomorrow-Monday  D/C DAY/GOALS/PLACE:To be discharge pending improvement                       OTHER IMPORTANT INFO: Neuros intact except headache-managed with tylenol.

## 2021-07-11 NOTE — PROGRESS NOTES
[facility]    Stroke Consult Note    Subjective:  Patient feeling he is more symptomatic when he is tired.     HPI  Ravinder Bardales is a 20 year old male with past medical history of previous knee surgery.  Patient presents after a series of neurologic complaints which started Saturday.  He states symptoms first started while he was Valsalva while using the restroom and started with an electric-like sensation that went down the right arm leading to several minutes of arm weakness and and more prolonged numbness.  This was followed by word finding difficulty where he was able to understand everyone he just was unable to get the words out and had stuttering expressive speech.  This was followed by right eye vision loss described as dark coming from the superior vision and extending to the lower vision and resolving over a period of minutes leaving some blurry vision before fully resolving.  The timing of the symptoms lasted approximately 20 minutes total with some prolonged numbness in the right arm.  A day later Sunday 7/4 patient was noted to have a left arm numbness and weakness which resolved.  Monday 7/5 patient was riding his motorcycle and he had a repeat of vision loss similar in semiology as above.    Patient is representing after he was discharged 7/8/2021 he went home and hung out with some friends in the evening and fell asleep around 10 PM and when he woke up he had right arm numbness, weakness, word finding difficulty.        Stroke Summary and Impression  Acute ischemic stroke of L MCA due to cardioembolism  Patient of such a young age with PFO found on echocardiogram likely secondary to embolism/cardioembolism/paradoxical embolism.    Further consideration for multiple strokes and waxing and waning symptoms would be for infectious cause or inflammatory/vasculitis.        Assessment   CT Head Late presentation, not performed   MRI   MRI 7/6/21      MRI 7/9/21:  Seems like  expected evolution of previous seen infarcts.     MRI 7/10:  No further stroke seen   Head Vessel Imaging  no significant stenosis   Neck Vessel Imaging  no significant stenosis   Cardiac TTE:   The visual ejection fraction is 55-60%.  The right ventricle is normal in size and function.  A contrast injection (Bubble Study) was performed that was mildly positive for  flow across the interatrial septum.  No significant valve disease.  PHUONG:  There is a small patent foramen ovale (PFO) present. There is mild bi-  directional shunting present, with Left to Right shunting on color Doppler,  and Right to Left shunting with a mildly positive bubble study with the  Valsalva maneuver only. Bubble study was negative at rest. Right atrial aspect  defect measures 0.1 cm. Left atrial aspect defect measures 0.1 cm. Tunnel  length measures 1.2 cm. Aortic valve rim (aortic short-axis) measures 0.9 cm.  AV valve rim (4-chamber) measures 1.1 cm. SVC rim (bi-caval) measures 1.3 cm.  Interatrial septum is not aneurysmal, there are no fenestrations.  Sweep image obtained (labeled SWEEP).     Left ventricular size, global systolic function, and wall motion are normal,  estimated LVEF 55-60%.  Right ventricular global function is normal.  No significant valvular abnormalities.  The left atrial appendage was well visualized and free of thrombus.   EKG Sinus   Blood Pressure Goal: < 140/80   Antiplt/Anticoag     LDL               Lab Results   Component Value Date/Time     LDL 50 07/06/2021 12:04 PM       A1C               Lab Results   Component Value Date/Time     A1C 5.2 07/06/2021 12:04 PM       Troponin               Lab Results   Component Value Date/Time     TROPI <0.015 07/06/2021 05:46 PM     TROPI <0.015 07/06/2021 12:04 PM        MRV pelvis and doppler unremarkable   CT Chest abdom pelvis 1.  No abnormal masses or lymphadenopathy in the chest, abdomen, or  pelvis.     2.  Vasculature appears normal.   CSF studies CSF results:  Lab  Results   Component Value Date    CRBC  07/09/2021     Test not performed. Criteria not met for second cell count.    CRBC 0 07/09/2021    CWBC  07/09/2021     Test not performed. Criteria not met for second cell count.    CWBC 60 (H) 07/09/2021    CTP 35 07/09/2021    CGLU 58 07/09/2021     Lab Results   Component Value Date/Time    HSCSF1 Not Detected 07/09/2021 02:32 PM    HSCSF2 Not Detected 07/09/2021 02:32 PM     Lab Results   Component Value Date/Time    MEK1 Negative 07/09/2021 02:32 PM    MEHI Negative 07/09/2021 02:32 PM    MELIST Negative 07/09/2021 02:32 PM    MEMEN Negative 07/09/2021 02:32 PM    MEGBS Negative 07/09/2021 02:32 PM    MESPN Negative 07/09/2021 02:32 PM    MECMV Negative 07/09/2021 02:32 PM    MEENT Negative 07/09/2021 02:32 PM    MEHS1 Negative 07/09/2021 02:32 PM    MEHS2 Negative 07/09/2021 02:32 PM    MEHV6 Negative 07/09/2021 02:32 PM    MEPAR Negative 07/09/2021 02:32 PM    MEVZV Negative 07/09/2021 02:32 PM    MECRP Negative 07/09/2021 02:32 PM    MECOM  07/09/2021 02:32 PM     Assay performed using the FDA-cleared FilmArray ME Panel from [employer]     VZV: Negative  Crypto: Negative  Lyme: IgG Pending  B Burgdorferi: Pending  Lyme disease LUKAS: Pending  Lyme disease DNA: Pending  VDRL Pending  OCB: Pending  EBV: Pending  CMV: Pending  Fungus Culture: pending  CSF Culture: pending  Gram stain: no organisms           Autoimmune labs Lab Results   Component Value Date/Time    CRP <2.9 07/10/2021 08:26 AM    SED 4 07/10/2021 08:26 AM     TSH   Date Value Ref Range Status   07/10/2021 1.36 0.40 - 4.00 mU/L Final     ANCA IgG: Pending  CCP: Pending  Double-stranded DNA: Pending  Antinuclear antibody: Pending  Myeloperoxidase and proteinase 3: Pending  Rheumatoid factor: Pending  Protein immunofixation serum: Pending  HIV: Pending  Hepatitis B: Pending  Hepatitis A: Pending  Hepatitis C: Pending  Hepatitis D: Pending  Hepatitis C pending  SSA/SSB: Pending  Smith antibody:  "Pending  RNP antibody: Pending  Elidia 1 antibody: Pending  SCL 70: Pending  Centromere antibody: Pending  Cryoglobulin identification: Pending  Protein electrophoresis: Pending  N lambda light chain: Pending  C3/C4: Pending     Hyper coag labs Labs Pending  - GABRIEL  - Homocysteine  - Prot C  - Prot S  - Lupus AC  - Factor 5, 2, Prothrombin  - Anticardiolipin  - Beta 2 glycoprotein  - ATIII         PLAN     Further Imaging  Cerebral Angio on Monday likely   Blood Pressure Goal: < 140/80   Antiplt/Anticoag   Continue Asa 81mg qday    Plavix 75mg qday    LDL      Goal < 70    At goal   A1C Defer to primary team, goal A1c < 7   Tobacco Recommend tobacco cessation if applicable   Rehab Physical Therapy   Occupational Therapy    Speech Therapy    Secondary Stroke Prevention ? Alcohol consumption: men< or= 2 drinks per day, nonpregnant women< or= 1 drink per day  ? Physical activity: at least 30 minutes of moderate intensity exercise 1-3 times per week  ? Diet: low fat, low sodium, Mediterranean diet  ? Goal BMI 18.5-25   ? Gradually increase activity and limit exertion as PFO shunt increased with valsalva          Heart Monitor Please connect cardiac monitor for 30 days at dc      Consults Cardiology on board PFO   Rheum/Autoimmune Labs    Hypercoag     Further work up    Further considerations        Patient Follow-up    - final recommendation pending work-up    Thank you for this consult. We will continue to follow.     The Stroke Staff is Dr. Patricia.    Kodi Egan MD  Vascular Neurology Fellow  To page me or covering stroke neurology team member, click here: AMCOM   Choose \"On Call\" tab at top, then search dropdown box for \"Neurology Adult\", select location, press Enter, then look for stroke/neuro ICU/telestroke.    _____________________________________________________    Risk Factors Present on Admission              #PFO   Past Medical History   No past medical history on file.  Past Surgical History   Past Surgical " History:   Procedure Laterality Date     APPENDECTOMY       ARTHROSCOPY KNEE Left 11/6/2015    Procedure: ARTHROSCOPY KNEE;  Surgeon: Koko Babb MD;  [facility]     ARTHROSCOPY KNEE WITH FIXATION OF OSTEOCHONDRAL DISSECANS Left 3/17/2015    Procedure: ARTHROSCOPY KNEE WITH FIXATION OSTEOCHONDRITIS DESSICANS;  Surgeon: Koko Babb MD;  [facility]     NO HISTORY OF SURGERY       ORTHOPEDIC SURGERY       REMOVE HARDWARE KNEE Left 11/6/2015    Procedure: REMOVE HARDWARE KNEE;  Surgeon: Koko Babb MD;  [facility]     Medications   Home Meds  Prior to Admission medications    Medication Sig Start Date End Date Taking? Authorizing Provider   aspirin (ASA) 81 MG EC tablet Take 1 tablet (81 mg) by mouth daily 7/9/21   Homero Yi MD   clopidogrel (PLAVIX) 75 MG tablet Take 1 tablet (75 mg) by mouth daily 7/9/21 7/9/21  Chadwick Kumar MD       Scheduled Meds    aspirin  81 mg Oral Daily     clopidogrel  75 mg Oral Daily     sodium chloride (PF)  3 mL Intracatheter Q8H       Infusion Meds    - MEDICATION INSTRUCTIONS -       - MEDICATION INSTRUCTIONS -       - MEDICATION INSTRUCTIONS -       sodium chloride 0.9%         PRN Meds  acetaminophen **OR** acetaminophen, lidocaine 4%, lidocaine (buffered or not buffered), - MEDICATION INSTRUCTIONS -, - MEDICATION INSTRUCTIONS -, - MEDICATION INSTRUCTIONS -, ondansetron **OR** ondansetron, sodium chloride (PF), sodium chloride 0.9%    Allergies   No Known Allergies  Family History   Family History   Problem Relation Age of Onset     Cancer Maternal Grandmother         liver cancer     Diabetes No family hx of      Hypertension No family hx of      Social History   Social History     Tobacco Use     Smoking status: Never Smoker     Smokeless tobacco: Never Used   Substance Use Topics     Alcohol use: Yes     Comment: occassionally     Drug use: No       Review of Systems   The 10 point Review of Systems  is negative other than noted in the HPI or here.        PHYSICAL EXAMINATION   Temp:  [97.6  F (36.4  C)-98.7  F (37.1  C)] 97.6  F (36.4  C)  Pulse:  [61-77] 70  Resp:  [16-20] 16  BP: (108-145)/(54-79) 114/67  SpO2:  [98 %-100 %] 98 %    General physical exam:    HEENT: normocephalic, eyes open with no discharge, nares patent, oropharynx is clear with no lesions, palate intact  CV: regular rate  Chest: normal configuration, no respiratory distress  Ab: soft, non-distended  Skin: no rashes or lesions    Neuro exam:    Mental status:   Awake, alert, and oriented to person, place, time and location/situation    Speech:   Normal Abnormal   Fluency X    Comprehension X    Articulation X    Repetition X    Naming X      Cranial Nerves:   Normal Abnormal   II Pupils equal (3mm), round,    Visual Fields are full to confrontation    Ill, IV, VI EOMI, no nystagmus    V Intact to LT in V1-3    VII  Mild right facial droop   VIII Hearing intact to voice    IX,X -    XI Shrug equal    XII Tongue midline        Muscle/motor:   Tone: Normal   Bulk: Normal   Fasciculations: None observed      Pronator drift: R noted without pronation    Neck Flexion    Neck Extension         Right Left  Right Left   Shoulder abductors: 5 5 Hip flexors: 5 5   Elbow flexors: 5 5 Hip abductors:     Elbow extensors: 5 5 Hip extensors:     Wrist extensors:   Hip adductors:     Wrist flexors:   Knee flexors: 5 5   Finger flexors: 5 5 Knee extensors: 5 5   Finger extensors:   Ankle plantar flexors: 5 5   Finger abductors:   Ankle dorsiflexors: 5 5   Thumb abductors:   Ankle inversion:        Ankle eversion:        Toe extensors:        Toe flexors:       No abnormal movements, rigidity or spasticity    Reflexes:     Right Left   Triceps 1 1   Biceps 1 1   Brachioradialis 1 1   Patella 1 1   Ankle 1 1   Plantar     Graham     Jaw              Sensation:   Normal  RUE LUE RLE LLE   Light Touch x             Coordination:    No gross ataxia    Gait and  Station:   deferred          Dysphagia Screen  Per Nursing      Imaging  I personally reviewed all imaging; relevant findings per HPI.    Labs Data   CBC  Recent Labs   Lab 07/10/21  0826 07/09/21  0430 07/06/21  1204   WBC 5.9 9.6 5.3   RBC 5.26 5.60 5.34   HGB 15.8 16.4 16.2   HCT 48.2 50.5 48.7    275 247     Basic Metabolic Panel   Recent Labs   Lab 07/11/21  0704 07/10/21  0826 07/09/21  0430    137 138   POTASSIUM 4.1 4.4 3.7   CHLORIDE 104 104 106   CO2 26 28 27   BUN 17 17 21   CR 1.21 1.30* 1.36*   * 105* 104*   MAYA 9.3 9.2 9.4     Liver Panel  Recent Labs   Lab 07/06/21  1204   PROTTOTAL 7.3   ALBUMIN 4.0   BILITOTAL 2.4*   ALKPHOS 83   AST 23   ALT 39     INR    Recent Labs   Lab Test 07/09/21  0430 07/07/21  1241   INR 1.00 1.05      Lipid Profile    Recent Labs   Lab Test 07/06/21  1204   CHOL 127   HDL 58   LDL 50   TRIG 93     A1C    Recent Labs   Lab Test 07/06/21  1204   A1C 5.2     Troponin I    Recent Labs   Lab 07/06/21  1746 07/06/21  1204   TROPI <0.015 <0.015          Stroke Code / Stroke Consult Data Data This was a non-emergent, non-tele stroke consult.

## 2021-07-11 NOTE — PROGRESS NOTES
[facility]    Hospitalist Progress Note    Brief Summary:  Ravinder Bardales is a 20 year old male with no significant past medical history who presents with a variety of neurological symptoms over the last 2 weeks and is admitted with acute stroke, found to have embolic stroke with unknown source and PFO, discharge on aspirin 81 mg daily with follow up with Neurology and cardiology for PFO closure now admitted back gain with right upper extremity weakness, numbness, slurred speech, blurry vision and headache.     Assessment & Plan        Recent Multiple acute ischemic strokes/ESUS with PFO   Patient presented initially with variety of neurological symptoms over the last 2 weeks [right arm numbness, right eye blurred vision, left arm numbness, loss of balance], fatigue and chest tightness.   He had a MRI of the brain with and without contrast shows multiple ischemic stroke involving left frontal lobe, left parietal lobe and left occipital lobe and left postcentral gyrus in the vicinity of the hand knob.      Likely his symptoms related to stroke, likely embolic. He had TTE done which is positive for bubble study.   He was on Aspirin 325 mg daily for now and switch to 81 mg daily on discharge. Labs work looks normal. Normal electrolytes, renal function, cbc, LFT's and Lipid panel. Hemoglobin A1c is 5.2        Now come back again with right upper extremity numbness, weakness, headache, blurry vision and slurred speech.  Repeat MRI on admission shows no acute stroke dose shows multiple small acute infarct not significantly changed.      He subsequently admitted and Neurology was consulted, previous work up include TTE, PHUONG with finding of PFO, US LE negative for DVT, MRV pelvis negative for any clots. Thrombophilia work up  Protein C and S, Prothrombin gene mutation, Factor V leiden, cardiolipin antibody, beta 2 glycoprotein IgG and IgM, antithrombin 3 and lupus anticoagulant.   Protein C and S,  GABRIEL, cardiolipin antibody, beta 2 glycoprotein IgG and IgM, antithrombin 3 come back negative, others pending at this time.      He has repeat MRI brain with and without contrast today, MRA head and Neck and CT chest abdomen and pelvis with contrast. Repeat MRI does not show new infarct, MRA remain negative, CT chest abdomen and pelvis was normal as well.   LP done on admission, show elevated WBC of 60 with 90% lymphocytes. Cause is uncertain, as CSF protein and glucose normal. Gram stain negative, culture negative so far, HSV, Cryptococcus and Meningitis/encephalitis panel negative. CRP normal.      Neurology is following and doing further work up including infectious, connective tissue and vasculitis workup negative so far.  Lyme antibody and DNA by PCR added and pending at this time. His symptoms are now improve. Plavix 75 mg daily added to aspirin 81 mg daily.   Plan is for cerebral angiogram tomorrow.       PFO  Chest tightness  EKG and troponin normal.  Echo shows EF of 55-60%, normal diastolic function, no WMA.  No significant valve disease.   No more chest pain or tightness at this time. .   He does have small PFO, Cardiology has evaluated him during his previous admission and recommended closure.         Await further recommendation by Neurology at this time.        DVT Prophylaxis: Pneumatic Compression Devices  Code Status: Full Code    Disposition: Expected discharge in 1-2 days     Homero Yi MD  Text Page  (7am - 6pm)    Interval History   Patient feeling well this morning, denies any chest pain, SOB, fever, chills, nausea, vomiting, vision problem and weakness at this time.     No other significant event overnight.     -Data reviewed today: I reviewed all new labs and imaging results over the last 24 hours. I personally reviewed no images or EKG's today.    Physical Exam   Temp: 99.1  F (37.3  C) Temp src: Oral BP: 135/67 Pulse: 93   Resp: 18 SpO2: 98 % O2 Device: None (Room air)    Vitals:     07/09/21 0430 07/09/21 1256   Weight: 81.6 kg (180 lb) 80.7 kg (177 lb 14.6 oz)     Vital Signs with Ranges  Temp:  [97.6  F (36.4  C)-99.1  F (37.3  C)] 99.1  F (37.3  C)  Pulse:  [61-93] 93  Resp:  [16-18] 18  BP: (108-135)/(54-69) 135/67  SpO2:  [98 %-100 %] 98 %  I/O last 3 completed shifts:  In: 540 [P.O.:540]  Out: -     Constitutional: awake, alert, cooperative, no apparent distress, and appears stated age  Eyes: Lids and lashes normal, pupils equal, round and reactive to light, extra ocular muscles intact, sclera clear, conjunctiva normal  Respiratory: No increased work of breathing, good air exchange, clear to auscultation bilaterally, no crackles or wheezing  Cardiovascular: Normal apical impulse, regular rate and rhythm, normal S1 and S2, no S3 or S4, and no murmur noted  GI: No scars, normal bowel sounds, soft, non-distended, non-tender, no masses palpated, no hepatosplenomegally  Musculoskeletal: no lower extremity pitting edema present  Neurologic: no focal deficit today     Medications     - MEDICATION INSTRUCTIONS -       - MEDICATION INSTRUCTIONS -       - MEDICATION INSTRUCTIONS -       sodium chloride 0.9%         aspirin  81 mg Oral Daily     clopidogrel  75 mg Oral Daily     sodium chloride (PF)  3 mL Intracatheter Q8H       Data   Recent Labs   Lab 07/11/21  0704 07/10/21  0826 07/09/21  0430 07/07/21  1241 07/06/21  1746 07/06/21  1204   WBC  --  5.9 9.6  --   --  5.3   HGB  --  15.8 16.4  --   --  16.2   MCV  --  92 90  --   --  91   PLT  --  233 275  --   --  247   INR  --   --  1.00 1.05  --   --     137 138  --   --  138   POTASSIUM 4.1 4.4 3.7  --   --  4.0   CHLORIDE 104 104 106  --   --  107   CO2 26 28 27  --   --  30   BUN 17 17 21  --   --  12   CR 1.21 1.30* 1.36*  --   --  1.25   ANIONGAP 5 5 5  --   --  1*   MAYA 9.3 9.2 9.4  --   --  9.2   * 105* 104*  --   --  102*   ALBUMIN  --   --   --   --   --  4.0   PROTTOTAL  --   --   --   --   --  7.3   BILITOTAL  --   --    --   --   --  2.4*   ALKPHOS  --   --   --   --   --  83   ALT  --   --   --   --   --  39   AST  --   --   --   --   --  23   TROPI  --   --   --   --  <0.015 <0.015       No results found for this or any previous visit (from the past 24 hour(s)).

## 2021-07-12 ENCOUNTER — ANESTHESIA EVENT (OUTPATIENT)
Dept: MEDSURG UNIT | Facility: CLINIC | Age: 21
End: 2021-07-12

## 2021-07-12 ENCOUNTER — ANESTHESIA (OUTPATIENT)
Dept: MEDSURG UNIT | Facility: CLINIC | Age: 21
End: 2021-07-12

## 2021-07-12 LAB
ALB CSF/SERPL: 3.5 RATIO (ref 0–9)
ALBUMIN CSF-MCNC: 16 MG/DL (ref 0–35)
ALBUMIN SERPL ELPH-MCNC: 4.3 G/DL (ref 3.7–5.1)
ALBUMIN SERPL-MCNC: 4574 MG/DL (ref 3500–5200)
ALPHA1 GLOB SERPL ELPH-MCNC: 0.2 G/DL (ref 0.2–0.4)
ALPHA2 GLOB SERPL ELPH-MCNC: 0.8 G/DL (ref 0.5–0.9)
ANA SER QL IF: NEGATIVE
ANCA AB PATTERN SER IF-IMP: NORMAL
B BURGDOR IGG+IGM SER QL: 0.05 (ref 0–0.89)
B-GLOBULIN SERPL ELPH-MCNC: 0.8 G/DL (ref 0.6–1)
C-ANCA TITR SER IF: NORMAL {TITER}
C3 SERPL-MCNC: 117 MG/DL (ref 81–157)
C4 SERPL-MCNC: 20 MG/DL (ref 13–39)
CCP AB SER IA-ACNC: 2 U/ML
CENTROMERE IGG SER-ACNC: <0.2 AI (ref 0–0.9)
DSDNA AB SER-ACNC: 1 IU/ML
ENA JO1 IGG SER-ACNC: <0.2 AI (ref 0–0.9)
ENA RNP IGG SER IA-ACNC: <0.2 AI (ref 0–0.9)
ENA SCL70 IGG SER IA-ACNC: <0.2 AI (ref 0–0.9)
ENA SM IGG SER-ACNC: <0.2 AI (ref 0–0.9)
ENA SS-A IGG SER IA-ACNC: <0.2 AI (ref 0–0.9)
ENA SS-B IGG SER IA-ACNC: <0.2 AI (ref 0–0.9)
GAMMA GLOB SERPL ELPH-MCNC: 0.9 G/DL (ref 0.7–1.6)
HAV IGG SER QL IA: REACTIVE
HAV IGM SERPL QL IA: NONREACTIVE
HBV CORE AB SERPL QL IA: NONREACTIVE
HBV CORE IGM SERPL QL IA: NONREACTIVE
HBV SURFACE AB SERPL IA-ACNC: 0 M[IU]/ML
HBV SURFACE AG SERPL QL IA: NONREACTIVE
HCV AB SERPL QL IA: NONREACTIVE
HIV 1+2 AB+HIV1 P24 AG SERPL QL IA: NONREACTIVE
IGA SERPL-MCNC: 333 MG/DL (ref 84–499)
IGG CSF-MCNC: 1.7 MG/DL (ref 0–6)
IGG SERPL-MCNC: 941 MG/DL (ref 610–1616)
IGG SERPL-MCNC: 969 MG/DL (ref 768–1632)
IGG SYNTH RATE SER+CSF CALC-MRATE: <0 MG/D
IGG/ALB CLEAR SER+CSF-RTO: 0.5 RATIO (ref 0.28–0.66)
IGG/ALB CSF: 0.11 RATIO (ref 0.09–0.25)
IGM SERPL-MCNC: 107 MG/DL (ref 35–242)
KAPPA LC UR-MCNC: 1.19 MG/DL (ref 0.33–1.94)
KAPPA LC/LAMBDA SER: 0.88 {RATIO} (ref 0.26–1.65)
LAMBDA LC SERPL-MCNC: 1.35 MG/DL (ref 0.57–2.63)
M PROTEIN SERPL ELPH-MCNC: 0 G/DL
MYELOPEROXIDASE AB SER-ACNC: <0.2 AI (ref 0–0.9)
OLIGOCLONAL BANDS CSF ELPH-IMP: NEGATIVE
OLIGOCLONAL BANDS CSF ELPH-IMP: NORMAL
OLIGOCLONAL BANDS CSF IEF: 0 BANDS (ref 0–1)
PROT PATTERN SERPL ELPH-IMP: NORMAL
PROT PATTERN SERPL IFE-IMP: NORMAL
PROTEINASE3 IGG SER-ACNC: <0.2 AI (ref 0–0.9)
RHEUMATOID FACT SER NEPH-ACNC: <7 IU/ML (ref 0–20)
SARS-COV-2 AB PNL SERPL IA: POSITIVE
SARS-COV-2 IGG SERPL IA-ACNC: NORMAL

## 2021-07-12 PROCEDURE — 99233 SBSQ HOSP IP/OBS HIGH 50: CPT | Mod: GC | Performed by: PSYCHIATRY & NEUROLOGY

## 2021-07-12 PROCEDURE — 250N000013 HC RX MED GY IP 250 OP 250 PS 637: Performed by: STUDENT IN AN ORGANIZED HEALTH CARE EDUCATION/TRAINING PROGRAM

## 2021-07-12 PROCEDURE — 250N000011 HC RX IP 250 OP 636: Performed by: INTERNAL MEDICINE

## 2021-07-12 PROCEDURE — 120N000001 HC R&B MED SURG/OB

## 2021-07-12 PROCEDURE — 250N000013 HC RX MED GY IP 250 OP 250 PS 637: Performed by: PSYCHIATRY & NEUROLOGY

## 2021-07-12 PROCEDURE — 99232 SBSQ HOSP IP/OBS MODERATE 35: CPT | Performed by: INTERNAL MEDICINE

## 2021-07-12 PROCEDURE — 250N000013 HC RX MED GY IP 250 OP 250 PS 637: Performed by: INTERNAL MEDICINE

## 2021-07-12 RX ORDER — KETOROLAC TROMETHAMINE 15 MG/ML
30 INJECTION, SOLUTION INTRAMUSCULAR; INTRAVENOUS ONCE
Status: COMPLETED | OUTPATIENT
Start: 2021-07-12 | End: 2021-07-12

## 2021-07-12 RX ADMIN — KETOROLAC TROMETHAMINE 30 MG: 15 INJECTION, SOLUTION INTRAMUSCULAR; INTRAVENOUS at 22:43

## 2021-07-12 RX ADMIN — IBUPROFEN 400 MG: 200 TABLET, FILM COATED ORAL at 17:55

## 2021-07-12 RX ADMIN — ASPIRIN 81 MG CHEWABLE TABLET 81 MG: 81 TABLET CHEWABLE at 08:07

## 2021-07-12 RX ADMIN — ACETAMINOPHEN 650 MG: 325 TABLET, FILM COATED ORAL at 11:08

## 2021-07-12 RX ADMIN — CLOPIDOGREL BISULFATE 75 MG: 75 TABLET ORAL at 08:07

## 2021-07-12 RX ADMIN — ACETAMINOPHEN 650 MG: 325 TABLET, FILM COATED ORAL at 04:18

## 2021-07-12 ASSESSMENT — ACTIVITIES OF DAILY LIVING (ADL)
ADLS_ACUITY_SCORE: 12

## 2021-07-12 NOTE — PROGRESS NOTES
[facility]    Hospitalist Progress Note    Brief Summary:  Ravinder Bardales is a 20 year old male with no significant past medical history who presents with a variety of neurological symptoms over the last 2 weeks and is admitted with acute stroke, found to have embolic stroke with unknown source and PFO, discharge on aspirin 81 mg daily with follow up with Neurology and cardiology for PFO closure now admitted back gain with right upper extremity weakness, numbness, slurred speech, blurry vision and headache.     Assessment & Plan        Recent Multiple acute ischemic strokes/ESUS with PFO   Patient presented initially with variety of neurological symptoms over the last 2 weeks [right arm numbness, right eye blurred vision, left arm numbness, loss of balance], fatigue and chest tightness.   He had a MRI of the brain with and without contrast shows multiple ischemic stroke involving left frontal lobe, left parietal lobe and left occipital lobe and left postcentral gyrus in the vicinity of the hand knob.      Likely his symptoms related to stroke, likely embolic. He had TTE done which is positive for bubble study.   He was on Aspirin 325 mg daily for now and switch to 81 mg daily on discharge. Labs work looks normal. Normal electrolytes, renal function, cbc, LFT's and Lipid panel. Hemoglobin A1c is 5.2        Now come back again with right upper extremity numbness, weakness, headache, blurry vision and slurred speech.  Repeat MRI on admission shows no acute stroke dose shows multiple small acute infarct not significantly changed.      He subsequently admitted and Neurology was consulted, previous work up include TTE, PHUONG with finding of PFO, US LE negative for DVT, MRV pelvis negative for any clots. Thrombophilia work up  Protein C and S, Prothrombin gene mutation, Factor V leiden, cardiolipin antibody, beta 2 glycoprotein IgG and IgM, antithrombin 3 and lupus anticoagulant.   Protein C and S,  GABRIEL, cardiolipin antibody, beta 2 glycoprotein IgG and IgM, antithrombin 3 come back negative, others pending at this time.      He has repeat MRI brain with and without contrast today, MRA head and Neck and CT chest abdomen and pelvis with contrast. Repeat MRI does not show new infarct, MRA remain negative, CT chest abdomen and pelvis was normal as well.   LP done on admission, show elevated WBC of 60 with 90% lymphocytes. Cause is uncertain, as CSF protein and glucose normal. Gram stain negative, culture negative so far, HSV, Cryptococcus and Meningitis/encephalitis panel negative. CRP normal. MPO negative, Lyme CSF serology negative.      Neurology is following and doing further work up including infectious, connective tissue and vasculitis workup negative so far.  Lyme antibody and DNA by PCR added and pending at this time. His symptoms are now improve. Plavix 75 mg daily added to aspirin 81 mg daily.   Plan is for cerebral angiogram as per neurology. Connective tissue work up pending.       PFO  Chest tightness  EKG and troponin normal.  Echo shows EF of 55-60%, normal diastolic function, no WMA.  No significant valve disease.   No more chest pain or tightness at this time. .   He does have small PFO, Cardiology has evaluated him during his previous admission and recommended closure.     Spinal Headache    positional worse on standing started after LP.   Blood patch would be the definitive treatment but as he on aspirin and Plavix anesthesia is recommending  Against it. Continue tylenol and ibuprofen for symptomatic management.      Await further recommendation by Neurology at this time.        DVT Prophylaxis: Pneumatic Compression Devices  Code Status: Full Code    Disposition: Expected discharge after angiogram     Homero iY MD  Text Page  (7am - 6pm)    Interval History   Patient main complaints today is headache since his LP worse with standing and walking, also complaint about lower back pain as well.  No numbness, tingling, vision problem or extremity weakness today.     No other significant event overnight.     -Data reviewed today: I reviewed all new labs and imaging results over the last 24 hours. I personally reviewed no images or EKG's today.    Physical Exam   Temp: 98.9  F (37.2  C) Temp src: Oral BP: 101/59 Pulse: 69   Resp: 18 SpO2: 98 % O2 Device: None (Room air)    Vitals:    07/09/21 0430 07/09/21 1256   Weight: 81.6 kg (180 lb) 80.7 kg (177 lb 14.6 oz)     Vital Signs with Ranges  Temp:  [97.9  F (36.6  C)-99.1  F (37.3  C)] 98.9  F (37.2  C)  Pulse:  [66-93] 69  Resp:  [16-18] 18  BP: (101-135)/(53-68) 101/59  SpO2:  [98 %-100 %] 98 %  No intake/output data recorded.    Constitutional: awake, alert, cooperative, no apparent distress, and appears stated age  Eyes: Lids and lashes normal, pupils equal, round and reactive to light, extra ocular muscles intact, sclera clear, conjunctiva normal  Respiratory: No increased work of breathing, good air exchange, clear to auscultation bilaterally, no crackles or wheezing  Cardiovascular: Normal apical impulse, regular rate and rhythm, normal S1 and S2, no S3 or S4, and no murmur noted  GI: No scars, normal bowel sounds, soft, non-distended, non-tender, no masses palpated, no hepatosplenomegally  Musculoskeletal: no lower extremity pitting edema present  Neurologic: no focal deficit today     Medications     - MEDICATION INSTRUCTIONS -       - MEDICATION INSTRUCTIONS -       sodium chloride 0.9%         aspirin  81 mg Oral Daily     clopidogrel  75 mg Oral Daily     sodium chloride (PF)  3 mL Intracatheter Q8H       Data   Recent Labs   Lab 07/11/21  0704 07/10/21  0826 07/09/21  0430 07/07/21  1241 07/06/21  1746 07/06/21  1204   WBC  --  5.9 9.6  --   --  5.3   HGB  --  15.8 16.4  --   --  16.2   MCV  --  92 90  --   --  91   PLT  --  233 275  --   --  247   INR  --   --  1.00 1.05  --   --     137 138  --   --  138   POTASSIUM 4.1 4.4 3.7  --   --   4.0   CHLORIDE 104 104 106  --   --  107   CO2 26 28 27  --   --  30   BUN 17 17 21  --   --  12   CR 1.21 1.30* 1.36*  --   --  1.25   ANIONGAP 5 5 5  --   --  1*   MAYA 9.3 9.2 9.4  --   --  9.2   * 105* 104*  --   --  102*   ALBUMIN  --   --   --   --   --  4.0   PROTTOTAL  --   --   --   --   --  7.3   BILITOTAL  --   --   --   --   --  2.4*   ALKPHOS  --   --   --   --   --  83   ALT  --   --   --   --   --  39   AST  --   --   --   --   --  23   TROPI  --   --   --   --  <0.015 <0.015       No results found for this or any previous visit (from the past 24 hour(s)).

## 2021-07-12 NOTE — PLAN OF CARE
Pt here with L MCA CVA. A&Ox4. Neuros intact, except HA. VSS. Tele NSR. NPO for procedure. Takes pills whole with water. Up independently. Lower back pain taking tylenol and ibuprofen. Voiding adequately. Pt scoring green on the Aggression Stop Light Tool. Plan for cerebral angiogram today.

## 2021-07-12 NOTE — CONSULTS
Thanks for the consult. Consulted for doing a blood patch for post dural puncture headache on 20 y.o male admitted with recurrent strokes.  Patient is anticoagulated and was given plavix this morning.   Epidural blood patch is contraindicated at this time. It is not recommended while the patient is anticoagulated. It is recommended that an epidural should be done at a minimum of 7 days after stopping plavix.  Would suggest conservative management with tylenol and ibuprofen (if not contraindicated) for the headache, caffeine and hydration for the headache.    Simón Sanchez MD

## 2021-07-12 NOTE — PLAN OF CARE
Pt here with RUE weakness and aphasia. A&O times 4. Neuro's intact. VSS. Tele SBD. C/o headache, improves with flat bedrest. Tylenol given. Per IR patient can have blood patch when off of Plavix for 7 days. Independent. Plan for cerebral angiogram tomorrow. Pt scoring green on the Aggression Stop Light Tool. Parents at bedside.

## 2021-07-12 NOTE — PROGRESS NOTES
[facility]    Stroke Consult Note    Subjective:  Patient experiencing HA with some positional component. Concern for CSF leak.     HPI  Ravinder Bardales is a 20 year old male with past medical history of previous knee surgery.  Patient presents after a series of neurologic complaints which started Saturday.  He states symptoms first started while he was Valsalva while using the restroom and started with an electric-like sensation that went down the right arm leading to several minutes of arm weakness and and more prolonged numbness.  This was followed by word finding difficulty where he was able to understand everyone he just was unable to get the words out and had stuttering expressive speech.  This was followed by right eye vision loss described as dark coming from the superior vision and extending to the lower vision and resolving over a period of minutes leaving some blurry vision before fully resolving.  The timing of the symptoms lasted approximately 20 minutes total with some prolonged numbness in the right arm.  A day later Sunday 7/4 patient was noted to have a left arm numbness and weakness which resolved.  Monday 7/5 patient was riding his motorcycle and he had a repeat of vision loss similar in semiology as above.    Patient is representing after he was discharged 7/8/2021 he went home and hung out with some friends in the evening and fell asleep around 10 PM and when he woke up he had right arm numbness, weakness, word finding difficulty.        Stroke Summary and Impression  Acute ischemic stroke of L MCA due to cardioembolism  Patient of such a young age with PFO found on echocardiogram likely secondary to embolism/cardioembolism/paradoxical embolism.    Further consideration for multiple strokes and waxing and waning symptoms would be for infectious cause or inflammatory/vasculitis.        Assessment   CT Head Late presentation, not performed   MRI   MRI  7/6/21      MRI 7/9/21:  Seems like expected evolution of previous seen infarcts.     MRI 7/10:  No further stroke seen   Head Vessel Imaging  no significant stenosis   Neck Vessel Imaging  no significant stenosis   Cardiac TTE:   The visual ejection fraction is 55-60%.  The right ventricle is normal in size and function.  A contrast injection (Bubble Study) was performed that was mildly positive for  flow across the interatrial septum.  No significant valve disease.  PHUONG:  There is a small patent foramen ovale (PFO) present. There is mild bi-  directional shunting present, with Left to Right shunting on color Doppler,  and Right to Left shunting with a mildly positive bubble study with the  Valsalva maneuver only. Bubble study was negative at rest. Right atrial aspect  defect measures 0.1 cm. Left atrial aspect defect measures 0.1 cm. Tunnel  length measures 1.2 cm. Aortic valve rim (aortic short-axis) measures 0.9 cm.  AV valve rim (4-chamber) measures 1.1 cm. SVC rim (bi-caval) measures 1.3 cm.  Interatrial septum is not aneurysmal, there are no fenestrations.  Sweep image obtained (labeled SWEEP).     Left ventricular size, global systolic function, and wall motion are normal,  estimated LVEF 55-60%.  Right ventricular global function is normal.  No significant valvular abnormalities.  The left atrial appendage was well visualized and free of thrombus.   EKG Sinus   Blood Pressure Goal: < 140/80   Antiplt/Anticoag     LDL               Lab Results   Component Value Date/Time     LDL 50 07/06/2021 12:04 PM       A1C               Lab Results   Component Value Date/Time     A1C 5.2 07/06/2021 12:04 PM       Troponin               Lab Results   Component Value Date/Time     TROPI <0.015 07/06/2021 05:46 PM     TROPI <0.015 07/06/2021 12:04 PM        MRV pelvis and doppler unremarkable   CT Chest abdom pelvis 1.  No abnormal masses or lymphadenopathy in the chest, abdomen, or  pelvis.     2.  Vasculature appears  normal.   CSF studies CSF results:  Lab Results   Component Value Date    CRBC  07/09/2021     Test not performed. Criteria not met for second cell count.    CRBC 0 07/09/2021    CWBC  07/09/2021     Test not performed. Criteria not met for second cell count.    CWBC 60 (H) 07/09/2021    CTP 35 07/09/2021    CGLU 58 07/09/2021     Lab Results   Component Value Date/Time    HSCSF1 Not Detected 07/09/2021 02:32 PM    HSCSF2 Not Detected 07/09/2021 02:32 PM     Lab Results   Component Value Date/Time    MEK1 Negative 07/09/2021 02:32 PM    MEHI Negative 07/09/2021 02:32 PM    MELIST Negative 07/09/2021 02:32 PM    MEMEN Negative 07/09/2021 02:32 PM    MEGBS Negative 07/09/2021 02:32 PM    MESPN Negative 07/09/2021 02:32 PM    MECMV Negative 07/09/2021 02:32 PM    MEENT Negative 07/09/2021 02:32 PM    MEHS1 Negative 07/09/2021 02:32 PM    MEHS2 Negative 07/09/2021 02:32 PM    MEHV6 Negative 07/09/2021 02:32 PM    MEPAR Negative 07/09/2021 02:32 PM    MEVZV Negative 07/09/2021 02:32 PM    MECRP Negative 07/09/2021 02:32 PM    MECOM  07/09/2021 02:32 PM     Assay performed using the FDA-cleared FilmArray ME Panel from [employer]     VZV: Negative  Crypto: Negative  Lyme: IgG Pending  B Burgdorferi: Negative  Lyme disease LUKAS: IGG IGM Negative  Lyme disease DNA: Pending  VDRL Negative  OCB: WNL  EBV: Pending  CMV: Pending  Fungus Culture: pending  CSF Culture: pending  Gram stain: no organisms           Autoimmune labs Lab Results   Component Value Date/Time    CRP <2.9 07/10/2021 08:26 AM    SED 4 07/10/2021 08:26 AM     TSH   Date Value Ref Range Status   07/10/2021 1.36 0.40 - 4.00 mU/L Final     GABRIEL: Negative  RF: WNL  ANCA IgG: Pending  CCP: Pending  Double-stranded DNA: Pending  Antinuclear antibody: Pending  Myeloperoxidase and proteinase 3: WNL  Rheumatoid factor: Pending  Protein immunofixation serum: Pending  HIV: Nonreactive  Hepatitis B: Nonreactive  Hepatitis A: IgG - Reactive; IgM -  "Non-reactive  Hepatitis C: non-reactive  Hepatitis D: Pending  Hepatitis C pending  SSA/SSB: Pending  Smith antibody: Pending  RNP antibody: Pending  Elidia 1 antibody: Pending  SCL 70: Pending  Centromere antibody: Pending  Cryoglobulin identification: Pending  Protein electrophoresis: WNL  Kappa lambda light chain: WNL  C3/C4: 117/20  Lactic Acid: Negative  CK: 58  Covid Abhinav ab: pending  Covid PCR: Negative  Resp Panel: Negative  Lyme disease: Pending  Influenza: Negative     Hyper coag labs Labs Pending  - Homocysteine: Pending  - Prot C: WNL  - Prot S: WNL  - Lupus AC: Negative  - Factor 5, 2, Prothrombin  - Anticardiolipin: WNL  - Beta 2 glycoprotein: WNL  - ATIII: WNL         PLAN     Further Imaging  Cerebral Angio on Tuesday noon; NPO midnight (ordered)   Blood Pressure Goal: < 140/80   Antiplt/Anticoag   Continue Asa 81mg qday    Plavix 75mg qday    LDL      Goal < 70    At goal   A1C Defer to primary team, goal A1c < 7   Tobacco Recommend tobacco cessation if applicable   Rehab Physical Therapy   Occupational Therapy    Speech Therapy    Secondary Stroke Prevention ? Alcohol consumption: men< or= 2 drinks per day, nonpregnant women< or= 1 drink per day  ? Physical activity: at least 30 minutes of moderate intensity exercise 1-3 times per week  ? Diet: low fat, low sodium, Mediterranean diet  ? Goal BMI 18.5-25   ? Gradually increase activity and limit exertion as PFO shunt increased with valsalva          Heart Monitor Please connect cardiac monitor for 30 days at dc      Consults Cardiology on board PFO   Rheum/Autoimmune Labs    Hypercoag     Further work up    Further considerations        Patient Follow-up    - final recommendation pending work-up    Thank you for this consult. We will continue to follow.     The Stroke Staff is Dr. Patricia.    Kodi Egan MD  Vascular Neurology Fellow  To page me or covering stroke neurology team member, click here: AMCOM   Choose \"On Call\" tab at top, then search " "dropdown box for \"Neurology Adult\", select location, press Enter, then look for stroke/neuro ICU/telestroke.    _____________________________________________________    Risk Factors Present on Admission              #PFO   Past Medical History   No past medical history on file.  Past Surgical History   Past Surgical History:   Procedure Laterality Date     APPENDECTOMY       ARTHROSCOPY KNEE Left 11/6/2015    Procedure: ARTHROSCOPY KNEE;  Surgeon: Koko Babb MD;  [facility]     ARTHROSCOPY KNEE WITH FIXATION OF OSTEOCHONDRAL DISSECANS Left 3/17/2015    Procedure: ARTHROSCOPY KNEE WITH FIXATION OSTEOCHONDRITIS DESSICANS;  Surgeon: Koko Babb MD;  [facility]     NO HISTORY OF SURGERY       ORTHOPEDIC SURGERY       REMOVE HARDWARE KNEE Left 11/6/2015    Procedure: REMOVE HARDWARE KNEE;  Surgeon: Koko Babb MD;  [facility]     Medications   Home Meds  Prior to Admission medications    Medication Sig Start Date End Date Taking? Authorizing Provider   aspirin (ASA) 81 MG EC tablet Take 1 tablet (81 mg) by mouth daily 7/9/21   Homero Yi MD   clopidogrel (PLAVIX) 75 MG tablet Take 1 tablet (75 mg) by mouth daily 7/9/21 7/9/21  Chadwcik Kumar MD       Scheduled Meds    aspirin  81 mg Oral Daily     clopidogrel  75 mg Oral Daily     sodium chloride (PF)  3 mL Intracatheter Q8H       Infusion Meds    - MEDICATION INSTRUCTIONS -       - MEDICATION INSTRUCTIONS -       sodium chloride 0.9%         PRN Meds  acetaminophen **OR** acetaminophen, ibuprofen, lidocaine 4%, lidocaine (buffered or not buffered), - MEDICATION INSTRUCTIONS -, - MEDICATION INSTRUCTIONS -, ondansetron **OR** ondansetron, sodium chloride (PF), sodium chloride 0.9%    Allergies   No Known Allergies  Family History   Family History   Problem Relation Age of Onset     Cancer Maternal Grandmother         liver cancer     Diabetes No family hx of      Hypertension No family hx of  "     Social History   Social History     Tobacco Use     Smoking status: Never Smoker     Smokeless tobacco: Never Used   Substance Use Topics     Alcohol use: Yes     Comment: occassionally     Drug use: No       Review of Systems   The 10 point Review of Systems is negative other than noted in the HPI or here.        PHYSICAL EXAMINATION   Temp:  [97.9  F (36.6  C)-99.1  F (37.3  C)] 97.9  F (36.6  C)  Pulse:  [66-93] 66  Resp:  [16-18] 16  BP: (105-135)/(53-68) 117/64  SpO2:  [98 %-100 %] 100 %    General Exam  General:  patient lying in bed without any acute distress    HEENT:  normocephalic/atraumatic  Cardio:  regular rate  Pulmonary:  no respiratory distress  Abdomen:  non-distended  Extremities:  no edema  Skin:  intact     Neuro Exam  Mental Status:  follows commands  Cranial Nerves:  PERRL, EOMI with normal smooth pursuit, facial movements symmetric, hearing not formally tested but intact to conversation, no dysarthria  Motor:  normal muscle tone and bulk, no abnormal movements, able to move all limbs spontaneously  Sensory:  light touch sensation intact and symmetric throughout upper and lower extremities  Coordination:  no ataxia or tremor observed  Station/Gait:  deferred          Dysphagia Screen  Per Nursing      Imaging  I personally reviewed all imaging; relevant findings per HPI.    Labs Data   CBC  Recent Labs   Lab 07/10/21  0826 07/09/21  0430 07/06/21  1204   WBC 5.9 9.6 5.3   RBC 5.26 5.60 5.34   HGB 15.8 16.4 16.2   HCT 48.2 50.5 48.7    275 247     Basic Metabolic Panel   Recent Labs   Lab 07/11/21  0704 07/10/21  0826 07/09/21  0430    137 138   POTASSIUM 4.1 4.4 3.7   CHLORIDE 104 104 106   CO2 26 28 27   BUN 17 17 21   CR 1.21 1.30* 1.36*   * 105* 104*   MAYA 9.3 9.2 9.4     Liver Panel  Recent Labs   Lab 07/06/21  1204   PROTTOTAL 7.3   ALBUMIN 4.0   BILITOTAL 2.4*   ALKPHOS 83   AST 23   ALT 39     INR    Recent Labs   Lab Test 07/09/21  0430 07/07/21  1241   INR 1.00  1.05      Lipid Profile    Recent Labs   Lab Test 07/06/21  1204   CHOL 127   HDL 58   LDL 50   TRIG 93     A1C    Recent Labs   Lab Test 07/06/21  1204   A1C 5.2     Troponin I    Recent Labs   Lab 07/06/21  1746 07/06/21  1204   TROPI <0.015 <0.015          Stroke Code / Stroke Consult Data Data This was a non-emergent, non-tele stroke consult.

## 2021-07-12 NOTE — CONSULTS
Care Management Initial Consult    General Information  Assessment completed with: Patient,    Type of CM/SW Visit: Offer D/C Planning    Primary Care Provider verified and updated as needed: Yes   Readmission within the last 30 days: current reason for admission unrelated to previous admission   Return Category: Exacerbation of disease  Reason for Consult: discharge planning  Advance Care Planning:            Communication Assessment  Patient's communication style: spoken language (English or Bilingual)    Hearing Difficulty or Deaf: no   Wear Glasses or Blind: no    Cognitive  Cognitive/Neuro/Behavioral: WDL  Level of Consciousness: alert  Arousal Level: opens eyes spontaneously  Orientation: oriented x 4  Mood/Behavior: calm, cooperative  Best Language: 0 - No aphasia  Speech: clear, spontaneous    Living Environment:   People in home: parent(s)     Current living Arrangements:        Able to return to prior arrangements:         Family/Social Support:  Care provided by: self  Provides care for: no one  Marital Status: Single             Description of Support System:           Current Resources:   Patient receiving home care services: No     Community Resources: None  Equipment currently used at home: none  Supplies currently used at home:      Employment/Financial:  Employment Status:          Financial Concerns: No concerns identified           Lifestyle & Psychosocial Needs:  Social Determinants of Health     Tobacco Use: Low Risk      Smoking Tobacco Use: Never Smoker     Smokeless Tobacco Use: Never Used   Alcohol Use:      Frequency of Alcohol Consumption:      Average Number of Drinks:      Frequency of Binge Drinking:    Financial Resource Strain:      Difficulty of Paying Living Expenses:    Food Insecurity:      Worried About Running Out of Food in the Last Year:      Ran Out of Food in the Last Year:    Transportation Needs:      Lack of Transportation (Medical):      Lack of Transportation  (Non-Medical):    Physical Activity:      Days of Exercise per Week:      Minutes of Exercise per Session:    Stress:      Feeling of Stress :    Social Connections:      Frequency of Communication with Friends and Family:      Frequency of Social Gatherings with Friends and Family:      Attends [employer] Services:      Active Member of Clubs or Organizations:      Attends Club or Organization Meetings:      Marital Status:    Intimate Partner Violence:      Fear of Current or Ex-Partner:      Emotionally Abused:      Physically Abused:      Sexually Abused:    Depression: Not at risk     PHQ-2 Score: 0   Housing Stability:      Unable to Pay for Housing in the Last Year:      Number of Places Lived in the Last Year:      Unstable Housing in the Last Year:        Functional Status:  Prior to admission patient needed assistance:              Mental Health Status:  Mental Health Status: No Current Concerns       Chemical Dependency Status:  Chemical Dependency Status: No Current Concerns             Values/Beliefs:  Spiritual, Cultural Beliefs, [employer] Practices, Values that affect care:                 Additional Information:  Met with patient to explain role in discharge planning.  Pt recently discharge with similar issue.  Pt voices no needs for discharge.   Following appointments were already arranged from last admit:    Jul 19, 2021  3:40 PM   Office Visit with All Morales MD   [facility] [address] [address]   [phone]   Bring a current list of meds and any records pertaining to this visit.  For Physicals, please bring immunization records and any forms needing to be filled out. Please arrive 10 minutes early to complete paperwork.   Aug 11, 2021  8:30 AM   (Arrive by 8:15 AM)   Return Stroke with Aba Patricia MD   [facility] [place] ([facility] ) [address] [address] [phone]     CC to follow if other needs arise.     Betzy Crain RN

## 2021-07-13 ENCOUNTER — APPOINTMENT (OUTPATIENT)
Dept: INTERVENTIONAL RADIOLOGY/VASCULAR | Facility: CLINIC | Age: 21
DRG: 065 | End: 2021-07-13
Payer: COMMERCIAL

## 2021-07-13 LAB
DIAGNOSTIC IMP SPEC-IMP: NOT DETECTED
EBV DNA # SPEC NAA+PROBE: <390 CPY/ML
EBV DNA SPEC NAA+PROBE-LOG#: <2.6 LOG
HEV IGG SER QL: NEGATIVE
HEV IGM SER QL: NEGATIVE
SPECIMEN SOURCE: NORMAL

## 2021-07-13 PROCEDURE — C1769 GUIDE WIRE: HCPCS

## 2021-07-13 PROCEDURE — C1887 CATHETER, GUIDING: HCPCS

## 2021-07-13 PROCEDURE — 99153 MOD SED SAME PHYS/QHP EA: CPT

## 2021-07-13 PROCEDURE — 36224 PLACE CATH CAROTD ART: CPT | Mod: XS | Performed by: RADIOLOGY

## 2021-07-13 PROCEDURE — 272N000192 HC ACCESSORY CR2

## 2021-07-13 PROCEDURE — 76937 US GUIDE VASCULAR ACCESS: CPT | Mod: 26 | Performed by: RADIOLOGY

## 2021-07-13 PROCEDURE — 250N000011 HC RX IP 250 OP 636: Performed by: INTERNAL MEDICINE

## 2021-07-13 PROCEDURE — 272N000567 HC SHEATH CR4

## 2021-07-13 PROCEDURE — 250N000013 HC RX MED GY IP 250 OP 250 PS 637: Performed by: STUDENT IN AN ORGANIZED HEALTH CARE EDUCATION/TRAINING PROGRAM

## 2021-07-13 PROCEDURE — 272N000196 HC ACCESSORY CR5

## 2021-07-13 PROCEDURE — 99233 SBSQ HOSP IP/OBS HIGH 50: CPT | Performed by: INTERNAL MEDICINE

## 2021-07-13 PROCEDURE — 250N000013 HC RX MED GY IP 250 OP 250 PS 637: Performed by: PSYCHIATRY & NEUROLOGY

## 2021-07-13 PROCEDURE — 250N000011 HC RX IP 250 OP 636: Performed by: PSYCHIATRY & NEUROLOGY

## 2021-07-13 PROCEDURE — 99233 SBSQ HOSP IP/OBS HIGH 50: CPT | Mod: GC | Performed by: PSYCHIATRY & NEUROLOGY

## 2021-07-13 PROCEDURE — 250N000013 HC RX MED GY IP 250 OP 250 PS 637: Performed by: INTERNAL MEDICINE

## 2021-07-13 PROCEDURE — 36226 PLACE CATH VERTEBRAL ART: CPT | Mod: RT | Performed by: RADIOLOGY

## 2021-07-13 PROCEDURE — 120N000001 HC R&B MED SURG/OB

## 2021-07-13 PROCEDURE — 258N000003 HC RX IP 258 OP 636: Performed by: STUDENT IN AN ORGANIZED HEALTH CARE EDUCATION/TRAINING PROGRAM

## 2021-07-13 PROCEDURE — 250N000009 HC RX 250: Performed by: PSYCHIATRY & NEUROLOGY

## 2021-07-13 PROCEDURE — 99152 MOD SED SAME PHYS/QHP 5/>YRS: CPT

## 2021-07-13 PROCEDURE — 99152 MOD SED SAME PHYS/QHP 5/>YRS: CPT | Mod: GC | Performed by: RADIOLOGY

## 2021-07-13 PROCEDURE — 32561 LYSE CHEST FIBRIN INIT DAY: CPT

## 2021-07-13 PROCEDURE — 255N000002 HC RX 255 OP 636: Performed by: INTERNAL MEDICINE

## 2021-07-13 RX ORDER — SODIUM CHLORIDE 9 MG/ML
INJECTION, SOLUTION INTRAVENOUS CONTINUOUS
Status: DISCONTINUED | OUTPATIENT
Start: 2021-07-13 | End: 2021-07-14 | Stop reason: HOSPADM

## 2021-07-13 RX ORDER — FLUMAZENIL 0.1 MG/ML
0.2 INJECTION, SOLUTION INTRAVENOUS
Status: DISCONTINUED | OUTPATIENT
Start: 2021-07-13 | End: 2021-07-13

## 2021-07-13 RX ORDER — PROCHLORPERAZINE MALEATE 10 MG
10 TABLET ORAL EVERY 6 HOURS PRN
Status: DISCONTINUED | OUTPATIENT
Start: 2021-07-13 | End: 2021-07-14 | Stop reason: HOSPADM

## 2021-07-13 RX ORDER — KETOROLAC TROMETHAMINE 15 MG/ML
15 INJECTION, SOLUTION INTRAMUSCULAR; INTRAVENOUS ONCE
Status: COMPLETED | OUTPATIENT
Start: 2021-07-13 | End: 2021-07-13

## 2021-07-13 RX ORDER — HEPARIN SODIUM 200 [USP'U]/100ML
1 INJECTION, SOLUTION INTRAVENOUS CONTINUOUS PRN
Status: DISCONTINUED | OUTPATIENT
Start: 2021-07-13 | End: 2021-07-13

## 2021-07-13 RX ORDER — ONDANSETRON 2 MG/ML
4 INJECTION INTRAMUSCULAR; INTRAVENOUS EVERY 6 HOURS PRN
Status: DISCONTINUED | OUTPATIENT
Start: 2021-07-13 | End: 2021-07-14 | Stop reason: HOSPADM

## 2021-07-13 RX ORDER — OXYCODONE HYDROCHLORIDE 5 MG/1
5 TABLET ORAL ONCE
Status: COMPLETED | OUTPATIENT
Start: 2021-07-13 | End: 2021-07-13

## 2021-07-13 RX ORDER — NALOXONE HYDROCHLORIDE 0.4 MG/ML
0.2 INJECTION, SOLUTION INTRAMUSCULAR; INTRAVENOUS; SUBCUTANEOUS
Status: DISCONTINUED | OUTPATIENT
Start: 2021-07-13 | End: 2021-07-13

## 2021-07-13 RX ORDER — NALOXONE HYDROCHLORIDE 0.4 MG/ML
0.4 INJECTION, SOLUTION INTRAMUSCULAR; INTRAVENOUS; SUBCUTANEOUS
Status: DISCONTINUED | OUTPATIENT
Start: 2021-07-13 | End: 2021-07-13

## 2021-07-13 RX ORDER — FENTANYL CITRATE 50 UG/ML
25-50 INJECTION, SOLUTION INTRAMUSCULAR; INTRAVENOUS EVERY 5 MIN PRN
Status: DISCONTINUED | OUTPATIENT
Start: 2021-07-13 | End: 2021-07-13

## 2021-07-13 RX ORDER — CYCLOBENZAPRINE HCL 10 MG
10 TABLET ORAL 3 TIMES DAILY
Status: DISCONTINUED | OUTPATIENT
Start: 2021-07-13 | End: 2021-07-14 | Stop reason: HOSPADM

## 2021-07-13 RX ORDER — PROCHLORPERAZINE 25 MG
25 SUPPOSITORY, RECTAL RECTAL EVERY 12 HOURS PRN
Status: DISCONTINUED | OUTPATIENT
Start: 2021-07-13 | End: 2021-07-14 | Stop reason: HOSPADM

## 2021-07-13 RX ORDER — LIDOCAINE 40 MG/G
CREAM TOPICAL
Status: DISCONTINUED | OUTPATIENT
Start: 2021-07-13 | End: 2021-07-13

## 2021-07-13 RX ORDER — FLUMAZENIL 0.1 MG/ML
0.2 INJECTION, SOLUTION INTRAVENOUS
Status: DISCONTINUED | OUTPATIENT
Start: 2021-07-13 | End: 2021-07-14

## 2021-07-13 RX ORDER — ONDANSETRON 4 MG/1
4 TABLET, ORALLY DISINTEGRATING ORAL EVERY 6 HOURS PRN
Status: DISCONTINUED | OUTPATIENT
Start: 2021-07-13 | End: 2021-07-14 | Stop reason: HOSPADM

## 2021-07-13 RX ORDER — NALOXONE HYDROCHLORIDE 0.4 MG/ML
0.2 INJECTION, SOLUTION INTRAMUSCULAR; INTRAVENOUS; SUBCUTANEOUS
Status: DISCONTINUED | OUTPATIENT
Start: 2021-07-13 | End: 2021-07-14 | Stop reason: HOSPADM

## 2021-07-13 RX ORDER — IOPAMIDOL 612 MG/ML
100 INJECTION, SOLUTION INTRAVASCULAR ONCE
Status: COMPLETED | OUTPATIENT
Start: 2021-07-13 | End: 2021-07-13

## 2021-07-13 RX ORDER — OXYCODONE HYDROCHLORIDE 5 MG/1
5 TABLET ORAL EVERY 4 HOURS PRN
Status: DISCONTINUED | OUTPATIENT
Start: 2021-07-13 | End: 2021-07-14 | Stop reason: HOSPADM

## 2021-07-13 RX ORDER — NALOXONE HYDROCHLORIDE 0.4 MG/ML
0.4 INJECTION, SOLUTION INTRAMUSCULAR; INTRAVENOUS; SUBCUTANEOUS
Status: DISCONTINUED | OUTPATIENT
Start: 2021-07-13 | End: 2021-07-14 | Stop reason: HOSPADM

## 2021-07-13 RX ORDER — SODIUM CHLORIDE 9 MG/ML
INJECTION, SOLUTION INTRAVENOUS CONTINUOUS
Status: DISCONTINUED | OUTPATIENT
Start: 2021-07-13 | End: 2021-07-13

## 2021-07-13 RX ADMIN — MIDAZOLAM HYDROCHLORIDE 0.5 MG: 1 INJECTION, SOLUTION INTRAMUSCULAR; INTRAVENOUS at 13:08

## 2021-07-13 RX ADMIN — FENTANYL CITRATE 25 MCG: 50 INJECTION INTRAMUSCULAR; INTRAVENOUS at 13:09

## 2021-07-13 RX ADMIN — LIDOCAINE HYDROCHLORIDE 2 ML: 10 INJECTION, SOLUTION INFILTRATION; PERINEURAL at 12:58

## 2021-07-13 RX ADMIN — MIDAZOLAM HYDROCHLORIDE 1 MG: 1 INJECTION, SOLUTION INTRAMUSCULAR; INTRAVENOUS at 12:55

## 2021-07-13 RX ADMIN — FENTANYL CITRATE 50 MCG: 50 INJECTION INTRAMUSCULAR; INTRAVENOUS at 12:51

## 2021-07-13 RX ADMIN — MIDAZOLAM HYDROCHLORIDE 1 MG: 1 INJECTION, SOLUTION INTRAMUSCULAR; INTRAVENOUS at 12:50

## 2021-07-13 RX ADMIN — OXYCODONE HYDROCHLORIDE 5 MG: 5 TABLET ORAL at 10:53

## 2021-07-13 RX ADMIN — OXYCODONE HYDROCHLORIDE 5 MG: 5 TABLET ORAL at 21:00

## 2021-07-13 RX ADMIN — ASPIRIN 81 MG CHEWABLE TABLET 81 MG: 81 TABLET CHEWABLE at 08:10

## 2021-07-13 RX ADMIN — SODIUM CHLORIDE: 9 INJECTION, SOLUTION INTRAVENOUS at 14:07

## 2021-07-13 RX ADMIN — CYCLOBENZAPRINE 10 MG: 10 TABLET, FILM COATED ORAL at 10:53

## 2021-07-13 RX ADMIN — IOPAMIDOL 25 ML: 612 INJECTION, SOLUTION INTRAVENOUS at 13:20

## 2021-07-13 RX ADMIN — KETOROLAC TROMETHAMINE 15 MG: 15 INJECTION, SOLUTION INTRAMUSCULAR; INTRAVENOUS at 10:53

## 2021-07-13 RX ADMIN — CYCLOBENZAPRINE 10 MG: 10 TABLET, FILM COATED ORAL at 22:13

## 2021-07-13 RX ADMIN — CYCLOBENZAPRINE 10 MG: 10 TABLET, FILM COATED ORAL at 15:44

## 2021-07-13 RX ADMIN — FENTANYL CITRATE 50 MCG: 50 INJECTION INTRAMUSCULAR; INTRAVENOUS at 12:55

## 2021-07-13 RX ADMIN — CLOPIDOGREL BISULFATE 75 MG: 75 TABLET ORAL at 08:10

## 2021-07-13 ASSESSMENT — ACTIVITIES OF DAILY LIVING (ADL)
ADLS_ACUITY_SCORE: 12

## 2021-07-13 NOTE — IR NOTE
Interventional Radiology Intra-procedural Nursing Note    Patient Name: Ravinder Bardales  Medical Record Number: [id number]  Today's Date: July 13, 2021    Start Time: 1257  End of procedure time: 1315  Procedure: carotid/cerebral angiogram  Report given to: BAL Schmidt, [facility]  Time pt departs:  1335    Other Notes:   Versed 2.5mg IV  Fentanyl 125mcg IV  Lidocaine 1% 2ml     Patient tolerated procedure well. VSS. Patient alert, respirations regular and unlabored, no c/o pain at this time.     Procedure access site right groin is c/d/i, 5f sheath to right femoral artery, removed at 1315, manual pressure held for 15 minutes, site is intact with no hematoma or bleeding. Covered with tegaderm.    Patient transferred back to [facility] in stable condition accompanied by myself, Radiology staff      Lin Rush RN  Interventional Radiology

## 2021-07-13 NOTE — PLAN OF CARE
Pt here following temporary vision loss, word finding difficulty, expressive aphasia, RUE weakness. Pt not noted to have any neuro deficits during assessments, RUE weakness resolved.  A&O x 4. Neuros intermittent HA, given PRN ibuprofen which was effective.  Back spasms from lumbar puncture, given hot packs.  Ineffective at HS, order obtained for one-time tramadol via IV. VSS. Tele SD. Regular diet, thin liquids. Takes pills whole with water. Up independent in room, steady gait.  Pt scoring green on the Aggression Stop Light Tool. Plan for cerebro angiogram tomorrow morning, to be NPO after midnight.  Discharge pending.

## 2021-07-13 NOTE — PROGRESS NOTES
[facility]    Stroke Consult Note    Subjective:  Pt with continued Headache with positional component. Attempting to get blood patch, holding plavix.     HPI  Ravinder Bardales is a 20 year old male with past medical history of previous knee surgery.  Patient presents after a series of neurologic complaints which started Saturday.  He states symptoms first started while he was Valsalva while using the restroom and started with an electric-like sensation that went down the right arm leading to several minutes of arm weakness and and more prolonged numbness.  This was followed by word finding difficulty where he was able to understand everyone he just was unable to get the words out and had stuttering expressive speech.  This was followed by right eye vision loss described as dark coming from the superior vision and extending to the lower vision and resolving over a period of minutes leaving some blurry vision before fully resolving.  The timing of the symptoms lasted approximately 20 minutes total with some prolonged numbness in the right arm.  A day later Sunday 7/4 patient was noted to have a left arm numbness and weakness which resolved.  Monday 7/5 patient was riding his motorcycle and he had a repeat of vision loss similar in semiology as above.    Patient is representing after he was discharged 7/8/2021 he went home and hung out with some friends in the evening and fell asleep around 10 PM and when he woke up he had right arm numbness, weakness, word finding difficulty.        Stroke Summary and Impression  Acute ischemic stroke of L MCA due to cardioembolism  Patient of such a young age with PFO found on echocardiogram likely secondary to embolism/cardioembolism/paradoxical embolism.    Further consideration for multiple strokes and waxing and waning symptoms would be for infectious cause or inflammatory/vasculitis.        Assessment   CT Head Late presentation, not performed    MRI   MRI 7/6/21      MRI 7/9/21:  Seems like expected evolution of previous seen infarcts.     MRI 7/10:  No further stroke seen   Head Vessel Imaging  no significant stenosis   Neck Vessel Imaging  no significant stenosis   Cardiac TTE:   The visual ejection fraction is 55-60%.  The right ventricle is normal in size and function.  A contrast injection (Bubble Study) was performed that was mildly positive for  flow across the interatrial septum.  No significant valve disease.  PHUONG:  There is a small patent foramen ovale (PFO) present. There is mild bi-  directional shunting present, with Left to Right shunting on color Doppler,  and Right to Left shunting with a mildly positive bubble study with the  Valsalva maneuver only. Bubble study was negative at rest. Right atrial aspect  defect measures 0.1 cm. Left atrial aspect defect measures 0.1 cm. Tunnel  length measures 1.2 cm. Aortic valve rim (aortic short-axis) measures 0.9 cm.  AV valve rim (4-chamber) measures 1.1 cm. SVC rim (bi-caval) measures 1.3 cm.  Interatrial septum is not aneurysmal, there are no fenestrations.  Sweep image obtained (labeled SWEEP).     Left ventricular size, global systolic function, and wall motion are normal,  estimated LVEF 55-60%.  Right ventricular global function is normal.  No significant valvular abnormalities.  The left atrial appendage was well visualized and free of thrombus.   EKG Sinus   Blood Pressure Goal: < 140/80   Antiplt/Anticoag     LDL               Lab Results   Component Value Date/Time     LDL 50 07/06/2021 12:04 PM       A1C               Lab Results   Component Value Date/Time     A1C 5.2 07/06/2021 12:04 PM       Troponin               Lab Results   Component Value Date/Time     TROPI <0.015 07/06/2021 05:46 PM     TROPI <0.015 07/06/2021 12:04 PM        MRV pelvis and doppler unremarkable   CT Chest abdom pelvis 1.  No abnormal masses or lymphadenopathy in the chest, abdomen, or  pelvis.     2.  Vasculature  appears normal.   CSF studies CSF results:  Lab Results   Component Value Date    CRBC  07/09/2021     Test not performed. Criteria not met for second cell count.    CRBC 0 07/09/2021    CWBC  07/09/2021     Test not performed. Criteria not met for second cell count.    CWBC 60 (H) 07/09/2021    CTP 35 07/09/2021    CGLU 58 07/09/2021     Lab Results   Component Value Date/Time    HSCSF1 Not Detected 07/09/2021 02:32 PM    HSCSF2 Not Detected 07/09/2021 02:32 PM     Lab Results   Component Value Date/Time    MEK1 Negative 07/09/2021 02:32 PM    MEHI Negative 07/09/2021 02:32 PM    MELIST Negative 07/09/2021 02:32 PM    MEMEN Negative 07/09/2021 02:32 PM    MEGBS Negative 07/09/2021 02:32 PM    MESPN Negative 07/09/2021 02:32 PM    MECMV Negative 07/09/2021 02:32 PM    MEENT Negative 07/09/2021 02:32 PM    MEHS1 Negative 07/09/2021 02:32 PM    MEHS2 Negative 07/09/2021 02:32 PM    MEHV6 Negative 07/09/2021 02:32 PM    MEPAR Negative 07/09/2021 02:32 PM    MEVZV Negative 07/09/2021 02:32 PM    MECRP Negative 07/09/2021 02:32 PM    MECOM  07/09/2021 02:32 PM     Assay performed using the FDA-cleared FilmArray ME Panel from [employer]     VZV: Negative  Crypto: Negative  Lyme: IgG Pending  B Burgdorferi: Negative  Lyme disease LUKAS: IGG IGM Negative  Lyme disease DNA: Pending  VDRL Negative  OCB: WNL  EBV: Pending  CMV: Pending  Fungus Culture: pending  CSF Culture: pending  Gram stain: no organisms           Autoimmune labs Lab Results   Component Value Date/Time    CRP <2.9 07/10/2021 08:26 AM    SED 4 07/10/2021 08:26 AM     TSH   Date Value Ref Range Status   07/10/2021 1.36 0.40 - 4.00 mU/L Final     GABRIEL: Negative  RF: WNL  ANCA IgG: Pending  CCP: Pending  Double-stranded DNA: Pending  Antinuclear antibody: Pending  Myeloperoxidase and proteinase 3: WNL  Rheumatoid factor: Pending  Protein immunofixation serum: Pending  HIV: Nonreactive  Hepatitis B: Nonreactive  Hepatitis A: IgG - Reactive; IgM -  Non-reactive  Hepatitis C: non-reactive  Hepatitis D: Pending  Hepatitis C pending  SSA/SSB: Pending  Smith antibody: Pending  RNP antibody: Pending  Elidia 1 antibody: Pending  SCL 70: Pending  Centromere antibody: Pending  Cryoglobulin identification: Pending  Protein electrophoresis: WNL  Kappa lambda light chain: WNL  C3/C4: 117/20  Lactic Acid: Negative  CK: 58  Covid Abhinav ab: pending  Covid PCR: Negative  Resp Panel: Negative  Lyme disease: Pending  Influenza: Negative     Hyper coag labs Labs Pending  - Homocysteine: Pending  - Prot C: WNL  - Prot S: WNL  - Lupus AC: Negative  - Factor 5, 2, Prothrombin  - Anticardiolipin: WNL  - Beta 2 glycoprotein: WNL  - ATIII: WNL         PLAN     Further Imaging  Cerebral Angio on Tuesday noon; NPO midnight (ordered)  > If symptoms of positional headache persist, blood patch  > If cerebral angiogram normal may consider discharge unless headache is prohibitive   Blood Pressure Goal: < 140/80   Antiplt/Anticoag   Continue Asa 81mg qday    Plavix 75mg qday (holding in setting of CSF leak headache and possible blood patch)   LDL      Goal < 70    At goal   A1C Defer to primary team, goal A1c < 7   Tobacco Recommend tobacco cessation if applicable   Rehab Physical Therapy   Occupational Therapy    Speech Therapy    Secondary Stroke Prevention ? Alcohol consumption: men< or= 2 drinks per day, nonpregnant women< or= 1 drink per day  ? Physical activity: at least 30 minutes of moderate intensity exercise 1-3 times per week  ? Diet: low fat, low sodium, Mediterranean diet  ? Goal BMI 18.5-25   ? Gradually increase activity and limit exertion as PFO shunt increased with valsalva          Heart Monitor Please connect cardiac monitor for 30 days at [facility]      Consults Cardiology on board PFO   Rheum/Autoimmune Labs    Hypercoag     Further work up    Further considerations        Patient Follow-up    - final recommendation pending work-up    Thank you for this consult. We will continue to  "follow.     The Stroke Staff is Dr. Patricia.    Kodi Egan MD  Vascular Neurology Fellow  To page me or covering stroke neurology team member, click here: [email or web address]   Choose \"On Call\" tab at top, then search dropdown box for \"Neurology Adult\", select location, press Enter, then look for stroke/neuro ICU/telestroke.    _____________________________________________________    Risk Factors Present on Admission              #PFO   Past Medical History   No past medical history on file.  Past Surgical History   Past Surgical History:   Procedure Laterality Date     APPENDECTOMY       ARTHROSCOPY KNEE Left 11/6/2015    Procedure: ARTHROSCOPY KNEE;  Surgeon: Koko Babb MD;  [facility]: [facility]     ARTHROSCOPY KNEE WITH FIXATION OF OSTEOCHONDRAL DISSECANS Left 3/17/2015    Procedure: ARTHROSCOPY KNEE WITH FIXATION OSTEOCHONDRITIS DESSICANS;  Surgeon: Koko Babb MD;  [facility]     NO HISTORY OF SURGERY       ORTHOPEDIC SURGERY       REMOVE HARDWARE KNEE Left 11/6/2015    Procedure: REMOVE HARDWARE KNEE;  Surgeon: Koko Babb MD;  [facility]     Medications   Home Meds  Prior to Admission medications    Medication Sig Start Date End Date Taking? Authorizing Provider   aspirin (ASA) 81 MG EC tablet Take 1 tablet (81 mg) by mouth daily 7/9/21   Homero Yi MD   clopidogrel (PLAVIX) 75 MG tablet Take 1 tablet (75 mg) by mouth daily 7/9/21 7/9/21  Chadwick Kumar MD       Scheduled Meds    aspirin  81 mg Oral Daily     clopidogrel  75 mg Oral Daily     sodium chloride (PF)  3 mL Intracatheter Q8H       Infusion Meds    - MEDICATION INSTRUCTIONS -       - MEDICATION INSTRUCTIONS -       sodium chloride 0.9%         PRN Meds  acetaminophen **OR** acetaminophen, ibuprofen, lidocaine 4%, lidocaine (buffered or not buffered), - MEDICATION INSTRUCTIONS -, - MEDICATION INSTRUCTIONS -, ondansetron **OR** ondansetron, sodium chloride (PF), sodium chloride " 0.9%    Allergies   No Known Allergies  Family History   Family History   Problem Relation Age of Onset     Cancer Maternal Grandmother         liver cancer     Diabetes No family hx of      Hypertension No family hx of      Social History   Social History     Tobacco Use     Smoking status: Never Smoker     Smokeless tobacco: Never Used   Substance Use Topics     Alcohol use: Yes     Comment: occassionally     Drug use: No       Review of Systems   The 10 point Review of Systems is negative other than noted in the HPI or here.        PHYSICAL EXAMINATION   Temp:  [97.8  F (36.6  C)-98.9  F (37.2  C)] 97.8  F (36.6  C)  Pulse:  [60-82] 62  Resp:  [18] 18  BP: (101-140)/(58-69) 119/62  SpO2:  [98 %-100 %] 99 %    General Exam  General:  patient lying in bed without any acute distress    HEENT:  normocephalic/atraumatic  Cardio:  regular rate  Pulmonary:  no respiratory distress  Abdomen:  non-distended  Extremities:  no edema  Skin:  intact     Neuro Exam  Mental Status:  follows commands  Cranial Nerves:  PERRL, EOMI with normal smooth pursuit, facial movements symmetric, hearing not formally tested but intact to conversation, no dysarthria  Motor:  normal muscle tone and bulk, no abnormal movements, able to move all limbs spontaneously  Sensory:  light touch sensation intact and symmetric throughout upper and lower extremities  Coordination:  no ataxia or tremor observed  Station/Gait:  deferred          Dysphagia Screen  Per Nursing      Imaging  I personally reviewed all imaging; relevant findings per HPI.    Labs Data   CBC  Recent Labs   Lab 07/10/21  0826 07/09/21  0430 07/06/21  1204   WBC 5.9 9.6 5.3   RBC 5.26 5.60 5.34   HGB 15.8 16.4 16.2   HCT 48.2 50.5 48.7    275 247     Basic Metabolic Panel   Recent Labs   Lab 07/11/21  0704 07/10/21  0826 07/09/21  0430    137 138   POTASSIUM 4.1 4.4 3.7   CHLORIDE 104 104 106   CO2 26 28 27   BUN 17 17 21   CR 1.21 1.30* 1.36*   * 105* 104*    MAYA 9.3 9.2 9.4     Liver Panel  Recent Labs   Lab 07/06/21  1204   PROTTOTAL 7.3   ALBUMIN 4.0   BILITOTAL 2.4*   ALKPHOS 83   AST 23   ALT 39     INR    Recent Labs   Lab Test 07/09/21  0430 07/07/21  1241   INR 1.00 1.05      Lipid Profile    Recent Labs   Lab Test 07/06/21  1204   CHOL 127   HDL 58   LDL 50   TRIG 93     A1C    Recent Labs   Lab Test 07/06/21  1204   A1C 5.2     Troponin I    Recent Labs   Lab 07/06/21  1746 07/06/21  1204   TROPI <0.015 <0.015          Stroke Code / Stroke Consult Data Data This was a non-emergent, non-tele stroke consult.

## 2021-07-13 NOTE — PLAN OF CARE
Pt here with CVI's. A&O times 4. Neuro's intact. VSS. Tele NSR. NPO, cerebral angiogram this afternoon. Independent in room, 6 hrs bedrest after cerebral angiogram, starting at 1400. IVF infusing, to be stopped at 22:00 when adequate PO intake. Right groin site with transparant dressing, CDI, no swelling.

## 2021-07-13 NOTE — PROGRESS NOTES
[facility]    Hospitalist Progress Note    Brief Summary:  Ravinder Bardales is a 20 year old male with no significant past medical history who presents with a variety of neurological symptoms over the last 2 weeks and is admitted with acute stroke, found to have embolic stroke with unknown source and PFO, discharge on aspirin 81 mg daily with follow up with Neurology and cardiology for PFO closure now admitted back gain with right upper extremity weakness, numbness, slurred speech, blurry vision and headache.     Assessment & Plan        Recent Multiple acute ischemic strokes/ESUS with PFO   Patient presented initially with variety of neurological symptoms over the last 2 weeks [right arm numbness, right eye blurred vision, left arm numbness, loss of balance], fatigue and chest tightness.   He had a MRI of the brain with and without contrast shows multiple ischemic stroke involving left frontal lobe, left parietal lobe and left occipital lobe and left postcentral gyrus in the vicinity of the hand knob.      Likely his symptoms related to stroke, likely embolic. He had TTE done which is positive for bubble study.   He was on Aspirin 325 mg daily for now and switch to 81 mg daily on discharge. Labs work looks normal. Normal electrolytes, renal function, cbc, LFT's and Lipid panel. Hemoglobin A1c is 5.2        Now come back again with right upper extremity numbness, weakness, headache, blurry vision and slurred speech.  Repeat MRI on admission shows no acute stroke dose shows multiple small acute infarct not significantly changed.      He subsequently admitted and Neurology was consulted, previous work up include TTE, PHUONG with finding of PFO, US LE negative for DVT, MRV pelvis negative for any clots. Thrombophilia work up  Protein C and S, Prothrombin gene mutation, Factor V leiden, cardiolipin antibody, beta 2 glycoprotein IgG and IgM, antithrombin 3 and lupus anticoagulant.   Protein C and S,  GABRIEL, cardiolipin antibody, beta 2 glycoprotein IgG and IgM, antithrombin 3 come back negative, others pending at this time.      He has repeat MRI brain with and without contrast today, MRA head and Neck and CT chest abdomen and pelvis with contrast. Repeat MRI does not show new infarct, MRA remain negative, CT chest abdomen and pelvis was normal as well.   LP done on admission, show elevated WBC of 60 with 90% lymphocytes. Cause is uncertain, as CSF protein and glucose normal. Gram stain negative, culture negative so far, HSV, Cryptococcus and Meningitis/encephalitis panel negative. CRP normal. MPO negative, Lyme CSF serology negative.      Neurology is following and doing further work up including infectious, connective tissue and vasculitis workup negative so far.  Lyme antibody and DNA by PCR added and pending at this time. His symptoms are now improve. Plavix 75 mg daily added to aspirin 81 mg daily.   Cerebral angiogram done today is negative for any abnormality or Vasculitis at this time, need to be on bed rest for 6 hrs.         PFO  Chest tightness  EKG and troponin normal.  Echo shows EF of 55-60%, normal diastolic function, no WMA.  No significant valve disease.   No more chest pain or tightness at this time. .   He does have small PFO, Cardiology has evaluated him during his previous admission and recommended closure.     Spinal Headache    positional worse on standing started after LP.   Blood patch would be the definitive treatment but as he on aspirin and Plavix anesthesia is recommending  Against it.   Uncontrol headache, blood patch not an option because of Plavix and aspirin. Give him dose of IV Toradol 15 mg X 1 now and start on Oxycodone for pain control.   He is already on ibuprofen and tylenol without control of pain.  He does has significant pain to his lower back and bilateral hip since LP, no obvious tenderness. Likely muscle spasm, will start him on Flexeril at this time.           DVT  Prophylaxis: Pneumatic Compression Devices  Code Status: Full Code    Disposition: Expected discharge after angiogram     Homero Yi MD  Text Page  (7am - 6pm)    Interval History   Patient complaints of 9/10 headache at this time, he is also having lower back pain as well. No nausea vomiting, vision problem, numbness or tingling as this time.     No other significant event overnight.     -Data reviewed today: I reviewed all new labs and imaging results over the last 24 hours. I personally reviewed no images or EKG's today.    Physical Exam   Temp: 98  F (36.7  C) Temp src: Oral BP: 114/57 Pulse: 61   Resp: 12 SpO2: 99 % O2 Device: None (Room air)    Vitals:    07/09/21 0430 07/09/21 1256   Weight: 81.6 kg (180 lb) 80.7 kg (177 lb 14.6 oz)     Vital Signs with Ranges  Temp:  [97.8  F (36.6  C)-98.3  F (36.8  C)] 98  F (36.7  C)  Pulse:  [60-82] 61  Resp:  [7-18] 12  BP: (103-161)/(55-82) 114/57  SpO2:  [97 %-100 %] 99 %  No intake/output data recorded.    Constitutional: awake, alert, cooperative, no apparent distress, and appears stated age  Eyes: Lids and lashes normal, pupils equal, round and reactive to light, extra ocular muscles intact, sclera clear, conjunctiva normal  Respiratory: No increased work of breathing, good air exchange, clear to auscultation bilaterally, no crackles or wheezing  Cardiovascular: Normal apical impulse, regular rate and rhythm, normal S1 and S2, no S3 or S4, and no murmur noted  GI: No scars, normal bowel sounds, soft, non-distended, non-tender, no masses palpated, no hepatosplenomegally  Musculoskeletal: no lower extremity pitting edema present  Neurologic: no focal deficit today     Medications     - MEDICATION INSTRUCTIONS -       - MEDICATION INSTRUCTIONS -       sodium chloride 75 mL/hr at 07/13/21 1407       aspirin  81 mg Oral Daily     cyclobenzaprine  10 mg Oral TID     midazolam  2 mg Intravenous Once within 24 hrs     sodium chloride (PF)  3 mL Intracatheter Q8H        Data   Recent Labs   Lab 07/11/21  0704 07/10/21  0826 07/09/21  0430 07/07/21  1241 07/06/21  1746   WBC  --  5.9 9.6  --   --    HGB  --  15.8 16.4  --   --    MCV  --  92 90  --   --    PLT  --  233 275  --   --    INR  --   --  1.00 1.05  --     137 138  --   --    POTASSIUM 4.1 4.4 3.7  --   --    CHLORIDE 104 104 106  --   --    CO2 26 28 27  --   --    BUN 17 17 21  --   --    CR 1.21 1.30* 1.36*  --   --    ANIONGAP 5 5 5  --   --    MAYA 9.3 9.2 9.4  --   --    * 105* 104*  --   --    TROPI  --   --   --   --  <0.015       Recent Results (from the past 24 hour(s))   IR Carotid Cerebral Angiogram Bilateral    Narrative    PALMER RAO  [id number]  2000    History: 20 year old male with?no significant past medical history  admitted with recurrent embolic appearing ischemic infarcts in the  left cerebral hemisphere with a unremarkable stroke work-up to date  (small PFO, with bi directional shunting with valsalva) found to have  lymphocytic leukocytosis for which endovascular surgical  neuroradiology team was consulted to perform a diagnostic cerebral  angiogram to evaluate for suspected CNS vasculitis.    Indication for the procedure:  Angiographic evaluation of cerebral  vasculitis    : Dr Mistry   [place]: James Meza MD  Anesthesia: Local anesthesia and conscious sedation  Contrast used: 25 cc of VISI 320  Fluoro time: 6.1 minutes, 855 mGy  Sedation time: 25 minutes  Sedatives: Midazolam 2.5 mg, Fentanyl 125 mcg  Other medications: None    Procedure:  1.  Diagnostic cerebral angiography and interpretation of the images.  2.  Ultrasound guided right common femoral arteriotomy with permanent  image of the anatomy stored in the electronic medical record.  3.  Selective catheterization and diagnostic cerebral angiography of  the right vertebral artery, right internal carotid artery and left  internal carotid artery  4.  Percutaneous closure of right femoral arteriotomy  using manual  pressure.    Consent: The risks, benefits of a conventional diagnostic cerebral  angiography were discussed with the patient who agreed to proceed. The  risks, which were discussed included risk of stroke, arterial  dissection, groin hematoma, arteriovenous fistula in the groin and  pseudoaneurysm of the femoral artery. Subsequently, verbal and written  informed consent was obtained.    Technique: The patient was brought to the angiography suite and placed  in supine position. The medications were administered by the radiology  nursing staff. The nursing staff independently monitored the patient's  vital signs during the procedure.    The patient 's right groin was prepped and draped in standard fashion.  The right common femoral artery was palpated. Ultrasound was used to  image the common femoral artery. The femoral artery was shown to be  patent. Lidocaine was injected locally and a 4 Thai micropuncture  sheath was passed over a 21 gauge micropuncture needle into the right  common femoral artery, which was then exchanged for a 5 Thai sheath  over a J-wire. The sheath was connected to a continuous flush of  heparinized saline. A hard copy was stored in the patient's record.    A 5 Thai Angeled Taper diagnostic catheter catheter was advanced  through the femoral sheath into the abdominal and thoracic aorta over  a 0.035 inch diameter Terumo glidewire. Double flush technique was  used throughout the procedure.      Findings:    Right vertebral artery: Cranial view  Under fluoroscopic guidance and using roadmap technique, the catheter  was advanced over the guidewire into the proximal right vertebral  artery. Biplane angiography was performed over the cranium.  Intracranial view of the right vertebral artery in the AP, lateral abd  oblique projections demonstrates a normal right vertebral artery. The  right posterior inferior cerebellar artery originates from the distal  right vertebral artery.  The basilar artery is patent and bifurcates  into bilateral posterior cerebral arteries. The bilateral anterior  inferior cerebellar arteries and the bilateral superior cerebellar  arteries are normal. The capillary and venous phases are normal.    Right internal carotid artery injection: Cranial view   Under fluoroscopic guidance and roadmap technique, the catheter was  advanced over the glidewire into the right internal carotid artery.  Biplane angiography was performed over the cranium. Cranial view of  the right internal carotid artery injection in the AP, lateral and  oblique projections demonstrates normal cervical, petrous, laceral,  cavernous, clinoid, ophthalmic, and communicating segments of the  right internal carotid artery. The right internal carotid artery  bifurcates into the right anterior cerebral artery and right middle  cerebral artery. The proximal segments and distal branches of the  right anterior cerebral artery and right middle cerebral artery are  normal. The capillary and venous phases are normal.        Left internal carotid artery injection: Cranial view   Under fluoroscopic guidance and roadmap technique the catheter was  advanced over the glidewire into the left internal carotid artery.  Biplane angiography was performed over the cranium. Intracranial view  of the left internal carotid artery injection in the AP, lateral and  oblique projections demonstrates normal cervical, petrous, laceral,  cavernous, clinoidal, ophthalmic, and communicating segments of the  left internal carotid artery. The left internal carotid artery  bifurcates into the left anterior cerebral artery and left middle  cerebral artery. The proximal segments and distal branches of the left  anterior cerebral artery and left middle cerebral artery are normal.  The left posterior communicating artery is visualized. The capillary  and venous phases are normal.      Upon completion of the procedure the 5 Panamanian sheath  was removed.  Hemostasis was obtained with 20 minutes of manual compression.  Procedure was completed without any complications. Patient was then  transferred to the floor in stable condition.    Dr Mistry was present for the entire procedure.      Impression    Impression:  1.  No cerebral angiogram. Specifically, no evidence of beading,  ectasia, or segmental areas of stenosis/dilatation in the small  medium-sized cerebral arteries to suggest cerebral vasculitis.       James Meza MD   Endovascular Surgical Neuroradiology Fellow  Pager: [phone]

## 2021-07-13 NOTE — PROGRESS NOTES
[facility]     Endovascular Surgical Neuroradiology Pre-Procedure Note      HPI:  Ravinder Bardales is a 20 year old male with no significant past medical history admitted with recurrent embolic appearing ischemic infarcts in the left cerebral hemisphere with a unremarkable stroke work-up to date found to have lymphocytic leukocytosis for which endovascular surgical neuroradiology team was consulted to perform a diagnostic cerebral angiogram to evaluate for suspected CNS vasculitis.    Medical History:  No past medical history on file.    Surgical History:  Past Surgical History:   Procedure Laterality Date     APPENDECTOMY       ARTHROSCOPY KNEE Left 11/6/2015    Procedure: ARTHROSCOPY KNEE;  Surgeon: Koko Babb MD;  [facility]: [facility]     ARTHROSCOPY KNEE WITH FIXATION OF OSTEOCHONDRAL DISSECANS Left 3/17/2015    Procedure: ARTHROSCOPY KNEE WITH FIXATION OSTEOCHONDRITIS DESSICANS;  Surgeon: Koko Babb MD;  Location: [facility]     NO HISTORY OF SURGERY       ORTHOPEDIC SURGERY       REMOVE HARDWARE KNEE Left 11/6/2015    Procedure: REMOVE HARDWARE KNEE;  Surgeon: Koko Babb MD;  [facility]: [facility]       Family History:  Family History   Problem Relation Age of Onset     Cancer Maternal Grandmother         liver cancer     Diabetes No family hx of      Hypertension No family hx of        Social History:  Social History     Socioeconomic History     Marital status: Single     Spouse name: Not on file     Number of children: Not on file     Years of education: Not on file     Highest education level: Not on file   Occupational History     Not on file   Tobacco Use     Smoking status: Never Smoker     Smokeless tobacco: Never Used   Substance and Sexual Activity     Alcohol use: Yes     Comment: occassionally     Drug use: No     Sexual activity: Not Currently     Partners: Female     Birth control/protection: Pill, Condom   Other Topics Concern      Parent/sibling w/ CABG, MI or angioplasty before 65F 55M? Not Asked   Social History Narrative     Not on file     Social Determinants of Health     Financial Resource Strain:      Difficulty of Paying Living Expenses:    Food Insecurity:      Worried About Running Out of Food in the Last Year:      Ran Out of Food in the Last Year:    Transportation Needs:      Lack of Transportation (Medical):      Lack of Transportation (Non-Medical):    Physical Activity:      Days of Exercise per Week:      Minutes of Exercise per Session:    Stress:      Feeling of Stress :    Social Connections:      Frequency of Communication with Friends and Family:      Frequency of Social Gatherings with Friends and Family:      Attends [employer] Services:      Active Member of Clubs or Organizations:      Attends Club or Organization Meetings:      Marital Status:    Intimate Partner Violence:      Fear of Current or Ex-Partner:      Emotionally Abused:      Physically Abused:      Sexually Abused:        Allergies:  No Known Allergies    Is there a contrast allergy?  No    Medications:  Current Facility-Administered Medications   Medication     acetaminophen (TYLENOL) tablet 650 mg    Or     acetaminophen (TYLENOL) Suppository 650 mg     aspirin (ASA) chewable tablet 81 mg     cyclobenzaprine (FLEXERIL) tablet 10 mg     flumazenil (ROMAZICON) injection 0.2 mg     ibuprofen (ADVIL/MOTRIN) tablet 400 mg     lidocaine (LMX4) cream     lidocaine 1 % 0.1-1 mL     medication instruction     Medication Instructions - Avoid dextrose in IV solutions.     midazolam (VERSED) injection 2 mg    Followed by     midazolam (VERSED) injection 1 mg     naloxone (NARCAN) injection 0.2 mg    Or     naloxone (NARCAN) injection 0.4 mg    Or     naloxone (NARCAN) injection 0.2 mg    Or     naloxone (NARCAN) injection 0.4 mg     naloxone (NARCAN) injection 0.2 mg     naloxone (NARCAN) injection 0.2 mg     naloxone (NARCAN) injection 0.4 mg     naloxone  (NARCAN) injection 0.4 mg     ondansetron (ZOFRAN-ODT) ODT tab 4 mg    Or     ondansetron (ZOFRAN) injection 4 mg     oxyCODONE (ROXICODONE) tablet 5 mg     sodium chloride (PF) 0.9% PF flush 3 mL     sodium chloride (PF) 0.9% PF flush 3 mL     sodium chloride 0.9 % bag TABLE SOLN   .    ROS:  The 10 point Review of Systems is negative other than noted in the HPI or here.     PHYSICAL EXAMINATION  Vital Signs:  B/P: 125/66,  T: 98,  P: 61,  R: 18    General: sitting in bed in no acute distress.Abdomen is soft and NT/ND. Mood is good, affect appropriate.    Mental status: Awake, alert and oriented to person, place, time and situation.     CN:  EOMI. VFF. No field cut. Face is symmetric. Facial sensation is intact to touch in all 3 divisions bilaterally. Palate elevates symmetrically. Phonation is normal. Head turning and shoulder shrug are intact. Tongue is midline with normal movements and no atrophy.    Motor:   5/5 in all 4 limbs    Sensory:  Intact to touch bilaterally symmetrical.    Cerebellar:  No ataxia on FNT.     Gait:   Deferred      Pre-procedure National Institutes of Health Stroke Scale:   Not applicable    LABS  (most recent Cr, BUN, GFR, PLT, INR, PTT within the past 7 days):  Recent Labs   Lab 07/11/21  0704 07/10/21  0826 07/09/21  0430   CR 1.21 1.30* 1.36*   BUN 17 17 21   GFRESTIMATED 85 78 73   GFRESTBLACK >90 90 85   PLT  --  233 275   INR  --   --  1.00   PTT  --   --  26        Platelet Function P2Y12 (PRU):  Not applicable      ASSESSMENT:  Proceed with diagnostic cerebral angiogram    PLAN:  Diagnostic cerebral angiogram     PRE-PROCEDURE SEDATION ASSESSMENT     Pre-Procedure Sedation Assessment done at 1220    Expected Level:  Moderate Sedation    Indication:  Sedation is required to allow for neurointerventional procedure.    Consent obtained from patient after discussing the risks, benefits and alternatives.     PO Intake:  Appropriately NPO for procedure    ASA Class:  Class 1 - HEALTHY  PATIENT    Mallampati:  Grade 2:  Soft palate, base of uvula, tonsillar pillars, and portion of posterior pharyngeal wall visible    History and physical reviewed and no updates needed. I have reviewed the lab findings, diagnostic data, medications, and the plan for sedation. I have determined this patient to be an appropriate candidate for the planned sedation and procedure and have reassessed the patient IMMEDIATELY PRIOR to sedation and procedure.    Patient was discussed with the Attending, Dr. Mistry, who agrees with the plan.    TAMIKO LAMA MD   Pager: [phone]

## 2021-07-13 NOTE — PLAN OF CARE
Reason for admission: Admitted on 07/06/2021 for multiple neurological smptoms (right/left arm numbness, blurred vision in the right eye, loss of balance), fatigue and chest tightness. Found to have multiple ischemic stroke involving multiple areas of the brain.    Date/Time: July 13, 2021 1:50 AM   Cognitive/Mentation: A&Ox4  Neuros/CMS: Intact. S/S have resolved.  VS: B/P: 140/61, T: 98.3, P: 60, R: 18     Cardiovascular/Telemetry: SB heart rate in the low 50's  Respiratory:LS clear bilaterally    GI:BS+, Flatus-,no BM this shift. Continent  : Ambulates to the bathroom, adequate urinary output. Continient  Pain: Denies pain    Drains: PIV-SL  Skin: Intact. Lumbar puncture site on  Left side of back, YUDY.  Activity: Up independent  Diet: Tolerating a regular diet. Thin liquids. Takes pills whole. Has been NPO since midnight.  Discharge: Pending pt progress and PFO repair.    History: No significant past medical history.       End of shift summary: Remained stable throughout shift. Having a Cerbro-angio today.

## 2021-07-13 NOTE — BRIEF OP NOTE
Pre-operative diagnosis: CVA  Post procedure diagnosis: CVA    Procedure: Cerebral angiogram.    Physicians: Dr. Mistry (attending) and Dr. Meza (fellow).    Total fluoroscopy time: 6.1 mins    Total contrast dose: 25 ml.    Procedure note and findings: Normal cerebral angiogram, no suggestion of vasculitis.    Plan: Return to floor. RLE straight for 6 hours

## 2021-07-14 ENCOUNTER — TELEPHONE (OUTPATIENT)
Dept: CARE COORDINATION | Facility: CLINIC | Age: 21
End: 2021-07-14

## 2021-07-14 VITALS
BODY MASS INDEX: 25.47 KG/M2 | HEART RATE: 61 BPM | SYSTOLIC BLOOD PRESSURE: 129 MMHG | OXYGEN SATURATION: 100 % | DIASTOLIC BLOOD PRESSURE: 65 MMHG | TEMPERATURE: 98.4 F | RESPIRATION RATE: 16 BRPM | HEIGHT: 70 IN | WEIGHT: 177.91 LBS

## 2021-07-14 LAB
B BURGDOR DNA SPEC QL NAA+PROBE: NOT DETECTED
BACTERIA SPEC CULT: NO GROWTH
COPATH REPORT: NORMAL
CREAT UR-MCNC: 74 MG/DL
HCYS SERPL-SCNC: 7.3 UMOL/L (ref 4–12)
MISCELLANEOUS TEST: NORMAL
RPT COMMENT: NORMAL
SPECIMEN SOURCE: NORMAL
SPECIMEN SOURCE: NORMAL

## 2021-07-14 PROCEDURE — 250N000013 HC RX MED GY IP 250 OP 250 PS 637: Performed by: PSYCHIATRY & NEUROLOGY

## 2021-07-14 PROCEDURE — 250N000013 HC RX MED GY IP 250 OP 250 PS 637: Performed by: INTERNAL MEDICINE

## 2021-07-14 PROCEDURE — 99239 HOSP IP/OBS DSCHRG MGMT >30: CPT | Performed by: HOSPITALIST

## 2021-07-14 RX ORDER — ACETAMINOPHEN 325 MG/1
650 TABLET ORAL EVERY 4 HOURS PRN
COMMUNITY
Start: 2021-07-14 | End: 2021-08-24

## 2021-07-14 RX ORDER — CYCLOBENZAPRINE HCL 10 MG
10 TABLET ORAL 3 TIMES DAILY PRN
Qty: 15 TABLET | Refills: 0 | Status: SHIPPED | OUTPATIENT
Start: 2021-07-14 | End: 2021-08-11

## 2021-07-14 RX ORDER — IBUPROFEN 400 MG/1
400 TABLET, FILM COATED ORAL EVERY 6 HOURS PRN
COMMUNITY
Start: 2021-07-14 | End: 2021-08-11

## 2021-07-14 RX ORDER — CLOPIDOGREL BISULFATE 75 MG/1
75 TABLET ORAL DAILY
Qty: 30 TABLET | Refills: 0 | Status: SHIPPED | OUTPATIENT
Start: 2021-07-18 | End: 2021-08-11

## 2021-07-14 RX ADMIN — CYCLOBENZAPRINE 10 MG: 10 TABLET, FILM COATED ORAL at 08:46

## 2021-07-14 RX ADMIN — IBUPROFEN 400 MG: 200 TABLET, FILM COATED ORAL at 00:47

## 2021-07-14 RX ADMIN — IBUPROFEN 400 MG: 200 TABLET, FILM COATED ORAL at 12:08

## 2021-07-14 RX ADMIN — ASPIRIN 81 MG CHEWABLE TABLET 81 MG: 81 TABLET CHEWABLE at 08:46

## 2021-07-14 ASSESSMENT — ACTIVITIES OF DAILY LIVING (ADL)
ADLS_ACUITY_SCORE: 12

## 2021-07-14 NOTE — PROGRESS NOTES
[facility]    Stroke Progress Note    Interval Events  No acute events overnight, some improvement post LP ha but still present, radiology does not wish to perform blood patch 2/2 Plavix therapy    HPI Summary  19yo man with dysarthria, RUE weakness and numbness, found to have LMCA infarct, currently cryptogenic, PFO on echo, hypercoaguable workup pending.     Stroke Evaluation Summarized    MRI/Head CT 1. Multiple small acute/subacute infarcts scattered throughout the  left cerebral hemisphere.  2. No new infarcts are identified.   Intracranial Vascular Imaging Head CTA normal   Cervical Carotid and Vertebral Artery Vascular Imagineg Neck CTA normal     Echocardiogram PFO, otherwise normal   EKG/Telemetry Nonspecific sinus dysrythmia   Other Testing Angio negative     LDL   (most recent in past 7 days) No lab value available in past 7 days   A1C  (most recent in past 7 days) No lab value available in past 7 days   Troponin  (most recent in past 7 days) No lab value available in past 7 days       Impression   Acute ischemic stroke of LMCA due to undetermined etiology differential includes paradoxical embolism 2/2 PFO vs hypercoagulable state due to yet undetermined factors. Patient can be discharged with follow up by neurology.    Plan  -Discharge with stroke neurology follow-up Dr. Hannon  -Follow-up MRI brain w/wo contrast 1 mo  -No driving 3 mo, o/p driving eval  -If persistent headaches op blood patch  -Continue Aspirin 81mg every day  -Long term cardiac monitoring for arrythmia  -Follow-up remaining hypercoag panel op        Patient Follow-up    - in 4-6 weeks with Dr. Hannon for neurocardiology clinic. RN care coordinator will contact patient to schedule.    No further stroke evaluation is recommended, so we will sign off. Please contact us with any additional questions.    The Stroke Staff is Dr. Bolaños.    Rigoberto De La O MD  Vascular Neurology Fellow  To page me or covering  "stroke neurology team member, click here: [facility]   Choose \"On Call\" tab at top, then search dropdown box for \"Neurology Adult\", select location, press Enter, then look for stroke/neuro ICU/telestroke.    ______________________________________________________    Risk Factors Present on Admission                  Medications   Scheduled Meds    aspirin  81 mg Oral Daily     cyclobenzaprine  10 mg Oral TID     sodium chloride (PF)  3 mL Intracatheter Q8H       Infusion Meds    - MEDICATION INSTRUCTIONS -       - MEDICATION INSTRUCTIONS -       sodium chloride Stopped (07/13/21 2104)       PRN Meds  acetaminophen **OR** [DISCONTINUED] acetaminophen, flumazenil, ibuprofen, - MEDICATION INSTRUCTIONS -, - MEDICATION INSTRUCTIONS -, naloxone, naloxone, naloxone, naloxone, ondansetron **OR** ondansetron, oxyCODONE, prochlorperazine **OR** prochlorperazine **OR** prochlorperazine, sodium chloride (PF)       PHYSICAL EXAMINATION  Temp:  [97.8  F (36.6  C)-98.4  F (36.9  C)] 98.4  F (36.9  C)  Pulse:  [53-81] 61  Resp:  [7-18] 16  BP: (103-153)/(46-79) 129/65  SpO2:  [98 %-100 %] 100 %      Neuro Exam  AOx3  No dysarthria, no aphasia  Walking around room with grossly intact strength    Stroke Scales    Imaging  I personally reviewed all imaging; relevant findings per HPI.     Lab Results Data   CBC  Recent Labs   Lab 07/10/21  0826 07/09/21  0430   WBC 5.9 9.6   RBC 5.26 5.60   HGB 15.8 16.4   HCT 48.2 50.5    275     Basic Metabolic Panel    Recent Labs   Lab 07/11/21  0704 07/10/21  0826 07/09/21  0430    137 138   POTASSIUM 4.1 4.4 3.7   CHLORIDE 104 104 106   CO2 26 28 27   BUN 17 17 21   CR 1.21 1.30* 1.36*   * 105* 104*   MAYA 9.3 9.2 9.4     Liver Panel  No results for input(s): PROTTOTAL, ALBUMIN, BILITOTAL, ALKPHOS, AST, ALT, BILIDIRECT in the last 168 hours.  INR    Recent Labs   Lab Test 07/09/21  0430 07/07/21  1241   INR 1.00 1.05      Lipid Profile    Recent Labs   Lab Test 07/06/21  1204   CHOL " 127   HDL 58   LDL 50   TRIG 93     A1C    Recent Labs   Lab Test 07/06/21  1204   A1C 5.2     Troponin I  No results for input(s): TROPI in the last 168 hours.

## 2021-07-14 NOTE — PROGRESS NOTES
Radiology Note:    Order has been received in radiology department for an epidural blood patch for a post-LP headache. LP was done 7/9/21. However, patient has been on Plavix; last dose was yesterday 7/13 at 1127. Per radiology guidelines, 5 day hold is recommended prior to epidural procedure. Agree with anesthesia recommendations for conservative management at this time. If patient is discharged and remains off Plavix (aspirin is ok) but positional headache persists despite conservative measures, we could do procedure as outpatient next week. Thanks    Isaura Das PA-C  Radiology

## 2021-07-14 NOTE — PLAN OF CARE
VSS. Tele sinus dysrhythmia. A&O x4. Neuros intact. C/o headache while standing and backache. Ibuprofen given and effective, declined need for stronger pain med. Right groin site CDI. Awaiting okay for discharge.

## 2021-07-14 NOTE — TELEPHONE ENCOUNTER
email sent to [facility] to assist with provider availability.    CALLIE RODRIGES, [facility] [address]

## 2021-07-14 NOTE — DISCHARGE SUMMARY
Discharge Summary  Hospitalist    Date of Admission:  7/9/2021  Date of Discharge:  7/14/2021  Discharging Provider: Marjorie Ambrose MD  Date of Service (when I saw the patient): 07/14/21    Discharge Diagnoses   Recent Multiple acute ischemic strokes/ESUS with PFO   Spinal Headache     History of Present Illness   Please refer H & P for details.      Hospital Course   Ravinder Bardales is a 20 year old male who was recently admitted when he presented with a variety of neurological symptoms over the last 2 weeks and was found to have embolic stroke with unknown source and PFO, discharged on aspirin 81 mg daily with follow up with Neurology and cardiology for PFO closure.  Subsequently readmitted  with right upper extremity weakness, numbness, slurred speech, blurry vision and headache.         Assessment & Plan     Recent Multiple acute ischemic strokes/ESUS with PFO   Please see notes from last hospitalization regarding recent admission for stroke.  Currently readmitted with right upper extremity numbness, weakness, headache, blurry vision and slurred speech.  Repeat MRI on admission shows no acute stroke.  Did not show multiple small acute infarcts not significantly changed.      Neurology was consulted, previous work up included:       Assessment    CT Head Late presentation, not performed    MRI  MRI 7/9/21:  Seems like expected evolution of previous seen infarcts.      MRI 7/10:  No further stroke seen    Head Vessel Imaging  no significant stenosis    Neck Vessel Imaging  no significant stenosis    Cardiac TTE:   The visual ejection fraction is 55-60%.  The right ventricle is normal in size and function.  A contrast injection (Bubble Study) was performed that was mildly positive for  flow across the interatrial septum.  No significant valve disease.  PHUONG:  There is a small patent foramen ovale (PFO) present. There is mild bi-  directional shunting present, with Left to Right shunting on color Doppler,  and Right  to Left shunting with a mildly positive bubble study with the  Valsalva maneuver only. Bubble study was negative at rest. Right atrial aspect  defect measures 0.1 cm. Left atrial aspect defect measures 0.1 cm. Tunnel  length measures 1.2 cm. Aortic valve rim (aortic short-axis) measures 0.9 cm.  AV valve rim (4-chamber) measures 1.1 cm. SVC rim (bi-caval) measures 1.3 cm.  Interatrial septum is not aneurysmal, there are no fenestrations.  Sweep image obtained (labeled SWEEP).     Left ventricular size, global systolic function, and wall motion are normal,  estimated LVEF 55-60%.  Right ventricular global function is normal.  No significant valvular abnormalities.  The left atrial appendage was well visualized and free of thrombus.    EKG Sinus    Blood Pressure Goal: < 140/80    Antiplt/Anticoag      LDL               Lab Results   Component Value Date/Time     LDL 50 07/06/2021 12:04 PM        A1C               Lab Results   Component Value Date/Time     A1C 5.2 07/06/2021 12:04 PM        Troponin               Lab Results   Component Value Date/Time     TROPI <0.015 07/06/2021 05:46 PM     TROPI <0.015 07/06/2021 12:04 PM         MRV pelvis and doppler unremarkable    CT Chest abdom pelvis 1.  No abnormal masses or lymphadenopathy in the chest, abdomen, or  pelvis.     2.  Vasculature appears normal.    CSF studies CSF results:         Lab Results   Component Value Date     CRBC   07/09/2021       Test not performed. Criteria not met for second cell count.     CRBC 0 07/09/2021     CWBC   07/09/2021       Test not performed. Criteria not met for second cell count.     CWBC 60 (H) 07/09/2021     CTP 35 07/09/2021     CGLU 58 07/09/2021            Lab Results   Component Value Date/Time     HSCSF1 Not Detected 07/09/2021 02:32 PM     HSCSF2 Not Detected 07/09/2021 02:32 PM             Lab Results   Component Value Date/Time     MEK1 Negative 07/09/2021 02:32 PM     MEHI Negative 07/09/2021 02:32 PM     EMANUEL  Negative 07/09/2021 02:32 PM     MEMEN Negative 07/09/2021 02:32 PM     MEGBS Negative 07/09/2021 02:32 PM     MESPN Negative 07/09/2021 02:32 PM     MECMV Negative 07/09/2021 02:32 PM     MEENT Negative 07/09/2021 02:32 PM     MEHS1 Negative 07/09/2021 02:32 PM     MEHS2 Negative 07/09/2021 02:32 PM     MEHV6 Negative 07/09/2021 02:32 PM     MEPAR Negative 07/09/2021 02:32 PM     MEVZV Negative 07/09/2021 02:32 PM     MECRP Negative 07/09/2021 02:32 PM     MECOM   07/09/2021 02:32 PM       Assay performed using the FDA-cleared [employer] ME Panel from [employer]      VZV: Negative  Crypto: Negative  Lyme: IgG Pending  B Burgdorferi: Negative  Lyme disease LUKAS: IGG IGM Negative  Lyme disease DNA: Pending  VDRL Negative  OCB: WNL  EBV: Pending  CMV: Pending  Fungus Culture: pending  CSF Culture: pending  Gram stain: no organisms               Autoimmune labs       Lab Results   Component Value Date/Time     CRP <2.9 07/10/2021 08:26 AM     SED 4 07/10/2021 08:26 AM            TSH   Date Value Ref Range Status   07/10/2021 1.36 0.40 - 4.00 mU/L Final      GABRIEL: Negative  RF: WNL  ANCA IgG: Pending  CCP: Pending  Double-stranded DNA: Pending  Antinuclear antibody: Pending  Myeloperoxidase and proteinase 3: WNL  Rheumatoid factor: Pending  Protein immunofixation serum: Pending  HIV: Nonreactive  Hepatitis B: Nonreactive  Hepatitis A: IgG - Reactive; IgM - Non-reactive  Hepatitis C: non-reactive  Hepatitis D: Pending  Hepatitis C pending  SSA/SSB: Pending  Smith antibody: Pending  RNP antibody: Pending  Elidia 1 antibody: Pending  SCL 70: Pending  Centromere antibody: Pending  Cryoglobulin identification: Pending  Protein electrophoresis: WNL  Kappa lambda light chain: WNL  C3/C4: 117/20  Lactic Acid: Negative  CK: 58  Covid Abhinav ab: pending  Covid PCR: Negative  Resp Panel: Negative  Lyme disease: Pending  Influenza: Negative      Hyper coag labs Labs Pending  - Homocysteine: Pending  - Prot C: WNL  - Prot S:  WNL  - Lupus AC: Negative  - Factor 5, 2, Prothrombin  - Anticardiolipin: WNL  - Beta 2 glycoprotein: WNL  - ATIII: WNL       He underwent repeat MRI brain with and without contrast, MRA head and Neck and CT chest abdomen and pelvis with contrast.  No new or acute findings noted on any of the studies.  LP done on admission, show elevated WBC of 60 with 90% lymphocytes. Cause is uncertain, as CSF protein and glucose normal. Gram stain negative, culture negative so far, HSV, Cryptococcus and Meningitis/encephalitis panel negative. CRP normal. MPO negative, Lyme CSF serology negative.       Plavix 75 mg daily added to aspirin 81 mg daily.  Plavix currently on hold given spinal headache.  See below.  Cerebral angiogram done  is negative for any abnormality or Vasculitis at this time.   Will be discharging with cardiac monitor x30 days.      PFO  Chest tightness  EKG and troponin normal.  Echo shows EF of 55-60%, normal diastolic function, no WMA.  No significant valve disease.   No more chest pain or tightness at this time. .   He does have small PFO, Cardiology has evaluated him during his previous admission and recommended closure.  Follow-up with cardiology in 6 to 8 weeks.     Spinal Headache   Developed headache following lumbar puncture.  Headache is positional, worse with standing.  Blood patch would be definitive treatment but as he is on Plavix, anesthesia recommended against it.  Currently Plavix is on hold.  Last dose was on 7/13/2021.  Headache is slowly improving.  Discharged with instructions to hold Plavix for the next few days till headache is improved.  If headache is improved/resolved over next few days patient can resume Plavix.  However if headache is persistent or worsening, will need to come back for blood patch next week.  Follow-up with PCP within a week.    Follow-up with stroke clinic as previously arranged by them.  Stable at discharge home.         Marjorie Ambrose MD, MD      Pending Results    These results will be followed up by Hospitalist team.  Unresulted Labs Ordered in the Past 30 Days of this Admission     Date and Time Order Name Status Description    7/10/2021  9:28 AM Cryoglobulin identification In process     7/10/2021  9:28 AM HBV HCV HIV WNV by JACOB In process     7/10/2021  9:28 AM Hepatitis D virus antibody IgM In process     7/9/2021  2:32 PM Fungus Culture, non-blood Preliminary     7/9/2021  9:49 AM Cytomegalovirus Quant PCR Non Blood Tube 3 In process     7/9/2021  9:49 AM Freeze Sample CSF Tube 3: Laboratory Miscellaneous Order In process     7/9/2021  9:49 AM Cytology non gyn CSF Tube 4 In process     7/7/2021  9:16 AM Factor 5 leiden mutation analysis In process     7/7/2021  9:16 AM Factor 2 and 5 mutation analysis In process     7/7/2021  9:16 AM F2 prothrombin [id number] Mut Anal In process           Code Status   Full Code       Primary Care Physician   Physician No Ref-Primary    Follow-ups Needed After Discharge   Follow-up Appointments     Follow-up and recommended labs and tests       Follow up with primary care provider, Physician No Ref-Primary, within 7   days for hospital follow- up.  Follow up with Stroke clinic as previously arranged.  Follow up with Dr Hu in cardiology in 6-8 weeks to discuss PFO closure               Physical Exam   Temp: 98.4  F (36.9  C) Temp src: Oral BP: 129/65 Pulse: 61   Resp: 16 SpO2: 100 % O2 Device: None (Room air)    Vitals:    07/09/21 0430 07/09/21 1256   Weight: 81.6 kg (180 lb) 80.7 kg (177 lb 14.6 oz)     Vital Signs with Ranges  Temp:  [97.8  F (36.6  C)-98.4  F (36.9  C)] 98.4  F (36.9  C)  Pulse:  [53-81] 61  Resp:  [12-16] 16  BP: (103-147)/(46-68) 129/65  SpO2:  [98 %-100 %] 100 %  I/O last 3 completed shifts:  In: 120 [P.O.:120]  Out: -     Constitutional: Alert, cooperative, no apparent distress  Respiratory: Non labored breathing, clear to auscultation bilaterally, no crackles or wheezing  Cardiovascular: Regular rate and  rhythm, no murmurs, no edema  GI: Normal bowel sounds, soft, non-distended, non-tender  Skin: No obvious rash  Neuro: Alert, engages in appropriate conversation, fluent speech, moving all extremities, no facial asymmetry  Psych: Calm and pleasant, no obvious anxiety/ depression      Discharge Disposition   Discharged to home  Condition at discharge: Stable    Consultations This Hospital Stay   PATIENT [facility] IP CONSULT  SWALLOW EVAL SPEECH PATH AT BEDSIDE IP CONSULT  SPEECH LANGUAGE PATH ADULT IP CONSULT  PHARMACY IP CONSULT  PHARMACY IP CONSULT  PHARMACY IP CONSULT  PHYSICAL THERAPY ADULT IP CONSULT  OCCUPATIONAL THERAPY ADULT IP CONSULT  CARE MANAGEMENT / SOCIAL WORK IP CONSULT  NEUROLOGY IP CONSULT  ANESTHESIOLOGY IP CONSULT  SMOKING CESSATION PROGRAM IP CONSULT    Time Spent on this Encounter   Marjorie TAVAREZ MD, personally saw the patient today and spent greater than 30 minutes discharging this patient.    Discharge Orders      SPEECH THERAPY REFERRAL      Reason for your hospital stay    You were hospitalized with recurrent neurological symptoms concerning for stroke, however imaging did not show any new strokes.     Follow-up and recommended labs and tests     Follow up with primary care provider, Physician No Ref-Primary, within 7 days for hospital follow- up.  Follow up with Stroke clinic as previously arranged.  Follow up with Dr Hu in cardiology in 6-8 weeks to discuss PFO closure     Activity    Your activity upon discharge: activity as tolerated     When to contact your care team    Call your primary doctor if you have any of the following: Worsening headache, any new or worsening numbness, weakness, dizziness, blurred vision, speech difficulty, balance issues     Discharge Instructions    You have been started on aspirin and Plavix.  Plavix currently is on hold as you are having a post spinal tap headache.  In case headache is persistent over the next several days, a blood patch  procedure may be required.  For this you must be off Plavix for 5 days.  Hence Plavix is on hold.  Your last dose was on morning of 7/13/2021.  If your headache improves over the next couple of days, please restart taking Plavix 75 mg tablet once daily every morning.  You can restart taking it this weekend or whenever your headache is definitely improved and you no longer need a possible blood patch procedure.     Full Code     Cardiac Event Monitor Adult/Pediatric     Diet    Follow this diet upon discharge: Orders Placed This Encounter      Regular Diet Adult     Discharge Medications   Current Discharge Medication List      START taking these medications    Details   acetaminophen (TYLENOL) 325 MG tablet Take 2 tablets (650 mg) by mouth every 4 hours as needed for mild pain    Associated Diagnoses: Cerebrovascular accident (CVA), unspecified mechanism (H)      acetaminophen-caffeine (EXCEDRIN TENSION HEADACHE) 500-65 MG TABS Take 1-2 tablets by mouth every 6 hours as needed for mild pain  Qty: 15 tablet, Refills: 0    Associated Diagnoses: Cerebrovascular accident (CVA), unspecified mechanism (H)      clopidogrel (PLAVIX) 75 MG tablet Take 1 tablet (75 mg) by mouth daily  Qty: 30 tablet, Refills: 0    Associated Diagnoses: Cerebrovascular accident (CVA), unspecified mechanism (H)      cyclobenzaprine (FLEXERIL) 10 MG tablet Take 1 tablet (10 mg) by mouth 3 times daily as needed for muscle spasms  Qty: 15 tablet, Refills: 0    Associated Diagnoses: Cerebrovascular accident (CVA), unspecified mechanism (H)      ibuprofen (ADVIL/MOTRIN) 400 MG tablet Take 1 tablet (400 mg) by mouth every 6 hours as needed for moderate pain    Associated Diagnoses: Cerebrovascular accident (CVA), unspecified mechanism (H)         CONTINUE these medications which have NOT CHANGED    Details   aspirin (ASA) 81 MG EC tablet Take 1 tablet (81 mg) by mouth daily  Qty: 90 tablet, Refills: 0    Associated Diagnoses: Cerebrovascular accident  (CVA), unspecified mechanism (H)           Allergies   No Known Allergies  Data   Most Recent 3 CBC's:  Recent Labs   Lab Test 07/10/21  0826 07/09/21  0430 07/06/21  1204   WBC 5.9 9.6 5.3   HGB 15.8 16.4 16.2   MCV 92 90 91    275 247      Most Recent 3 BMP's:  Recent Labs   Lab Test 07/11/21  0704 07/10/21  0826 07/09/21  0430    137 138   POTASSIUM 4.1 4.4 3.7   CHLORIDE 104 104 106   CO2 26 28 27   BUN 17 17 21   CR 1.21 1.30* 1.36*   ANIONGAP 5 5 5   MAYA 9.3 9.2 9.4   * 105* 104*     Most Recent 2 LFT's:  Recent Labs   Lab Test 07/06/21  1204 09/14/18  0854   AST 23 22   ALT 39 28   ALKPHOS 83 98   BILITOTAL 2.4* 2.1*     Most Recent INR's and Anticoagulation Dosing History:  Anticoagulation Dose History     Recent Dosing and Labs Latest Ref Rng & Units 7/7/2021 7/9/2021    INR 0.86 - 1.14 1.05 1.00        Most Recent 3 Troponin's:  Recent Labs   Lab Test 07/06/21  1746 07/06/21  1204   TROPI <0.015 <0.015     Most Recent Cholesterol Panel:  Recent Labs   Lab Test 07/06/21  1204   CHOL 127   LDL 50   HDL 58   TRIG 93     Most Recent 6 Bacteria Isolates From Any Culture (See EPIC Reports for Culture Details):  Recent Labs   Lab Test 07/10/21  1055 07/09/21  1432 10/05/15  1458 01/08/15  1755   CULT No growth No growth  Culture negative after 4 days No Beta Streptococcus isolated No Beta Streptococcus isolated     Most Recent TSH, T4 and A1c Labs:  Recent Labs   Lab Test 07/10/21  0826 07/06/21  1204   TSH 1.36 1.10   A1C  --  5.2       Results for orders placed or performed during the hospital encounter of 07/09/21   CT Head Perfusion w Contrast    Narrative    EXAM: CTA  HEAD NECK WITH CONTRAST, CT HEAD W/O CONTRAST, CT HEAD PERFUSION WITH CONTRAST  LOCATION: [facility]  DATE/TIME: 7/9/2021 4:35 AM    INDICATION: Neuro deficit, acute, stroke suspected expressive aphasia and right-sided upper enlargement and weakness. History of patent foramen ovale.  COMPARISON:  None.  TECHNIQUE: Head and neck CT angiogram with IV contrast. Noncontrast head CT followed by axial helical CT images of the head and neck vessels obtained during the arterial phase of intravenous contrast administration. Axial 2D reconstructed images and   multiplanar 3D MIP reconstructed images of the head and neck vessels were performed by the technologist. Additional CT cerebral perfusion was performed utilizing a second contrast bolus. Perfusion data were post processed with generation of standard   perfusion maps and estimation of ischemic/infarcted volumes utilizing standard threshold values. Dose reduction techniques were used. All stenosis measurements made according to NASCET criteria unless otherwise specified.  CONTRAST: 170mL Isovue-370 (accession [id number]), 50mL Isovue-370 (accession [id number]), 170mL Isovue-370 (accession [id number])  COMPARISON: None.    FINDINGS:   NONCONTRAST HEAD CT:   INTRACRANIAL CONTENTS: No intracranial hemorrhage, extraaxial collection, or mass effect.  No CT evidence of acute infarct. Normal parenchymal attenuation. Normal ventricles and sulci.     VISUALIZED ORBITS/SINUSES/MASTOIDS: No intraorbital abnormality. No paranasal sinus mucosal disease. No middle ear or mastoid effusion.    BONES/SOFT TISSUES: No acute abnormality.    HEAD CTA:  ANTERIOR CIRCULATION: No stenosis/occlusion, aneurysm, or high flow vascular malformation. Standard [place] anatomy.    POSTERIOR CIRCULATION: No stenosis/occlusion, aneurysm, or high flow vascular malformation. Balanced vertebral arteries supply a normal basilar artery.     DURAL VENOUS SINUSES: Expected enhancement of the major dural venous sinuses.    NECK CTA:  RIGHT CAROTID: No measurable stenosis or dissection.    LEFT CAROTID: No measurable stenosis or dissection.    VERTEBRAL ARTERIES: No focal stenosis or dissection. Balanced vertebral arteries.    AORTIC ARCH: Classic aortic arch anatomy with no significant stenosis at  the origin of the great vessels.    NONVASCULAR STRUCTURES: Unremarkable.    CT PERFUSION:  PERFUSION MAPS: Symmetrical cerebral perfusion. No focal deficits in cerebral blood flow or volume to suggest ischemia/oligemia.    RAPID ANALYSIS:  CBF<30%: 0 mL  Tmax>6sec: 0 mL  Mismatch volume: 0 mL  Mismatch ratio: None      Impression    IMPRESSION:   HEAD CT:  1.  Normal head CT.    HEAD CTA:   1.  Normal CTA [place].    NECK CTA:  1.  Normal neck CTA.    CT PERFUSION:  1.  Normal cerebral perfusion.    Results called to Dr. Whittaker at 4:50 AM on 07/09/2021   CT Head w/o Contrast    Narrative    EXAM: CTA  HEAD NECK WITH CONTRAST, CT HEAD W/O CONTRAST, CT HEAD PERFUSION WITH CONTRAST  LOCATION: [facility]  DATE/TIME: 7/9/2021 4:35 AM    INDICATION: Neuro deficit, acute, stroke suspected expressive aphasia and right-sided upper enlargement and weakness. History of patent foramen ovale.  COMPARISON: None.  TECHNIQUE: Head and neck CT angiogram with IV contrast. Noncontrast head CT followed by axial helical CT images of the head and neck vessels obtained during the arterial phase of intravenous contrast administration. Axial 2D reconstructed images and   multiplanar 3D MIP reconstructed images of the head and neck vessels were performed by the technologist. Additional CT cerebral perfusion was performed utilizing a second contrast bolus. Perfusion data were post processed with generation of standard   perfusion maps and estimation of ischemic/infarcted volumes utilizing standard threshold values. Dose reduction techniques were used. All stenosis measurements made according to NASCET criteria unless otherwise specified.  CONTRAST: 170mL Isovue-370 (accession [id number]), 50mL Isovue-370 (accession [id number]), 170mL Isovue-370 (accession [id number])  COMPARISON: None.    FINDINGS:   NONCONTRAST HEAD CT:   INTRACRANIAL CONTENTS: No intracranial hemorrhage, extraaxial collection, or mass effect.  No  CT evidence of acute infarct. Normal parenchymal attenuation. Normal ventricles and sulci.     VISUALIZED ORBITS/SINUSES/MASTOIDS: No intraorbital abnormality. No paranasal sinus mucosal disease. No middle ear or mastoid effusion.    BONES/SOFT TISSUES: No acute abnormality.    HEAD CTA:  ANTERIOR CIRCULATION: No stenosis/occlusion, aneurysm, or high flow vascular malformation. Standard [place] anatomy.    POSTERIOR CIRCULATION: No stenosis/occlusion, aneurysm, or high flow vascular malformation. Balanced vertebral arteries supply a normal basilar artery.     DURAL VENOUS SINUSES: Expected enhancement of the major dural venous sinuses.    NECK CTA:  RIGHT CAROTID: No measurable stenosis or dissection.    LEFT CAROTID: No measurable stenosis or dissection.    VERTEBRAL ARTERIES: No focal stenosis or dissection. Balanced vertebral arteries.    AORTIC ARCH: Classic aortic arch anatomy with no significant stenosis at the origin of the great vessels.    NONVASCULAR STRUCTURES: Unremarkable.    CT PERFUSION:  PERFUSION MAPS: Symmetrical cerebral perfusion. No focal deficits in cerebral blood flow or volume to suggest ischemia/oligemia.    RAPID ANALYSIS:  CBF<30%: 0 mL  Tmax>6sec: 0 mL  Mismatch volume: 0 mL  Mismatch ratio: None      Impression    IMPRESSION:   HEAD CT:  1.  Normal head CT.    HEAD CTA:   1.  Normal CTA [place].    NECK CTA:  1.  Normal neck CTA.    CT PERFUSION:  1.  Normal cerebral perfusion.    Results called to Dr. Whittaker at 4:50 AM on 07/09/2021   CTA Head Neck with Contrast    Narrative    EXAM: CTA  HEAD NECK WITH CONTRAST, CT HEAD W/O CONTRAST, CT HEAD PERFUSION WITH CONTRAST  LOCATION: [facility]  DATE/TIME: 7/9/2021 4:35 AM    INDICATION: Neuro deficit, acute, stroke suspected expressive aphasia and right-sided upper enlargement and weakness. History of patent foramen ovale.  COMPARISON: None.  TECHNIQUE: Head and neck CT angiogram with IV contrast.  Noncontrast head CT followed by axial helical CT images of the head and neck vessels obtained during the arterial phase of intravenous contrast administration. Axial 2D reconstructed images and   multiplanar 3D MIP reconstructed images of the head and neck vessels were performed by the technologist. Additional CT cerebral perfusion was performed utilizing a second contrast bolus. Perfusion data were post processed with generation of standard   perfusion maps and estimation of ischemic/infarcted volumes utilizing standard threshold values. Dose reduction techniques were used. All stenosis measurements made according to NASCET criteria unless otherwise specified.  CONTRAST: 170mL Isovue-370 (accession [id number]), 50mL Isovue-370 (accession [id number]), 170mL Isovue-370 (accession [id number])  COMPARISON: None.    FINDINGS:   NONCONTRAST HEAD CT:   INTRACRANIAL CONTENTS: No intracranial hemorrhage, extraaxial collection, or mass effect.  No CT evidence of acute infarct. Normal parenchymal attenuation. Normal ventricles and sulci.     VISUALIZED ORBITS/SINUSES/MASTOIDS: No intraorbital abnormality. No paranasal sinus mucosal disease. No middle ear or mastoid effusion.    BONES/SOFT TISSUES: No acute abnormality.    HEAD CTA:  ANTERIOR CIRCULATION: No stenosis/occlusion, aneurysm, or high flow vascular malformation. Standard [place] anatomy.    POSTERIOR CIRCULATION: No stenosis/occlusion, aneurysm, or high flow vascular malformation. Balanced vertebral arteries supply a normal basilar artery.     DURAL VENOUS SINUSES: Expected enhancement of the major dural venous sinuses.    NECK CTA:  RIGHT CAROTID: No measurable stenosis or dissection.    LEFT CAROTID: No measurable stenosis or dissection.    VERTEBRAL ARTERIES: No focal stenosis or dissection. Balanced vertebral arteries.    AORTIC ARCH: Classic aortic arch anatomy with no significant stenosis at the origin of the great vessels.    NONVASCULAR STRUCTURES:  Unremarkable.    CT PERFUSION:  PERFUSION MAPS: Symmetrical cerebral perfusion. No focal deficits in cerebral blood flow or volume to suggest ischemia/oligemia.    RAPID ANALYSIS:  CBF<30%: 0 mL  Tmax>6sec: 0 mL  Mismatch volume: 0 mL  Mismatch ratio: None      Impression    IMPRESSION:   HEAD CT:  1.  Normal head CT.    HEAD CTA:   1.  Normal CTA [place].    NECK CTA:  1.  Normal neck CTA.    CT PERFUSION:  1.  Normal cerebral perfusion.    Results called to Dr. Whittaker at 4:50 AM on 07/09/2021   MR Brain w/o & w Contrast    Narrative    MRI BRAIN WITHOUT AND WITH CONTRAST  7/9/2021 7:22 AM    HISTORY:  Aphasia and right upper extremity weakness. Patent foramen  ovale.    TECHNIQUE:  Multiplanar, multisequence MRI of the brain without and  with 8 mL Gadavist.    COMPARISON: MRI of the brain 7/6/2021.    FINDINGS:  Multiple small areas of diffusion restriction are present  throughout the left cerebral hemisphere involving the left frontal,  parietal, and occipital lobes in a distribution that is similar  compared to the prior exam. Corresponding T2 hyperintense signal is  present. No new areas of diffusion restriction are identified.    The cerebral hemispheres, brainstem, and cerebellum otherwise  demonstrate normal morphology and signal. No abnormal enhancement.  Major intracranial flow voids are maintained.    Marrow signal is within normal limits. Trace paranasal sinus also  thickening. The visualized hepatic and mastoid cavities are  unremarkable.      Impression    IMPRESSION:  1. Multiple small acute infarcts scattered throughout the left  cerebral hemisphere, not significantly changed.  2. No new infarcts are identified.    KANDICE HUDSON MD         SYSTEM ID:  [id number]   XR Lumbar Puncture Spinal Tap Diag    Narrative    EXAM: XR LUMBAR PUNCTURE SPINAL TAP DIAGNOSTIC  7/9/2021 2:57 PM       History:  Concern for vasculitis.     PROCEDURE: The patient consented for a lumbar puncture. The  benefits  and risks of the procedure were explained to the patient, including  but not limited to worsening headache, hemorrhage, infection, lower  extremity pain, or nerve root injury. The patient was sterilely  prepped and draped with the patient in the supine position, over the  lower back. Under fluoroscopic guidance, the interlaminar spaces were  noted. 1% lidocaine was administered for local anesthetic over the  L2-3 interlaminar space, and a 20 gauge needle was advanced into the  thecal sac under fluoroscopic guidance.  There was initial aspiration  of clear CSF. Approximately 21 mL of clear CSF was then aspirated..   The needle was removed. There were no immediate complications  associated with the procedure.   Estimated blood loss during the  procedure was less than 5 mL.    Opening Pressure: 15 cm of water  Fluoro time: 0.1 minutes   Images Obtained: 2      Impression    IMPRESSION: Successful lumbar puncture.    JENNIFER JONES PA-C         SYSTEM ID:  [id number]   MR Brain w/o & w Contrast    Narrative    MRI BRAIN WITHOUT AND WITH CONTRAST  7/10/2021 10:00 AM    HISTORY:  Neuro deficit, acute, stroke suspected.    TECHNIQUE:  Multiplanar, multisequence MRI of the brain without and  with 10 mL Gadavist.    COMPARISON: Head MRI 7/9/2021.    FINDINGS:  Multiple small acute infarcts scattered throughout the left  cerebral hemisphere, unchanged. No new areas of diffusion restriction  are identified. Corresponding T2 hyperintense signal is present. Few  areas of corresponding contrast enhancement are present consistent  with areas of subacute infarct.    The cerebral hemispheres, brainstem, and cerebellum otherwise  demonstrate normal morphology and signal. No obvious vessel wall  enhancement is appreciated.    Marrow signal is within normal limits. The visualized paranasal  sinuses, tympanic cavities, and mastoid cavities are unremarkable.      Impression    IMPRESSION:  1. Multiple small acute/subacute  infarcts scattered throughout the  left cerebral hemisphere.  2. No new infarcts are identified.    KANDICE HUDSON MD         SYSTEM ID:  [id number]   MRA Brain ([place]) wo Contrast    Narrative    MR ANGIOGRAM OF THE HEAD WITHOUT CONTRAST  7/10/2021 9:57 AM     HISTORY: Neuro deficit, acute, stroke suspected.    TECHNIQUE:  3D time-of-flight MR angiogram of the head without  contrast.    COMPARISON: CTA of the head and neck 7/9/2021.    FINDINGS: The vertebral arteries, basilar artery, and posterior  cerebral arteries are patent. The internal carotid arteries, anterior  cerebral arteries, and middle cerebral arteries are patent. No  evidence of large vessel occlusion or high-grade stenosis. No evidence  of aneurysm or vascular malformation.      Impression    IMPRESSION:  Unremarkable MRA of the head.     KANDICE HUDSON MD         SYSTEM ID:  [id number]   MRA Neck (Carotids) wo & w Contrast    Narrative    MRA NECK WITHOUT AND WITH CONTRAST  7/10/2021 9:58 AM     HISTORY: Neuro deficit, acute, stroke suspected.    TECHNIQUE: 2D time-of-flight MR angiogram of the neck without contrast  and 3D MR angiogram of the neck with 10 mL Gadavist. Estimates of  carotid stenoses are made relative to the distal internal carotid  artery diameters except as noted.    COMPARISON: CTA of the head and neck 7/9/2021.    FINDINGS:  The bilateral common carotid, internal carotid, external  carotid, and vertebral arteries are patent. No evidence of large  vessel occlusion or high-grade stenosis. No evidence of dissection.      Impression    IMPRESSION: Unremarkable MRA of the neck.    KANDICE HUDSON MD         SYSTEM ID:  [id number]   CT Chest/Abdomen/Pelvis w Contrast    Narrative    CT CHEST/ABDOMEN/PELVIS WITH CONTRAST  7/10/2021 10:06 AM    CLINICAL HISTORY: Multiple strokes. Headache and fatigue. Assess for  occult malignancy or vasculitis. Known patent foramen ovale.    TECHNIQUE: CT scan of the chest, abdomen, and  pelvis was performed  following injection of IV contrast. Multiplanar reformats were  obtained. Dose reduction techniques were used.   CONTRAST: 89 mL Isovue-370        COMPARISON: CT urogram 9/18/2018.    FINDINGS:   LUNGS AND PLEURA: Patent central airways. The lungs are clear. No  pleural effusion or pneumothorax. No significant pulmonary nodule or  mass.    MEDIASTINUM/AXILLAE: Normal heart size. No pericardial effusion.  Normal caliber thoracic aorta. Residual thymic tissue in the anterior  mediastinum. No thoracic lymphadenopathy. Although not optimized for  evaluation of the pulmonary arteries, no central or main pulmonary  artery embolism. Small PFO noted.    CORONARY ARTERY CALCIFICATION: None.    HEPATOBILIARY: No significant mass or bile duct dilatation. No  calcified gallstones.     PANCREAS: Normal.    SPLEEN: Normal size spleen, without focal abnormality.    ADRENAL GLANDS: Normal.    KIDNEYS/BLADDER: Symmetric nephrograms. No hydronephrosis. No renal  mass. Normal bladder.    BOWEL: No obstruction or inflammatory change. Appendectomy.    PELVIC ORGANS: Normal.    LYMPH NODES: No abdominopelvic lymphadenopathy.    VASCULATURE: Normal abdominal aorta.    MUSCULOSKELETAL: Normal.      Impression    IMPRESSION:    1.  No abnormal masses or lymphadenopathy in the chest, abdomen, or  pelvis.    2.  Vasculature appears normal.    GIBSON KEENE MD         SYSTEM ID:  [id number]   IR Carotid Cerebral Angiogram Bilateral    Narrative    PALMER R MAIRA  [id number]  2000    History: 20 year old male with?no significant past medical history  admitted with recurrent embolic appearing ischemic infarcts in the  left cerebral hemisphere with a unremarkable stroke work-up to date  (small PFO, with bi directional shunting with valsalva) found to have  lymphocytic leukocytosis for which endovascular surgical  neuroradiology team was consulted to perform a diagnostic cerebral  angiogram to evaluate for suspected CNS  vasculitis.    Indication for the procedure:  Angiographic evaluation of cerebral  vasculitis    : Dr Mistry   [place]: James Meza MD  Anesthesia: Local anesthesia and conscious sedation  Contrast used: 25 cc of VISI 320  Fluoro time: 6.1 minutes, 855 mGy  Sedation time: 25 minutes  Sedatives: Midazolam 2.5 mg, Fentanyl 125 mcg  Other medications: None    Procedure:  1.  Diagnostic cerebral angiography and interpretation of the images.  2.  Ultrasound guided right common femoral arteriotomy with permanent  image of the anatomy stored in the electronic medical record.  3.  Selective catheterization and diagnostic cerebral angiography of  the right vertebral artery, right internal carotid artery and left  internal carotid artery  4.  Percutaneous closure of right femoral arteriotomy using manual  pressure.    Consent: The risks, benefits of a conventional diagnostic cerebral  angiography were discussed with the patient who agreed to proceed. The  risks, which were discussed included risk of stroke, arterial  dissection, groin hematoma, arteriovenous fistula in the groin and  pseudoaneurysm of the femoral artery. Subsequently, verbal and written  informed consent was obtained.    Technique: The patient was brought to the angiography suite and placed  in supine position. The medications were administered by the radiology  nursing staff. The nursing staff independently monitored the patient's  vital signs during the procedure.    The patient 's right groin was prepped and draped in standard fashion.  The right common femoral artery was palpated. Ultrasound was used to  image the common femoral artery. The femoral artery was shown to be  patent. Lidocaine was injected locally and a 4 Kittitian micropuncture  sheath was passed over a 21 gauge micropuncture needle into the right  common femoral artery, which was then exchanged for a 5 Kittitian sheath  over a J-wire. The sheath was connected to a continuous flush  of  heparinized saline. A hard copy was stored in the patient's record.    A 5 Brazilian Angeled Taper diagnostic catheter catheter was advanced  through the femoral sheath into the abdominal and thoracic aorta over  a 0.035 inch diameter Terumo glidewire. Double flush technique was  used throughout the procedure.      Findings:    Right vertebral artery: Cranial view  Under fluoroscopic guidance and using roadmap technique, the catheter  was advanced over the guidewire into the proximal right vertebral  artery. Biplane angiography was performed over the cranium.  Intracranial view of the right vertebral artery in the AP, lateral abd  oblique projections demonstrates a normal right vertebral artery. The  right posterior inferior cerebellar artery originates from the distal  right vertebral artery. The basilar artery is patent and bifurcates  into bilateral posterior cerebral arteries. The bilateral anterior  inferior cerebellar arteries and the bilateral superior cerebellar  arteries are normal. The capillary and venous phases are normal.    Right internal carotid artery injection: Cranial view   Under fluoroscopic guidance and roadmap technique, the catheter was  advanced over the glidewire into the right internal carotid artery.  Biplane angiography was performed over the cranium. Cranial view of  the right internal carotid artery injection in the AP, lateral and  oblique projections demonstrates normal cervical, petrous, laceral,  cavernous, clinoid, ophthalmic, and communicating segments of the  right internal carotid artery. The right internal carotid artery  bifurcates into the right anterior cerebral artery and right middle  cerebral artery. The proximal segments and distal branches of the  right anterior cerebral artery and right middle cerebral artery are  normal. The capillary and venous phases are normal.        Left internal carotid artery injection: Cranial view   Under fluoroscopic guidance and roadmap  technique the catheter was  advanced over the glidewire into the left internal carotid artery.  Biplane angiography was performed over the cranium. Intracranial view  of the left internal carotid artery injection in the AP, lateral and  oblique projections demonstrates normal cervical, petrous, laceral,  cavernous, clinoidal, ophthalmic, and communicating segments of the  left internal carotid artery. The left internal carotid artery  bifurcates into the left anterior cerebral artery and left middle  cerebral artery. The proximal segments and distal branches of the left  anterior cerebral artery and left middle cerebral artery are normal.  The left posterior communicating artery is visualized. The capillary  and venous phases are normal.      Upon completion of the procedure the 5 Maltese sheath was removed.  Hemostasis was obtained with 20 minutes of manual compression.  Procedure was completed without any complications. Patient was then  transferred to the floor in stable condition.    Dr Mistry was present for the entire procedure.      Impression    Impression:  1.  No cerebral angiogram. Specifically, no evidence of beading,  ectasia, or segmental areas of stenosis/dilatation in the small  medium-sized cerebral arteries to suggest cerebral vasculitis.       James Meza MD   Endovascular Surgical Neuroradiology Fellow  Pager: [phone]

## 2021-07-14 NOTE — PROGRESS NOTES
Alert, oriented times 4, up independently. Neuro's intact, except spinal headache. Tele SD. Patient discharged this afternoon, discharge instructions discussed with patient and his father. Will  meds at outside pharmacy. Patient put his cardiac monitor back on from previous admission.Will follow up with PCP, neurologist and cardiologist.

## 2021-07-14 NOTE — PROGRESS NOTES
Care Management Discharge Note    Discharge Date: 07/14/2021       Discharge Disposition: Home    Discharge Services: None    Discharge DME: None    Discharge Transportation: family or friend will provide    Private pay costs discussed: Not applicable    Education Provided on the Discharge Plan: yes regarding Cardiology f/u   Persons Notified of Discharge Plans: patient, RN  Patient/Family in Agreement with the Plan: yes    Handoff Referral Completed: No    Additional Information:  Patient to follow up with  March 6-8 weeks.  Contacted [facility] ([phone]).  They will reach out to patient to schedule this appointment.  Updated patient with this information and provided phone number for heart clinic on [facility].    Eliza Norris RN

## 2021-07-14 NOTE — TELEPHONE ENCOUNTER
----- Message from Rigoberto De La O MD sent at 7/14/2021  1:22 PM CDT -----  Regarding: Hospital FU  Stroke Hospital Follow Up    Please schedule the patient for hospital follow up with: - in 6 weeks with Dr. Hannon for neurocardiology clinic    Diagnosis: RMCA in 19yo PFO    Contact: patient him/herself    For any questions, or if this time frame does not work, please write to P [facility]    Thank you    Rigoberto De La O MD  1:21 PM 07/14/2021   Rigoberto De La O MD  P Care Coordination Stroke Team  Correction, previous to previous message, LMCA infarct not R

## 2021-07-14 NOTE — PLAN OF CARE
Pt here with CVIs. A&Ox4. Neuros intact. VSS on RA. Tele NSR. Regular diet, thin liquids. Takes pills whole with water. Up independently. C/o lower back soreness at LP site and 8/10 headache when standing-gave oxycodone x1. Scheduled flexeril more effective than oxycodone. R groin site WDL. Pt scoring green on the Aggression Stop Light Tool.  Discharge pending.

## 2021-07-15 ENCOUNTER — PATIENT OUTREACH (OUTPATIENT)
Dept: CARE COORDINATION | Facility: CLINIC | Age: 21
End: 2021-07-15

## 2021-07-15 DIAGNOSIS — Z71.89 OTHER SPECIFIED COUNSELING: ICD-10-CM

## 2021-07-15 DIAGNOSIS — I63.9 CEREBROVASCULAR ACCIDENT (CVA), UNSPECIFIED MECHANISM (H): Primary | ICD-10-CM

## 2021-07-15 LAB
COPATH REPORT: NORMAL
LAB SCANNED RESULT: NORMAL

## 2021-07-15 NOTE — PROGRESS NOTES
Clinic Care Coordination Contact  [facility]: Post-Discharge Note  SITUATION                                                      Admission:    Admission Date: 07/09/21   Reason for Admission: Recent Multiple acute ischemic strokes/ESUS with PFO Spinal Headache  Discharge:   Discharge Date: 07/14/21  Discharge Diagnosis: Recent Multiple acute ischemic strokes/ESUS with PFO Spinal Headache    BACKGROUND                                                      20 year old male who was recently admitted when he presented with a variety of neurological symptoms over the last 2 weeks and was found to have embolic stroke with unknown source and PFO, discharged on aspirin 81 mg daily with follow up with Neurology and cardiology for PFO closure.  Subsequently readmitted  with right upper extremity weakness, numbness, slurred speech, blurry vision and headache.              ASSESSMENT      Discharge Assessment  How are you doing now that you are home?: Feeling good, feel like normal  How are your symptoms? (Red Flag symptoms escalate to triage hotline per guidelines): Improved  Do you feel your condition is stable enough to be safe at home until your provider visit?: Yes  Does the patient have their discharge instructions? : Yes  Does the patient have questions regarding their discharge instructions? : No  Were you started on any new medications or were there changes to any of your previous medications? :  (Plavix - no concerns)  Does the patient have all of their medications?: Yes  Do you have questions regarding any of your medications? : No  Discharge follow-up appointment scheduled within 14 calendar days? : Yes  Discharge Follow Up Appointment Date: 07/19/21  Discharge Follow Up Appointment Scheduled with?: Primary Care Provider    Post-op  Did the patient have surgery or a procedure: No  Fever: No  Chills: No  Eating & Drinking: eating and drinking without complaints/concerns  PO Intake: regular diet  Bowel  Function: normal  Date of last BM: 07/14/21  Urinary Status: voiding without complaint/concerns        PLAN                                                      Outpatient Plan:  Follow-up and recommended labs and tests       Follow up with primary care provider, Physician No Ref-Primary, within 7   days for hospital follow- up.  Follow up with Stroke clinic as previously arranged.  Follow up with Dr Hu in cardiology in 6-8 weeks to discuss PFO closure           Future Appointments   Date Time Provider Department Center   7/19/2021  3:40 PM All Morales MD [facility]   8/11/2021  8:30 AM Aba Patricia MD [facility]   8/13/2021  9:30 AM Akanksha Hu MD [facility]         For any urgent concerns, please contact our 24 hour nurse triage line: [phone] ([phone])         Aniyah Betancourt MA

## 2021-07-15 NOTE — PROGRESS NOTES
Clinic Care Coordination Contact  Community Health Worker Initial Outreach        Patient accepts CC: No, Does not feel he needs it..     Daina NUNEZ

## 2021-07-16 LAB
CRYOGLOB SER QL: NEGATIVE %
HBV DNA SERPL QL NAA+PROBE: NORMAL
HCV RNA SERPL QL NAA+PROBE: NORMAL
HIV1+2 RNA SERPL QL NAA+PROBE: NORMAL
WNV RNA SERPL DONR QL NAA+PROBE: NORMAL

## 2021-07-17 LAB — HDV IGM SER IA-ACNC: NEGATIVE

## 2021-07-19 ENCOUNTER — OFFICE VISIT (OUTPATIENT)
Dept: FAMILY MEDICINE | Facility: CLINIC | Age: 21
End: 2021-07-19
Payer: COMMERCIAL

## 2021-07-19 VITALS
OXYGEN SATURATION: 96 % | SYSTOLIC BLOOD PRESSURE: 116 MMHG | RESPIRATION RATE: 16 BRPM | BODY MASS INDEX: 2.61 KG/M2 | WEIGHT: 18.2 LBS | TEMPERATURE: 98.2 F | DIASTOLIC BLOOD PRESSURE: 79 MMHG | HEART RATE: 103 BPM

## 2021-07-19 DIAGNOSIS — Q21.12 PFO (PATENT FORAMEN OVALE): ICD-10-CM

## 2021-07-19 DIAGNOSIS — I63.9 CEREBROVASCULAR ACCIDENT (CVA), UNSPECIFIED MECHANISM (H): ICD-10-CM

## 2021-07-19 DIAGNOSIS — Z11.3 ROUTINE SCREENING FOR STI (SEXUALLY TRANSMITTED INFECTION): Primary | ICD-10-CM

## 2021-07-19 PROCEDURE — 99214 OFFICE O/P EST MOD 30 MIN: CPT | Performed by: INTERNAL MEDICINE

## 2021-07-19 PROCEDURE — 87491 CHLMYD TRACH DNA AMP PROBE: CPT | Performed by: INTERNAL MEDICINE

## 2021-07-19 PROCEDURE — 87591 N.GONORRHOEAE DNA AMP PROB: CPT | Performed by: INTERNAL MEDICINE

## 2021-07-19 ASSESSMENT — PAIN SCALES - GENERAL: PAINLEVEL: NO PAIN (0)

## 2021-07-19 NOTE — PROGRESS NOTES
"    Assessment & Plan   Problem List Items Addressed This Visit     Cerebrovascular accident (CVA), unspecified mechanism (H)      Other Visit Diagnoses     Routine screening for STI (sexually transmitted infection)    -  Primary    Relevant Orders    NEISSERIA GONORRHOEA PCR    CHLAMYDIA TRACHOMATIS PCR    PFO (patent foramen ovale)                      BMI:   Estimated body mass index is 2.61 kg/m  as calculated from the following:    Height as of 7/9/21: 1.778 m (5' 10\").    Weight as of this encounter: 8.255 kg (18 lb 3.2 oz).   Weight management plan: Discussed healthy diet and exercise guidelines    Work on weight loss  Regular exercise    No follow-ups on file.    All Morales MD  [facility] SHIRA Castellon is a 20 year old who presents for the following health issues   HPI   Hospital Follow-up Visit:  Hospital/Nursing Home/ Rehab Facility: [facility]   Date of Admission: 7/6/2021 - 7/8/2021  Date of Discharge: 7/9/2021 - 7/14/2021  Reason(s) for Admission: Multiple acute ischemic strokes  Recent Multiple acute ischemic strokes/ESUS with PFO       Was your hospitalization related to COVID-19? No   Problems taking medications regularly:  None  Medication changes since discharge: Plavix, aspirin   Problems adhering to non-medication therapy:  None    Summary of hospitalization:  [facility] discharge summary reviewed  Diagnostic Tests/Treatments reviewed.  Follow up needed: none  Other Healthcare Providers Involved in Patient s Care:         None  Update since discharge: improved. Post Discharge Medication Reconciliation: discharge medications reconciled and changed, per note/orders.  Plan of care communicated with patient          Had bad concussion in the fall with memory happening   With stroke memory imprvoved  Then gets a little stutter  pfo   Bear down and valsava   Go to the gym  And does stuff  Staying in shape  Was miserable in the hospital with a " blood patch  Feels back to normal   Heart rate feels beating faster -     3.4-3.4 did well with the concussion  Went out of town august 13 th    Working on [employer]    Review of Systems   Constitutional, HEENT, cardiovascular, pulmonary, gi and gu systems are negative, except as otherwise noted.      Objective    /79 (BP Location: Right arm)   Pulse 103   Temp 98.2  F (36.8  C) (Oral)   Resp 16   Wt (!) 8.255 kg (18 lb 3.2 oz)   SpO2 96%   BMI 2.61 kg/m    Body mass index is 2.61 kg/m .  Physical Exam   GENERAL: healthy, alert and no distress  EYES: Eyes grossly normal to inspection, PERRL and conjunctivae and sclerae normal  HENT: ear canals and TM's normal, nose and mouth without ulcers or lesions  NECK: no adenopathy, no asymmetry, masses, or scars and thyroid normal to palpation  RESP: lungs clear to auscultation - no rales, rhonchi or wheezes  CV: regular rate and rhythm, normal S1 S2, no S3 or S4, no murmur, click or rub, no peripheral edema and peripheral pulses strong  ABDOMEN: soft, nontender, no hepatosplenomegaly, no masses and bowel sounds normal  MS: no gross musculoskeletal defects noted, no edema  SKIN: no suspicious lesions or rashes  NEURO: Normal strength and tone, mentation intact and speech normal  NEURO:  equal  strength, neg Romberg, DTR II/IV bilaterally (UE and LE), finger to nose normal, CN intact, ambulates without difficulty.  no focal deficits noted.  CRANIAL NERVES: Discs flat. Pupils equal, round and reactive to light. Extraocular movements full. Visual fields full. Face moves symmetrically. Tongue midline. Hearing intact to finger rubbing. CARDIOVASCULAR: S1, S2.   NEUROLOGIC: Motor strength 5/5, reflexes 2/4. Toe signs are down-going. Good finger-nose-finger, fine finger movement, and heel-shin maneuvers. Gait normal-based.    BACK: no CVA tenderness, no paralumbar tenderness  PSYCH: mentation appears normal, affect normal/bright    No results found for any  visits on 07/19/21.

## 2021-07-21 LAB
C TRACH DNA SPEC QL NAA+PROBE: NEGATIVE
N GONORRHOEA DNA SPEC QL NAA+PROBE: NEGATIVE

## 2021-07-29 ENCOUNTER — NURSE TRIAGE (OUTPATIENT)
Dept: NURSING | Facility: CLINIC | Age: 21
End: 2021-07-29

## 2021-07-29 VITALS
TEMPERATURE: 98.7 F | SYSTOLIC BLOOD PRESSURE: 149 MMHG | DIASTOLIC BLOOD PRESSURE: 62 MMHG | BODY MASS INDEX: 24.77 KG/M2 | OXYGEN SATURATION: 98 % | RESPIRATION RATE: 16 BRPM | HEART RATE: 78 BPM | WEIGHT: 173 LBS | HEIGHT: 70 IN

## 2021-07-29 LAB
ABO/RH(D): NORMAL
ANTIBODY SCREEN: NEGATIVE
SPECIMEN EXPIRATION DATE: NORMAL

## 2021-07-29 PROCEDURE — 80053 COMPREHEN METABOLIC PANEL: CPT | Performed by: EMERGENCY MEDICINE

## 2021-07-29 PROCEDURE — 85610 PROTHROMBIN TIME: CPT | Performed by: EMERGENCY MEDICINE

## 2021-07-29 PROCEDURE — 99283 EMERGENCY DEPT VISIT LOW MDM: CPT

## 2021-07-29 PROCEDURE — 85025 COMPLETE CBC W/AUTO DIFF WBC: CPT | Performed by: EMERGENCY MEDICINE

## 2021-07-29 PROCEDURE — 85730 THROMBOPLASTIN TIME PARTIAL: CPT | Performed by: EMERGENCY MEDICINE

## 2021-07-29 PROCEDURE — 86900 BLOOD TYPING SEROLOGIC ABO: CPT | Performed by: EMERGENCY MEDICINE

## 2021-07-29 PROCEDURE — 81001 URINALYSIS AUTO W/SCOPE: CPT | Performed by: EMERGENCY MEDICINE

## 2021-07-29 PROCEDURE — 36415 COLL VENOUS BLD VENIPUNCTURE: CPT | Performed by: EMERGENCY MEDICINE

## 2021-07-29 ASSESSMENT — MIFFLIN-ST. JEOR: SCORE: 1795.97

## 2021-07-30 ENCOUNTER — HOSPITAL ENCOUNTER (EMERGENCY)
Facility: CLINIC | Age: 21
Discharge: HOME | End: 2021-07-30
Attending: EMERGENCY MEDICINE | Admitting: EMERGENCY MEDICINE
Payer: COMMERCIAL

## 2021-07-30 DIAGNOSIS — R31.9 HEMATURIA, UNSPECIFIED TYPE: ICD-10-CM

## 2021-07-30 DIAGNOSIS — Z79.01 LONG TERM CURRENT USE OF ANTICOAGULANT THERAPY: ICD-10-CM

## 2021-07-30 LAB
ALBUMIN SERPL-MCNC: 4.1 G/DL (ref 3.4–5)
ALBUMIN UR-MCNC: NEGATIVE MG/DL
ALP SERPL-CCNC: 71 U/L (ref 40–150)
ALT SERPL W P-5'-P-CCNC: 29 U/L (ref 0–70)
ANION GAP SERPL CALCULATED.3IONS-SCNC: 3 MMOL/L (ref 3–14)
APPEARANCE UR: CLEAR
APTT PPP: 28 SECONDS (ref 22–38)
AST SERPL W P-5'-P-CCNC: 30 U/L (ref 0–45)
BASOPHILS # BLD AUTO: 0 10E3/UL (ref 0–0.2)
BASOPHILS NFR BLD AUTO: 1 %
BILIRUB SERPL-MCNC: 1.6 MG/DL (ref 0.2–1.3)
BILIRUB UR QL STRIP: NEGATIVE
BUN SERPL-MCNC: 32 MG/DL (ref 7–30)
CALCIUM SERPL-MCNC: 9 MG/DL (ref 8.5–10.1)
CHLORIDE BLD-SCNC: 106 MMOL/L (ref 94–109)
CO2 SERPL-SCNC: 28 MMOL/L (ref 20–32)
COLOR UR AUTO: ABNORMAL
CREAT SERPL-MCNC: 1.24 MG/DL (ref 0.66–1.25)
EOSINOPHIL # BLD AUTO: 0.1 10E3/UL (ref 0–0.7)
EOSINOPHIL NFR BLD AUTO: 1 %
ERYTHROCYTE [DISTWIDTH] IN BLOOD BY AUTOMATED COUNT: 12.5 % (ref 10–15)
GFR SERPL CREATININE-BSD FRML MDRD: 83 ML/MIN/1.73M2
GLUCOSE BLD-MCNC: 91 MG/DL (ref 70–99)
GLUCOSE UR STRIP-MCNC: NEGATIVE MG/DL
HCT VFR BLD AUTO: 40 % (ref 40–53)
HGB BLD-MCNC: 13.8 G/DL (ref 13.3–17.7)
HGB UR QL STRIP: ABNORMAL
IMM GRANULOCYTES # BLD: 0 10E3/UL
IMM GRANULOCYTES NFR BLD: 0 %
INR PPP: 1.04 (ref 0.85–1.15)
KETONES UR STRIP-MCNC: ABNORMAL MG/DL
LEUKOCYTE ESTERASE UR QL STRIP: NEGATIVE
LYMPHOCYTES # BLD AUTO: 2 10E3/UL (ref 0.8–5.3)
LYMPHOCYTES NFR BLD AUTO: 24 %
MCH RBC QN AUTO: 30.7 PG (ref 26.5–33)
MCHC RBC AUTO-ENTMCNC: 34.5 G/DL (ref 31.5–36.5)
MCV RBC AUTO: 89 FL (ref 78–100)
MONOCYTES # BLD AUTO: 0.5 10E3/UL (ref 0–1.3)
MONOCYTES NFR BLD AUTO: 6 %
NEUTROPHILS # BLD AUTO: 5.6 10E3/UL (ref 1.6–8.3)
NEUTROPHILS NFR BLD AUTO: 68 %
NITRATE UR QL: NEGATIVE
NRBC # BLD AUTO: 0 10E3/UL
NRBC BLD AUTO-RTO: 0 /100
PH UR STRIP: 7 [PH] (ref 5–7)
PLATELET # BLD AUTO: 255 10E3/UL (ref 150–450)
POTASSIUM BLD-SCNC: 3.6 MMOL/L (ref 3.4–5.3)
PROT SERPL-MCNC: 7.1 G/DL (ref 6.8–8.8)
RBC # BLD AUTO: 4.5 10E6/UL (ref 4.4–5.9)
RBC URINE: 1 /HPF
SODIUM SERPL-SCNC: 137 MMOL/L (ref 133–144)
SP GR UR STRIP: 1.02 (ref 1–1.03)
SQUAMOUS EPITHELIAL: <1 /HPF
UROBILINOGEN UR STRIP-MCNC: NORMAL MG/DL
WBC # BLD AUTO: 8.3 10E3/UL (ref 4–11)
WBC URINE: 0 /HPF

## 2021-07-30 ASSESSMENT — ENCOUNTER SYMPTOMS
ABDOMINAL PAIN: 0
HEMATURIA: 1
DIZZINESS: 0
BACK PAIN: 0
LIGHT-HEADEDNESS: 0

## 2021-07-30 NOTE — ED TRIAGE NOTES
July 12th went plavix, related to multiple strokes.  1 wk ago had blood in urine.  Happening again today, but feels that there is a lot of blood.

## 2021-07-30 NOTE — TELEPHONE ENCOUNTER
Triage Call: Patient is call stating he is on plavix and he has been having blood in his urine about 3 times for the last 3 weeks. Patient states just now his urine was pure blood and a lot of it. Bruising a lot. Per protocol guidelines patient was advised to be seen in the ED. Patient stated he did not want to go to the ED. Nurse informed patient as to why he should be seen in the ED. Patient stated understanding.     COVID 19 Nurse Triage Plan/Patient Instructions    Please be aware that novel coronavirus (COVID-19) may be circulating in the community. If you develop symptoms such as fever, cough, or SOB or if you have concerns about the presence of another infection including coronavirus (COVID-19), please contact your health care provider or visit [email or web address].     Disposition/Instructions    ED Visit recommended. Follow protocol based instructions.     Bring Your Own Device:  Please also bring your smart device(s) (smart phones, tablets, laptops) and their charging cables for your personal use and to communicate with your care team during your visit.    Thank you for taking steps to prevent the spread of this virus.  o Limit your contact with others.  o Wear a simple mask to cover your cough.  o Wash your hands well and often.    Resources    [facility]: About COVID-19: [email or web address]    CDC: What to Do If You're Sick: www.cdc.gov/coronavirus/2019-ncov/about/steps-when-sick.html    CDC: Ending Home Isolation: www.cdc.gov/coronavirus/2019-ncov/hcp/disposition-in-home-patients.html     CDC: Caring for Someone: www.cdc.gov/coronavirus/2019-ncov/if-you-are-sick/care-for-someone.html     [facility]: Interim Guidance for Hospital Discharge to Home: www.health.[facility].[place]/diseases/coronavirus/hcp/hospdischarge.pdf    [facility] clinical trials (COVID-19 research studies): [email or web address]     Below are the COVID-19 hotlines at the [place]  Department of Health ([facility]). Interpreters are available.   o For health questions: Call 170-358-7827 or 1-208.259.5502 (7 a.m. to 7 p.m.)  o For questions about schools and childcare: Call 926-769-0050 or 1-502.595.9378 (7 a.m. to 7 p.m.)     Jordana Younger RN Nursing Advisor 7/29/2021 10:12 PM     Reason for Disposition    Passing pure blood or large blood clots (i.e., size > a dime) (Exception: osmel or small strands)    Additional Information    Negative: Shock suspected (e.g., cold/pale/clammy skin, too weak to stand, low BP, rapid pulse)    Negative: Sounds like a life-threatening emergency to the triager    Negative: Urinary catheter, questions about    Negative: Recent back or abdominal injury    Negative: Recent genital injury    Negative: [1] Unable to urinate (or only a few drops) > 4 hours AND [2] bladder feels very full (e.g., palpable bladder or strong urge to urinate)    Protocols used: URINE - BLOOD IN-A-

## 2021-07-30 NOTE — ED PROVIDER NOTES
"  History   Chief Complaint:  Hematuria       HPI   Ravinder Bardales is a 20 year old male with history of stroke, PFO, aphasia on Plavix who presents with hematuria. The patient reports he has had hematuria around a week ago. He states that he had hematuria today again after coming home from the gym, he states the amount was more than usual. He states he had 2 episodes of hematuria today within 15 minutes. He states in the ED he had normal urine. He denies abdominal pain or back pain. No dizziness or lightheadedness.     Review of Systems   Gastrointestinal: Negative for abdominal pain.   Genitourinary: Positive for hematuria.   Musculoskeletal: Negative for back pain.   Neurological: Negative for dizziness and light-headedness.   All other systems reviewed and are negative.    Allergies:  The patient has no known allergies.     Medications:  Aspirin 81 mg  Plavix  Flexeril    Past Medical History:    Expressive aphasia  Right sided weakness   CVA  Left knee pain  Lymphadenopathy     Past Surgical History:    Appendectomy   Arthroscopy knee  Arthroscopy knee with fixation of osteochondral dissecans  IR carotid cerebral angiogram bilateral  Orthopedic surgery  Remove hardware knee     Social History:  The patient presents alone.  He works out.     Physical Exam     Patient Vitals for the past 24 hrs:   BP Temp Temp src Pulse Resp SpO2 Height Weight   07/29/21 2337 (!) 149/62 98.7  F (37.1  C) Temporal 78 16 98 % 1.778 m (5' 10\") 78.5 kg (173 lb)       Physical Exam  Resp:               Non-labored  Neuro:             Alert and cooperative  MSkel:             Moving all extremities  Skin:                Multiple bruises    Emergency Department Course     Laboratory:  CBC: WBC: 8.3, HGB: 13.8, PLT: 255  CMP: Urea Nitrogen: 32 (H), Bilirubin Total: 1.6 (H), o/w WNL (Creatinine: 1.24)    INR: 1.04  PTT: 28  ABO/Rh Type and Screen: A positive    UA: Ketones: Trace, Blood: Small, o/w Negative      Emergency Department " Course:    Reviewed:  I reviewed nursing notes, vitals, past medical history and care everywhere    Assessments:  0103 I obtained history and examined the patient as noted above. Lab results were discussed and patient was discharged.       Disposition:  The patient was discharged to home.       Impression & Plan     Medical Decision Making:  Ravinder Bardales is here for evaluation of episodic hematuria in the setting of long term anticoagulant use. This is concerning for a potential large amount of blood loss. He does not clinically appear to have a kidney stone. Urine shows no sign of infection but also no microscopic hematuria. He confirms this urine was completely clear without blood. Hemoglobin returned completely normal. There are no changes in his platelet count or renal function. With no associated pain I do not think CT scan is indicated at this point in time as Kidney stone is not suspected. He will be following up with Urology or returning for persistent hematuria, multiple clots, or retention.      Diagnosis:    ICD-10-CM    1. Hematuria, unspecified type  R31.9    2. Long term current use of anticoagulant therapy  Z79.01        Scribe Disclosure:  I, Win Lauren, am serving as a scribe at 1:02 AM on 7/30/2021 to document services personally performed by Maria Dolores Pachceo MD based on my observations and the provider's statements to me.          Maria Dolores Pacheco MD  07/30/21 2198

## 2021-08-06 LAB
FUNGUS SPEC CULT: NORMAL
SPECIMEN SOURCE: NORMAL

## 2021-08-11 ENCOUNTER — OFFICE VISIT (OUTPATIENT)
Dept: NEUROLOGY | Facility: CLINIC | Age: 21
End: 2021-08-11
Payer: COMMERCIAL

## 2021-08-11 VITALS
BODY MASS INDEX: 25.11 KG/M2 | SYSTOLIC BLOOD PRESSURE: 127 MMHG | RESPIRATION RATE: 16 BRPM | WEIGHT: 175 LBS | DIASTOLIC BLOOD PRESSURE: 75 MMHG | OXYGEN SATURATION: 99 % | HEART RATE: 60 BPM

## 2021-08-11 DIAGNOSIS — Q21.12 PFO (PATENT FORAMEN OVALE): ICD-10-CM

## 2021-08-11 DIAGNOSIS — I63.9 CEREBROVASCULAR ACCIDENT (CVA), UNSPECIFIED MECHANISM (H): Primary | ICD-10-CM

## 2021-08-11 PROCEDURE — 99203 OFFICE O/P NEW LOW 30 MIN: CPT | Performed by: PSYCHIATRY & NEUROLOGY

## 2021-08-11 NOTE — PROGRESS NOTES
32 minutes spent on the date of the encounter doing chart review, review of test results, interpretation of tests, patient visit and documentation          Return in about 6 months (around 2/11/2022), or if symptoms worsen or fail to improve.    Aba Patricia MD  [facility] [place]    _____________________________________________________________    [facility]    at [facility]  _____________________________________________________________      Chief Complaint: Patient presents with:  RECHECK: UMP RETURN STROKE      History of Present Illness: Ravinder Bardales is a 20 year old male presenting as a follow up patient.     Ravinder Bardales is a 20 year old male with past medical history of previous knee surgery.  Patient presents after a series of neurologic complaints which started Saturday.  He states symptoms first started while he was Valsalva while using the restroom and started with an electric-like sensation that went down the right arm leading to several minutes of arm weakness and and more prolonged numbness.  This was followed by word finding difficulty where he was able to understand everyone he just was unable to get the words out and had stuttering expressive speech.  This was followed by right eye vision loss described as dark coming from the superior vision and extending to the lower vision and resolving over a period of minutes leaving some blurry vision before fully resolving.  The timing of the symptoms lasted approximately 20 minutes total with some prolonged numbness in the right arm.  A day later Sunday 7/4 patient was noted to have a left arm numbness and weakness which resolved.  Monday 7/5 patient was riding his motorcycle and he had a repeat of vision loss similar in semiology as above.     Assessment    CT Head Late presentation, not performed    MRI    MRI 7/6/21      MRI 7/9/21:  Seems  like expected evolution of previous seen infarcts.      MRI 7/10:  No further stroke seen    Head Vessel Imaging  no significant stenosis    Neck Vessel Imaging  no significant stenosis    Cardiac TTE:   The visual ejection fraction is 55-60%.  The right ventricle is normal in size and function.  A contrast injection (Bubble Study) was performed that was mildly positive for  flow across the interatrial septum.  No significant valve disease.  PHUONG:  There is a small patent foramen ovale (PFO) present. There is mild bi-  directional shunting present, with Left to Right shunting on color Doppler,  and Right to Left shunting with a mildly positive bubble study with the  Valsalva maneuver only. Bubble study was negative at rest. Right atrial aspect  defect measures 0.1 cm. Left atrial aspect defect measures 0.1 cm. Tunnel  length measures 1.2 cm. Aortic valve rim (aortic short-axis) measures 0.9 cm.  AV valve rim (4-chamber) measures 1.1 cm. SVC rim (bi-caval) measures 1.3 cm.  Interatrial septum is not aneurysmal, there are no fenestrations.  Sweep image obtained (labeled SWEEP).     Left ventricular size, global systolic function, and wall motion are normal,  estimated LVEF 55-60%.  Right ventricular global function is normal.  No significant valvular abnormalities.  The left atrial appendage was well visualized and free of thrombus.    EKG Sinus    Blood Pressure Goal: < 140/80    Antiplt/Anticoag      LDL               Lab Results   Component Value Date/Time     LDL 50 07/06/2021 12:04 PM        A1C               Lab Results   Component Value Date/Time     A1C 5.2 07/06/2021 12:04 PM        Troponin               Lab Results   Component Value Date/Time     TROPI <0.015 07/06/2021 05:46 PM     TROPI <0.015 07/06/2021 12:04 PM         MRV pelvis and doppler unremarkable    CT Chest abdom pelvis 1.  No abnormal masses or lymphadenopathy in the chest, abdomen, or  pelvis.     2.  Vasculature appears normal.    CSF studies  CSF results:         Lab Results   Component Value Date     CRBC   07/09/2021       Test not performed. Criteria not met for second cell count.     CRBC 0 07/09/2021     CWBC   07/09/2021       Test not performed. Criteria not met for second cell count.     CWBC 60 (H) 07/09/2021     CTP 35 07/09/2021     CGLU 58 07/09/2021            Lab Results   Component Value Date/Time     HSCSF1 Not Detected 07/09/2021 02:32 PM     HSCSF2 Not Detected 07/09/2021 02:32 PM             Lab Results   Component Value Date/Time     MEK1 Negative 07/09/2021 02:32 PM     MEHI Negative 07/09/2021 02:32 PM     MELIST Negative 07/09/2021 02:32 PM     MEMEN Negative 07/09/2021 02:32 PM     MEGBS Negative 07/09/2021 02:32 PM     MESPN Negative 07/09/2021 02:32 PM     MECMV Negative 07/09/2021 02:32 PM     MEENT Negative 07/09/2021 02:32 PM     MEHS1 Negative 07/09/2021 02:32 PM     MEHS2 Negative 07/09/2021 02:32 PM     MEHV6 Negative 07/09/2021 02:32 PM     MEPAR Negative 07/09/2021 02:32 PM     MEVZV Negative 07/09/2021 02:32 PM     MECRP Negative 07/09/2021 02:32 PM     MECOM   07/09/2021 02:32 PM       Assay performed using the FDA-cleared FilmArray ME Panel from [employer]      VZV: Negative  Crypto: Negative  Lyme: IgG Pending  B Burgdorferi: Negative  Lyme disease LUKAS: IGG IGM Negative  Lyme disease DNA: Negative  VDRL Negative  OCB: WNL  EBV: Negative  CMV: Neg  Fungus Culture: Neg  CSF Culture: Neg  Gram stain: no organisms               Autoimmune labs       Lab Results   Component Value Date/Time     CRP <2.9 07/10/2021 08:26 AM     SED 4 07/10/2021 08:26 AM            TSH   Date Value Ref Range Status   07/10/2021 1.36 0.40 - 4.00 mU/L Final      GABRIEL: Negative  RF: WNL  ANCA IgG: Neg  CCP: Neg  Double-stranded DNA: Neg  Antinuclear antibody: Neg  Myeloperoxidase and proteinase 3: WNL  Rheumatoid factor: Neg  Protein immunofixation serum: Neg  HIV: Nonreactive  Hepatitis B: Nonreactive  Hepatitis A: IgG -  Reactive; IgM - Non-reactive  Hepatitis C: non-reactive  Hepatitis D: Neg  Hepatitis C Neg  SSA/SSB: Neg  Smith antibody: Neg  RNP antibody: Neg  Elidia 1 antibody: Neg  SCL 70: Neg  Centromere antibody: Neg  Protein electrophoresis: WNL  Kappa lambda light chain: WNL  C3/C4: 117/20  Lactic Acid: Negative  CK: 58  Covid Abhinav ab: Positive (Antibodies to COVID-19 detected, which may be due to COVID-19 vaccination, or   past or current COVID-19 infection.)  Covid PCR: Negative  Resp Panel: Negative  Lyme disease: Pending  Influenza: Negative      Hyper coag labs Labs Pending  - Homocysteine: 7.3  - Prot C: 96 Normal  - Prot S: 92 Normal  - Lupus AC: Negative  - Factor 5, 2; negative  - Anticardiolipin: WNL  - Beta 2 glycoprotein: WNL  - ATIII: WNL         He is doing well . Denies any new recurrent symptoms. He reports feeling R sided slight weakness when fatigued and tired. He reports feeling less on the R arm, its about 10% less. He reports compliance with medication. He did notice hematuria when on plavix and aspirin. He was wearing heart monitor as well.        Impression:   Problem List Items Addressed This Visit        Nervous and Auditory    Cerebrovascular accident (CVA), unspecified mechanism (H) - Primary      Other Visit Diagnoses     PFO (patent foramen ovale)            20 year old male with no past medical hx presented in July with R sided sensorimotor symptoms found to have L cortical stroke. Echo showed PFO. Likely etiology is embolic stroke related to PFO. His hypercoagulable work up was negative. Heart monitor is pending.    Plan:   - Stop plavix.  - Continue aspirin 81 mg daily  - Follow up with cardiology as scheduled  - Will follow up on heart monitor results.  - Follow up in 6 months  - He was counseled on stroke symptoms.     Stroke Education provided.  He will call us with any questions.  For any acute neurologic deficits he was advised to  go directly to the hospital rather than call the  "clinic.    Aba Patricia MD  Neurology  08/11/2021 8:19 AM  To page me or covering stroke neurology team member, click here: AMCOM  Choose \"On Call\" tab at top, then search dropdown box for \"Neurology Adult\" & press Enter, look for Neuro ICU/Stroke    ___________________________________________________________________    Current Medications  Current Outpatient Medications   Medication Sig     acetaminophen (TYLENOL) 325 MG tablet Take 2 tablets (650 mg) by mouth every 4 hours as needed for mild pain     aspirin (ASA) 81 MG EC tablet Take 1 tablet (81 mg) by mouth daily     clopidogrel (PLAVIX) 75 MG tablet Take 1 tablet (75 mg) by mouth daily     acetaminophen-caffeine (EXCEDRIN TENSION HEADACHE) 500-65 MG TABS Take 1-2 tablets by mouth every 6 hours as needed for mild pain (Patient not taking: Reported on 7/19/2021)     cyclobenzaprine (FLEXERIL) 10 MG tablet Take 1 tablet (10 mg) by mouth 3 times daily as needed for muscle spasms (Patient not taking: Reported on 7/19/2021)     ibuprofen (ADVIL/MOTRIN) 400 MG tablet Take 1 tablet (400 mg) by mouth every 6 hours as needed for moderate pain (Patient not taking: Reported on 8/11/2021)     No current facility-administered medications for this visit.       Past Medical History  No past medical history on file.    Social History  Social History     Tobacco Use     Smoking status: Never Smoker     Smokeless tobacco: Never Used   Substance Use Topics     Alcohol use: Yes     Comment: occassionally     Drug use: No       Family History  Family History   Problem Relation Age of Onset     Cancer Maternal Grandmother         liver cancer     Diabetes No family hx of      Hypertension No family hx of        ROS: 10 point relevant ROS neg other than the symptoms noted above in the HPI.    Physical Exam    Resp 16   Wt 79.4 kg (175 lb)   BMI 25.11 kg/m      General:  no acute distress  HEENT:  normocephalic/atraumatic  Pulmonary:  no respiratory distress    Neurologic  Mental " Status:  alert, oriented x 3, follows commands, speech clear and fluent, naming and repetition normal  Cranial Nerves:  EOMI with normal smooth pursuit, facial movements symmetric, hearing not formally tested but intact to conversation, no dysarthria, shoulder shrug equal bilaterally, tongue protrusion midline  Motor:  no abnormal movements, able to move all limbs antigravity spontaneously with no signs of hemiparesis observed, no pronator drift  Reflexes:  2/4 in bilateral upper and lower extremities  Sensory:  Mild decreased sensation to RUE and RLE.  Coordination:  normal finger-to-nose and heel-to-shin bilaterally without dysmetria, rapid alternating movements symmetric  Station/Gait:  Normal    Neuroimaging: as per HPI.    Labs:    Recent Labs   Lab Test 07/29/21  2349 07/09/21  0430 07/07/21  1241   INR 1.04 1.00 1.05        Recent Labs   Lab Test 07/06/21  1204   CHOL 127   HDL 58   LDL 50   TRIG 93       Recent Labs   Lab Test 07/06/21  1204   A1C 5.2       Recent Labs   Lab Test 07/06/21  1746 07/06/21  1204   TROPI <0.015 <0.015

## 2021-08-11 NOTE — PATIENT INSTRUCTIONS
Stop plavix  Continue aspirin 81 mg daily  Follow up with cardiology for PFO evaluation   Follow up in 6 months    Aba Patricia MD   Department of Neurology  Division of Stroke and Neuro-critical care  [facility]    Care coordinator  : Edwardo Ray RN, CNRN    [facility]  [address] [place], [place] [id number]    Phone: [phone]  Fax: [phone]

## 2021-08-11 NOTE — LETTER
8/11/2021       RE: Ravinder Bardales  [address] [facility] [address]     Dear Colleague,    Thank you for referring your patient, Ravinder Bardales, to the [facility] [place] at [facility]. Please see a copy of my visit note below.      32 minutes spent on the date of the encounter doing chart review, review of test results, interpretation of tests, patient visit and documentation      Return in about 6 months (around 2/11/2022), or if symptoms worsen or fail to improve.    Aba Patricia MD  [facility] [place]    _____________________________________________________________    [facility]    at [facility]  _____________________________________________________________      Chief Complaint: Patient presents with:  RECHECK: UMP RETURN STROKE      History of Present Illness: Ravinder Bardales is a 20 year old male presenting as a follow up patient.     Ravinder Bardales is a 20 year old male with past medical history of previous knee surgery.  Patient presents after a series of neurologic complaints which started Saturday.  He states symptoms first started while he was Valsalva while using the restroom and started with an electric-like sensation that went down the right arm leading to several minutes of arm weakness and and more prolonged numbness.  This was followed by word finding difficulty where he was able to understand everyone he just was unable to get the words out and had stuttering expressive speech.  This was followed by right eye vision loss described as dark coming from the superior vision and extending to the lower vision and resolving over a period of minutes leaving some blurry vision before fully resolving.  The timing of the symptoms lasted approximately 20 minutes total with some prolonged numbness in the right arm.  A day  later Sunday 7/4 patient was noted to have a left arm numbness and weakness which resolved.  Monday 7/5 patient was riding his motorcycle and he had a repeat of vision loss similar in semiology as above.     Assessment    CT Head Late presentation, not performed    MRI    MRI 7/6/21      MRI 7/9/21:  Seems like expected evolution of previous seen infarcts.      MRI 7/10:  No further stroke seen    Head Vessel Imaging  no significant stenosis    Neck Vessel Imaging  no significant stenosis    Cardiac TTE:   The visual ejection fraction is 55-60%.  The right ventricle is normal in size and function.  A contrast injection (Bubble Study) was performed that was mildly positive for  flow across the interatrial septum.  No significant valve disease.  PHUONG:  There is a small patent foramen ovale (PFO) present. There is mild bi-  directional shunting present, with Left to Right shunting on color Doppler,  and Right to Left shunting with a mildly positive bubble study with the  Valsalva maneuver only. Bubble study was negative at rest. Right atrial aspect  defect measures 0.1 cm. Left atrial aspect defect measures 0.1 cm. Tunnel  length measures 1.2 cm. Aortic valve rim (aortic short-axis) measures 0.9 cm.  AV valve rim (4-chamber) measures 1.1 cm. SVC rim (bi-caval) measures 1.3 cm.  Interatrial septum is not aneurysmal, there are no fenestrations.  Sweep image obtained (labeled SWEEP).     Left ventricular size, global systolic function, and wall motion are normal,  estimated LVEF 55-60%.  Right ventricular global function is normal.  No significant valvular abnormalities.  The left atrial appendage was well visualized and free of thrombus.    EKG Sinus    Blood Pressure Goal: < 140/80    Antiplt/Anticoag      LDL               Lab Results   Component Value Date/Time     LDL 50 07/06/2021 12:04 PM        A1C               Lab Results   Component Value Date/Time     A1C 5.2 07/06/2021 12:04 PM        Troponin                Lab Results   Component Value Date/Time     TROPI <0.015 07/06/2021 05:46 PM     TROPI <0.015 07/06/2021 12:04 PM         MRV pelvis and doppler unremarkable    CT Chest abdom pelvis 1.  No abnormal masses or lymphadenopathy in the chest, abdomen, or  pelvis.     2.  Vasculature appears normal.    CSF studies CSF results:         Lab Results   Component Value Date     CRBC   07/09/2021       Test not performed. Criteria not met for second cell count.     CRBC 0 07/09/2021     CWBC   07/09/2021       Test not performed. Criteria not met for second cell count.     CWBC 60 (H) 07/09/2021     CTP 35 07/09/2021     CGLU 58 07/09/2021            Lab Results   Component Value Date/Time     HSCSF1 Not Detected 07/09/2021 02:32 PM     HSCSF2 Not Detected 07/09/2021 02:32 PM             Lab Results   Component Value Date/Time     MEK1 Negative 07/09/2021 02:32 PM     MEHI Negative 07/09/2021 02:32 PM     MELIST Negative 07/09/2021 02:32 PM     MEMEN Negative 07/09/2021 02:32 PM     MEGBS Negative 07/09/2021 02:32 PM     MESPN Negative 07/09/2021 02:32 PM     MECMV Negative 07/09/2021 02:32 PM     MEENT Negative 07/09/2021 02:32 PM     MEHS1 Negative 07/09/2021 02:32 PM     MEHS2 Negative 07/09/2021 02:32 PM     MEHV6 Negative 07/09/2021 02:32 PM     MEPAR Negative 07/09/2021 02:32 PM     MEVZV Negative 07/09/2021 02:32 PM     MECRP Negative 07/09/2021 02:32 PM     MECOM   07/09/2021 02:32 PM       Assay performed using the FDA-cleared FilmArray ME Panel from [employer]      VZV: Negative  Crypto: Negative  Lyme: IgG Pending  B Burgdorferi: Negative  Lyme disease LUKAS: IGG IGM Negative  Lyme disease DNA: Negative  VDRL Negative  OCB: WNL  EBV: Negative  CMV: Neg  Fungus Culture: Neg  CSF Culture: Neg  Gram stain: no organisms               Autoimmune labs       Lab Results   Component Value Date/Time     CRP <2.9 07/10/2021 08:26 AM     SED 4 07/10/2021 08:26 AM            TSH   Date Value Ref Range Status    07/10/2021 1.36 0.40 - 4.00 mU/L Final      GABRIEL: Negative  RF: WNL  ANCA IgG: Neg  CCP: Neg  Double-stranded DNA: Neg  Antinuclear antibody: Neg  Myeloperoxidase and proteinase 3: WNL  Rheumatoid factor: Neg  Protein immunofixation serum: Neg  HIV: Nonreactive  Hepatitis B: Nonreactive  Hepatitis A: IgG - Reactive; IgM - Non-reactive  Hepatitis C: non-reactive  Hepatitis D: Neg  Hepatitis C Neg  SSA/SSB: Neg  Smith antibody: Neg  RNP antibody: Neg  Elidia 1 antibody: Neg  SCL 70: Neg  Centromere antibody: Neg  Protein electrophoresis: WNL  Kappa lambda light chain: WNL  C3/C4: 117/20  Lactic Acid: Negative  CK: 58  Covid Abhinav ab: Positive (Antibodies to COVID-19 detected, which may be due to COVID-19 vaccination, or   past or current COVID-19 infection.)  Covid PCR: Negative  Resp Panel: Negative  Lyme disease: Pending  Influenza: Negative      Hyper coag labs Labs Pending  - Homocysteine: 7.3  - Prot C: 96 Normal  - Prot S: 92 Normal  - Lupus AC: Negative  - Factor 5, 2; negative  - Anticardiolipin: WNL  - Beta 2 glycoprotein: WNL  - ATIII: WNL         He is doing well . Denies any new recurrent symptoms. He reports feeling R sided slight weakness when fatigued and tired. He reports feeling less on the R arm, its about 10% less. He reports compliance with medication. He did notice hematuria when on plavix and aspirin. He was wearing heart monitor as well.        Impression:   Problem List Items Addressed This Visit        Nervous and Auditory    Cerebrovascular accident (CVA), unspecified mechanism (H) - Primary      Other Visit Diagnoses     PFO (patent foramen ovale)            20 year old male with no past medical hx presented in July with R sided sensorimotor symptoms found to have L cortical stroke. Echo showed PFO. Likely etiology is embolic stroke related to PFO. His hypercoagulable work up was negative. Heart monitor is pending.    Plan:   - Stop plavix.  - Continue aspirin 81 mg daily  - Follow up with  "cardiology as scheduled  - Will follow up on heart monitor results.  - Follow up in 6 months  - He was counseled on stroke symptoms.     Stroke Education provided.  He will call us with any questions.  For any acute neurologic deficits he was advised to  go directly to the hospital rather than call the clinic.    Aba Patricia MD  Neurology  08/11/2021 8:19 AM  To page me or covering stroke neurology team member, click here: AMCOM  Choose \"On Call\" tab at top, then search dropdown box for \"Neurology Adult\" & press Enter, look for Neuro ICU/Stroke    ___________________________________________________________________    Current Medications  Current Outpatient Medications   Medication Sig     acetaminophen (TYLENOL) 325 MG tablet Take 2 tablets (650 mg) by mouth every 4 hours as needed for mild pain     aspirin (ASA) 81 MG EC tablet Take 1 tablet (81 mg) by mouth daily     clopidogrel (PLAVIX) 75 MG tablet Take 1 tablet (75 mg) by mouth daily     acetaminophen-caffeine (EXCEDRIN TENSION HEADACHE) 500-65 MG TABS Take 1-2 tablets by mouth every 6 hours as needed for mild pain (Patient not taking: Reported on 7/19/2021)     cyclobenzaprine (FLEXERIL) 10 MG tablet Take 1 tablet (10 mg) by mouth 3 times daily as needed for muscle spasms (Patient not taking: Reported on 7/19/2021)     ibuprofen (ADVIL/MOTRIN) 400 MG tablet Take 1 tablet (400 mg) by mouth every 6 hours as needed for moderate pain (Patient not taking: Reported on 8/11/2021)     No current facility-administered medications for this visit.       Past Medical History  No past medical history on file.    Social History  Social History     Tobacco Use     Smoking status: Never Smoker     Smokeless tobacco: Never Used   Substance Use Topics     Alcohol use: Yes     Comment: occassionally     Drug use: No       Family History  Family History   Problem Relation Age of Onset     Cancer Maternal Grandmother         liver cancer     Diabetes No family hx of      " Hypertension No family hx of        ROS: 10 point relevant ROS neg other than the symptoms noted above in the HPI.    Physical Exam    Resp 16   Wt 79.4 kg (175 lb)   BMI 25.11 kg/m      General:  no acute distress  HEENT:  normocephalic/atraumatic  Pulmonary:  no respiratory distress    Neurologic  Mental Status:  alert, oriented x 3, follows commands, speech clear and fluent, naming and repetition normal  Cranial Nerves:  EOMI with normal smooth pursuit, facial movements symmetric, hearing not formally tested but intact to conversation, no dysarthria, shoulder shrug equal bilaterally, tongue protrusion midline  Motor:  no abnormal movements, able to move all limbs antigravity spontaneously with no signs of hemiparesis observed, no pronator drift  Reflexes:  2/4 in bilateral upper and lower extremities  Sensory:  Mild decreased sensation to RUE and RLE.  Coordination:  normal finger-to-nose and heel-to-shin bilaterally without dysmetria, rapid alternating movements symmetric  Station/Gait:  Normal    Neuroimaging: as per HPI.    Labs:    Recent Labs   Lab Test 07/29/21  2349 07/09/21  0430 07/07/21  1241   INR 1.04 1.00 1.05        Recent Labs   Lab Test 07/06/21  1204   CHOL 127   HDL 58   LDL 50   TRIG 93       Recent Labs   Lab Test 07/06/21  1204   A1C 5.2       Recent Labs   Lab Test 07/06/21  1746 07/06/21  1204   TROPI <0.015 <0.015           Again, thank you for allowing me to participate in the care of your patient.      Sincerely,    Aba Patricia MD

## 2021-08-13 ENCOUNTER — HOSPITAL ENCOUNTER (OUTPATIENT)
Facility: CLINIC | Age: 21
End: 2021-08-13
Attending: INTERNAL MEDICINE | Admitting: INTERNAL MEDICINE
Payer: COMMERCIAL

## 2021-08-13 ENCOUNTER — OFFICE VISIT (OUTPATIENT)
Dept: CARDIOLOGY | Facility: CLINIC | Age: 21
End: 2021-08-13
Attending: INTERNAL MEDICINE
Payer: COMMERCIAL

## 2021-08-13 VITALS
BODY MASS INDEX: 24.49 KG/M2 | SYSTOLIC BLOOD PRESSURE: 138 MMHG | OXYGEN SATURATION: 97 % | WEIGHT: 170.7 LBS | HEART RATE: 66 BPM | DIASTOLIC BLOOD PRESSURE: 79 MMHG

## 2021-08-13 DIAGNOSIS — Q21.12 PFO (PATENT FORAMEN OVALE): ICD-10-CM

## 2021-08-13 PROCEDURE — 99203 OFFICE O/P NEW LOW 30 MIN: CPT | Performed by: INTERNAL MEDICINE

## 2021-08-13 PROCEDURE — G0463 HOSPITAL OUTPT CLINIC VISIT: HCPCS

## 2021-08-13 RX ORDER — SODIUM CHLORIDE 9 MG/ML
INJECTION, SOLUTION INTRAVENOUS CONTINUOUS
Status: CANCELLED | OUTPATIENT
Start: 2021-08-13

## 2021-08-13 RX ORDER — CEFAZOLIN SODIUM 2 G/50ML
2 SOLUTION INTRAVENOUS
Status: CANCELLED | OUTPATIENT
Start: 2021-08-13

## 2021-08-13 RX ORDER — LIDOCAINE 40 MG/G
CREAM TOPICAL
Status: CANCELLED | OUTPATIENT
Start: 2021-08-13

## 2021-08-13 ASSESSMENT — PAIN SCALES - GENERAL: PAINLEVEL: NO PAIN (0)

## 2021-08-13 NOTE — NURSING NOTE
Chief Complaint   Patient presents with     New Patient     20 year old male with history of PFO and stroke presenting for evaluation.      Vitals were taken and medications reconciled.    Dong Price, EMT  9:26 AM

## 2021-08-13 NOTE — PROGRESS NOTES
CARDIOLOGY CONSULTATION:    Ravinder Bardales is a 20 year old male with no significant past medical history.  He was in his normal state of health until 7/3/21 when he was having a bowel movement and suddenly was unable to use his right arm.  This was followed by word finding difficulty where he was able to understand everyone he just was unable to get the words out and had stuttering expressive speech.  He called his father who stated that he could tell his speech was off.  He also had some right eye visual changes. The timing of the symptoms lasted approximately 20 minutes total with some prolonged numbness in the right arm.  Sunday 7/4 patient was noted to have a left arm numbness and weakness which resolved and finally 7/5 he was riding his motorcycle and had recurrent right eye visual changes.  He decided to come to the ED 7/6.  MRI of the head showed scattered cortical areas of restricted diffusion in the left cerebral hemisphere. These are located in the left frontal lobe, left parietal lobe, and left occipital lobe. In particular, there are were regions of restricted diffusion in the left postcentral gyrus in the  vicinity of the hand knob.  These L MCA stroke findings were consistent with cardio-embolism.  He did not get TPA or clot retrieval as he was outside the window and improving and there was no clot to retrieve.  He was seen by neurology and hyper-coag work up was initiated and negative. 30 day monitor is still pending.  He received a TTE and PHUONG and I have reviewed those images personally.  He has a very small PFO with positive bubble study at rest and with valsalva on TTE and on PHUONG the bubble study was mildly positive at rest and no bubbles were seen crossing with valsalva.       He denies stimulant or drug use. He does not use testosterone. He is very active and has not had any chest pain with activity but interestingly over the 2 weeks prior to his event he noted a tightness in his chest that  occurred randomly and he thought may be his pectoralis muscle. This resolved after the event 7/3.  He has no family history of clotting disorder or early CAD in first degree relatives.  No hypertension.  Nothing on monitoring in the hospital.  He denies any injuries. No swelling in one extremity or another. No un-intentional weight loss. He had MRA of the iliacs that showed no clot. He had several episodes of recurrent symptoms in the same distribution as his original event with unchanged imaging.     He had follow up with Dr. Patricia and he recommended PFO closure. He is here for follow up.     PAST MEDICAL HISTORY:  No past medical history on file.    CURRENT MEDICATIONS:  Current Outpatient Medications   Medication Sig Dispense Refill     acetaminophen (TYLENOL) 325 MG tablet Take 2 tablets (650 mg) by mouth every 4 hours as needed for mild pain       aspirin (ASA) 81 MG EC tablet Take 1 tablet (81 mg) by mouth daily 90 tablet 0       PAST SURGICAL HISTORY:  Past Surgical History:   Procedure Laterality Date     APPENDECTOMY       ARTHROSCOPY KNEE Left 11/6/2015    Procedure: ARTHROSCOPY KNEE;  Surgeon: Koko Babb MD;  [facility]: [facility]     ARTHROSCOPY KNEE WITH FIXATION OF OSTEOCHONDRAL DISSECANS Left 3/17/2015    Procedure: ARTHROSCOPY KNEE WITH FIXATION OSTEOCHONDRITIS DESSICANS;  Surgeon: Koko Babb MD;  [facility]: [facility]     IR CAROTID CEREBRAL ANGIOGRAM BILATERAL  7/13/2021     NO HISTORY OF SURGERY       ORTHOPEDIC SURGERY       REMOVE HARDWARE KNEE Left 11/6/2015    Procedure: REMOVE HARDWARE KNEE;  Surgeon: Koko Babb MD;  Location: [facility]       ALLERGIES  Patient has no known allergies.    FAMILY HX:  Family History   Problem Relation Age of Onset     Cancer Maternal Grandmother         liver cancer     Diabetes No family hx of      Hypertension No family hx of        SOCIAL HX:  Social History     Socioeconomic History     Marital status: Single     Spouse  name: None     Number of children: None     Years of education: None     Highest education level: None   Occupational History     None   Tobacco Use     Smoking status: Never Smoker     Smokeless tobacco: Never Used   Substance and Sexual Activity     Alcohol use: Yes     Comment: occassionally     Drug use: No     Sexual activity: Not Currently     Partners: Female     Birth control/protection: Pill, Condom   Other Topics Concern     Parent/sibling w/ CABG, MI or angioplasty before 65F 55M? Not Asked   Social History Narrative     None     Social Determinants of Health     Financial Resource Strain:      Difficulty of Paying Living Expenses:    Food Insecurity:      Worried About Running Out of Food in the Last Year:      Ran Out of Food in the Last Year:    Transportation Needs:      Lack of Transportation (Medical):      Lack of Transportation (Non-Medical):    Physical Activity:      Days of Exercise per Week:      Minutes of Exercise per Session:    Stress:      Feeling of Stress :    Social Connections:      Frequency of Communication with Friends and Family:      Frequency of Social Gatherings with Friends and Family:      Attends Je[employer] witness Services:      Active Member of Clubs or Organizations:      Attends Club or Organization Meetings:      Marital Status:    Intimate Partner Violence:      Fear of Current or Ex-Partner:      Emotionally Abused:      Physically Abused:      Sexually Abused:        ROS:  Constitutional: No fever, chills, or sweats. No weight gain/loss.   ENT: No visual disturbance, ear ache, epistaxis, sore throat.   Allergies/Immunologic: Negative.   Respiratory: No cough, hemoptysis.   Cardiovascular: As per HPI.   GI: No nausea, vomiting, hematemesis, melena, or hematochezia.   : No urinary frequency, dysuria, or hematuria.   Integument: Negative.   Psychiatric: Negative.   Neuro: Negative.   Endocrinology: Negative.   Musculoskeletal: No myalgia.    VITAL SIGNS:  /79 (BP  Location: Right arm, Patient Position: Chair, Cuff Size: Adult Large)   Pulse 66   Wt 77.4 kg (170 lb 11.2 oz)   SpO2 97%   BMI 24.49 kg/m    Body mass index is 24.49 kg/m .  Wt Readings from Last 2 Encounters:   08/13/21 77.4 kg (170 lb 11.2 oz)   08/11/21 79.4 kg (175 lb)       PHYSICAL EXAM  Ravinder Bardales IS A 20 year old male.in no acute distress.  HEENT: Unremarkable.  Neck: JVP normal.  Carotids +4/4 bilaterally without bruits.  Lungs: CTA.  Cor: RRR. Normal S1 and S2.  No murmur, rub, or gallop.  PMI in Lf 5th ICS.  Abd: Soft, nontender, nondistended.  NABS.  No pulsatile mass.  Extremities: No C/C/E.  Pulses +4/4 symmetric in upper and lower extremities.  Neuro: Grossly intact.    LABS    Lab Results   Component Value Date    WBC 8.3 07/29/2021    WBC 5.9 07/10/2021     Lab Results   Component Value Date    RBC 4.50 07/29/2021    RBC 5.26 07/10/2021     Lab Results   Component Value Date    HGB 13.8 07/29/2021    HGB 15.8 07/10/2021     Lab Results   Component Value Date    HCT 40.0 07/29/2021    HCT 48.2 07/10/2021     No components found for: MCT  Lab Results   Component Value Date    MCV 89 07/29/2021    MCV 92 07/10/2021     Lab Results   Component Value Date    MCH 30.7 07/29/2021    MCH 30.0 07/10/2021     Lab Results   Component Value Date    MCHC 34.5 07/29/2021    MCHC 32.8 07/10/2021     Lab Results   Component Value Date    RDW 12.5 07/29/2021    RDW 13.0 07/10/2021     Lab Results   Component Value Date     07/29/2021     07/10/2021      Recent Labs   Lab Test 07/29/21  2349 07/11/21  0704    135   POTASSIUM 3.6 4.1   CHLORIDE 106 104   CO2 28 26   ANIONGAP 3 5   GLC 91 101*   BUN 32* 17   CR 1.24 1.21   MAYA 9.0 9.3     Recent Labs   Lab Test 07/06/21  1204   CHOL 127   HDL 58   LDL 50   TRIG 93   NHDL 69        #1 L MCA probable cardio-embolic stroke  #2 PFO     It was a pleasure to be involved in the care of Ravinder.  I reviewed his history and symptoms in detail and  testing as out-lined. His history is very compelling for cardio-embolic event with possible clot in transit for several weeks prior with the chest pressure he describes that resolved after the neurologic event that occurred during valsalva.  He does have a PFO, albeit small.  There are no other factors that we have found that could have contributed.  Hypercoagulable workup is negative. Will get results of 30 day monitor before planned PFO closure 8/27, which we discussed today.    Risks and benefits were discussed.  Plan 3 months of plavix following and ASA indefinitely.    He and is his dad understand and in are in agreement with the plan.   It was a pleasure to be involved in his care. Please do not hesitate to contact me with questions.     GERRY Hu MD   42 minutes face-face, documentation and review of records on day of visit

## 2021-08-13 NOTE — LETTER
8/13/2021      RE: Ravinder Bardales  [address] [facility] [address]       Dear Colleague,    Thank you for the opportunity to participate in the care of your patient, Ravinder Bardales, at the [facility] [place] at [facility]. Please see a copy of my visit note below.    CARDIOLOGY CONSULTATION:    Ravinder Bardales is a 20 year old male with no significant past medical history.  He was in his normal state of health until 7/3/21 when he was having a bowel movement and suddenly was unable to use his right arm.  This was followed by word finding difficulty where he was able to understand everyone he just was unable to get the words out and had stuttering expressive speech.  He called his father who stated that he could tell his speech was off.  He also had some right eye visual changes. The timing of the symptoms lasted approximately 20 minutes total with some prolonged numbness in the right arm.  Sunday 7/4 patient was noted to have a left arm numbness and weakness which resolved and finally 7/5 he was riding his motorcycle and had recurrent right eye visual changes.  He decided to come to the ED 7/6.  MRI of the head showed scattered cortical areas of restricted diffusion in the left cerebral hemisphere. These are located in the left frontal lobe, left parietal lobe, and left occipital lobe. In particular, there are were regions of restricted diffusion in the left postcentral gyrus in the  vicinity of the hand knob.  These L MCA stroke findings were consistent with cardio-embolism.  He did not get TPA or clot retrieval as he was outside the window and improving and there was no clot to retrieve.  He was seen by neurology and hyper-coag work up was initiated and negative. 30 day monitor is still pending.  He received a TTE and PHUONG and I have reviewed those images personally.  He has a very small PFO with positive bubble study at rest and with valsalva  on TTE and on PHUONG the bubble study was mildly positive at rest and no bubbles were seen crossing with valsalva.       He denies stimulant or drug use. He does not use testosterone. He is very active and has not had any chest pain with activity but interestingly over the 2 weeks prior to his event he noted a tightness in his chest that occurred randomly and he thought may be his pectoralis muscle. This resolved after the event 7/3.  He has no family history of clotting disorder or early CAD in first degree relatives.  No hypertension.  Nothing on monitoring in the hospital.  He denies any injuries. No swelling in one extremity or another. No un-intentional weight loss. He had MRA of the iliacs that showed no clot. He had several episodes of recurrent symptoms in the same distribution as his original event with unchanged imaging.     He had follow up with Dr. Patricia and he recommended PFO closure. He is here for follow up.     PAST MEDICAL HISTORY:  No past medical history on file.    CURRENT MEDICATIONS:  Current Outpatient Medications   Medication Sig Dispense Refill     acetaminophen (TYLENOL) 325 MG tablet Take 2 tablets (650 mg) by mouth every 4 hours as needed for mild pain       aspirin (ASA) 81 MG EC tablet Take 1 tablet (81 mg) by mouth daily 90 tablet 0       PAST SURGICAL HISTORY:  Past Surgical History:   Procedure Laterality Date     APPENDECTOMY       ARTHROSCOPY KNEE Left 11/6/2015    Procedure: ARTHROSCOPY KNEE;  Surgeon: Koko Babb MD;  [facility]     ARTHROSCOPY KNEE WITH FIXATION OF OSTEOCHONDRAL DISSECANS Left 3/17/2015    Procedure: ARTHROSCOPY KNEE WITH FIXATION OSTEOCHONDRITIS DESSICANS;  Surgeon: Koko Babb MD;  [facility]     IR CAROTID CEREBRAL ANGIOGRAM BILATERAL  7/13/2021     NO HISTORY OF SURGERY       ORTHOPEDIC SURGERY       REMOVE HARDWARE KNEE Left 11/6/2015    Procedure: REMOVE HARDWARE KNEE;  Surgeon: Koko Babb MD;   [facility]       ALLERGIES  Patient has no known allergies.    FAMILY HX:  Family History   Problem Relation Age of Onset     Cancer Maternal Grandmother         liver cancer     Diabetes No family hx of      Hypertension No family hx of        SOCIAL HX:  Social History     Socioeconomic History     Marital status: Single     Spouse name: None     Number of children: None     Years of education: None     Highest education level: None   Occupational History     None   Tobacco Use     Smoking status: Never Smoker     Smokeless tobacco: Never Used   Substance and Sexual Activity     Alcohol use: Yes     Comment: occassionally     Drug use: No     Sexual activity: Not Currently     Partners: Female     Birth control/protection: Pill, Condom   Other Topics Concern     Parent/sibling w/ CABG, MI or angioplasty before 65F 55M? Not Asked   Social History Narrative     None     Social Determinants of Health     Financial Resource Strain:      Difficulty of Paying Living Expenses:    Food Insecurity:      Worried About Running Out of Food in the Last Year:      Ran Out of Food in the Last Year:    Transportation Needs:      Lack of Transportation (Medical):      Lack of Transportation (Non-Medical):    Physical Activity:      Days of Exercise per Week:      Minutes of Exercise per Session:    Stress:      Feeling of Stress :    Social Connections:      Frequency of Communication with Friends and Family:      Frequency of Social Gatherings with Friends and Family:      Attends Taoist Services:      Active Member of Clubs or Organizations:      Attends Club or Organization Meetings:      Marital Status:    Intimate Partner Violence:      Fear of Current or Ex-Partner:      Emotionally Abused:      Physically Abused:      Sexually Abused:        ROS:  Constitutional: No fever, chills, or sweats. No weight gain/loss.   ENT: No visual disturbance, ear ache, epistaxis, sore throat.   Allergies/Immunologic: Negative.    Respiratory: No cough, hemoptysis.   Cardiovascular: As per HPI.   GI: No nausea, vomiting, hematemesis, melena, or hematochezia.   : No urinary frequency, dysuria, or hematuria.   Integument: Negative.   Psychiatric: Negative.   Neuro: Negative.   Endocrinology: Negative.   Musculoskeletal: No myalgia.    VITAL SIGNS:  /79 (BP Location: Right arm, Patient Position: Chair, Cuff Size: Adult Large)   Pulse 66   Wt 77.4 kg (170 lb 11.2 oz)   SpO2 97%   BMI 24.49 kg/m    Body mass index is 24.49 kg/m .  Wt Readings from Last 2 Encounters:   08/13/21 77.4 kg (170 lb 11.2 oz)   08/11/21 79.4 kg (175 lb)       PHYSICAL EXAM  Ravinder Bardales IS A 20 year old male.in no acute distress.  HEENT: Unremarkable.  Neck: JVP normal.  Carotids +4/4 bilaterally without bruits.  Lungs: CTA.  Cor: RRR. Normal S1 and S2.  No murmur, rub, or gallop.  PMI in Lf 5th ICS.  Abd: Soft, nontender, nondistended.  NABS.  No pulsatile mass.  Extremities: No C/C/E.  Pulses +4/4 symmetric in upper and lower extremities.  Neuro: Grossly intact.    LABS    Lab Results   Component Value Date    WBC 8.3 07/29/2021    WBC 5.9 07/10/2021     Lab Results   Component Value Date    RBC 4.50 07/29/2021    RBC 5.26 07/10/2021     Lab Results   Component Value Date    HGB 13.8 07/29/2021    HGB 15.8 07/10/2021     Lab Results   Component Value Date    HCT 40.0 07/29/2021    HCT 48.2 07/10/2021     No components found for: MCT  Lab Results   Component Value Date    MCV 89 07/29/2021    MCV 92 07/10/2021     Lab Results   Component Value Date    MCH 30.7 07/29/2021    MCH 30.0 07/10/2021     Lab Results   Component Value Date    MCHC 34.5 07/29/2021    MCHC 32.8 07/10/2021     Lab Results   Component Value Date    RDW 12.5 07/29/2021    RDW 13.0 07/10/2021     Lab Results   Component Value Date     07/29/2021     07/10/2021      Recent Labs   Lab Test 07/29/21  2349 07/11/21  0704    135   POTASSIUM 3.6 4.1   CHLORIDE 106 104    CO2 28 26   ANIONGAP 3 5   GLC 91 101*   BUN 32* 17   CR 1.24 1.21   MAYA 9.0 9.3     Recent Labs   Lab Test 07/06/21  1204   CHOL 127   HDL 58   LDL 50   TRIG 93   NHDL 69        #1 L MCA probable cardio-embolic stroke  #2 PFO     It was a pleasure to be involved in the care of Ravinder.  I reviewed his history and symptoms in detail and testing as out-lined. His history is very compelling for cardio-embolic event with possible clot in transit for several weeks prior with the chest pressure he describes that resolved after the neurologic event that occurred during valsalva.  He does have a PFO, albeit small.  There are no other factors that we have found that could have contributed.  Hypercoagulable workup is negative. Will get results of 30 day monitor before planned PFO closure 8/27, which we discussed today.    Risks and benefits were discussed.  Plan 3 months of plavix following and ASA indefinitely.    He and is his dad understand and in are in agreement with the plan.   It was a pleasure to be involved in his care. Please do not hesitate to contact me with questions.     GERRY Hu MD   42 minutes face-face, documentation and review of records on day of visit

## 2021-08-13 NOTE — PATIENT INSTRUCTIONS
You were seen today in the Adult Congenital and Cardiovascular Genetics Clinic at the [facility].    Cardiology Providers you saw during your visit:  Dr Reji Hu    Diagnosis:  PFO    Results:  No testing at this time.     Recommendations:    1.  Continue to eat a heart healthy, low salt diet.  2.  Continue to get 20-30 minutes of aerobic activity, 4-5 days per week.  Examples of aerobic activity include walking, running, swimming, cycling, etc.  3.  Continue to observe good oral hygiene, with regular dental visits.    4.   Will schedule you for your PFO closure. This will be done at the [facility], [address] [place] [address], [place] [address] on Friday August 27, 2021. You are to check in at the [facility] at 7 AM. Please enter the main entrance of the hospital and walk past the coffee shop and gift shop on the right side of the salazar and the bank of elevators. The [facility] Waiting room is located on the left side of the salazar.     Pre procedure instructions:  1. Please make arrangements to have a  as you will not be allowed to drive following the procedure.  2. Do not eat or drink after midnight the day of your procedure.  3. You may take your usual morning medications with a sip of water the morning of your procedure.  4. Make arrangements to have someone stay with you the night after your procedure.  5. Please pack an overnight bag just in case you spend the night.   6. You will need to have a negative COVID test completed within 96 hours of your procedure date.     SBE prophylaxis:   Yes____  No__x__    Lifelong Bacterial Endocarditis Prophylaxis:  YES____  NO____    If YES is checked, follow the recommendations outlined below:   1. Take antibiotic(s) prior to recommended dental procedures and procedures on the respiratory tract or with infected skin, muscle or bones. SBE prophylaxis is not needed for routine GI and  procedures (ie. Colonoscopy or  vaginal delivery)   2. Observe good oral hygiene daily, as advised by your dentist. Get regular professional dental care.   3. Keep cuts clean.   4. Infections should be treated promptly.   5. Symptoms of Infective Endocarditis could include: fever lasting more than 4-5 days or a recurrent fever that initially resolves but returns within 1-2 days)     Exercise restrictions:   Yes____  No__x__         If yes, list restrictions:  Must be allowed to rest if fatigued or SOB      Work restrictions:  Yes____  No__x__         If yes, list restrictions:    FASTING CHOLESTEROL was checked in the last 5 years YES___  NO_x__   Continue to eat a heart healthy, low salt diet.         ____ Fasting lipid panel order today         ____ No changes in medications          ____ I recommend the following changes in your cholesterol medications.:          ____ Please follow up for cholesterol screening at your primary care physician      Follow-up:  Follow up with  March one month following PFO closure with an echocardiogram.       For after hours urgent needs, call [phone] and ask to speak to the Adult Congenital Physician on call.  Mention Job Code 0401.    For emergencies call 911.    For any scheduling needs, please call [facility] Procedure , at [phone]  Thank you for your visit today!  If you have questions or concerns about today's visit, please call me.    Kareem Salinas, RN, BSN  Cardiology Care Coordinator  [facility]  [phone]    [address] [place] [address]  Mail Code [id number]  [place] [address]

## 2021-08-13 NOTE — NURSING NOTE
Cardiac Testing: Patient given instructions regarding  echocardiogram . Discussed purpose, preparation, procedure and when to expect results reported back to the patient. Patient demonstrated understanding of this information and agreed to call with further questions or concerns.    Med Reconcile: Reviewed and verified all current medications with the patient. The updated medication list was printed and given to the patient.    Return Appointment: Follow up with Dr Hu one month with echo after PFO closure. Patient given instructions regarding scheduling next clinic visit. Patient demonstrated understanding of this information and agreed to call with further questions or concerns.    Patient stated he understood all health information given and agreed to call with further questions or concerns.    Kareem Salinas, RN  Cardiology RN Care Coordinator  [phone]

## 2021-08-15 DIAGNOSIS — Z11.59 ENCOUNTER FOR SCREENING FOR OTHER VIRAL DISEASES: ICD-10-CM

## 2021-08-23 ENCOUNTER — LAB (OUTPATIENT)
Dept: LAB | Facility: CLINIC | Age: 21
End: 2021-08-23
Payer: COMMERCIAL

## 2021-08-23 DIAGNOSIS — Q21.12 PFO (PATENT FORAMEN OVALE): ICD-10-CM

## 2021-08-23 DIAGNOSIS — Q21.12 PFO (PATENT FORAMEN OVALE): Primary | ICD-10-CM

## 2021-08-23 PROCEDURE — U0005 INFEC AGEN DETEC AMPLI PROBE: HCPCS

## 2021-08-23 PROCEDURE — U0003 INFECTIOUS AGENT DETECTION BY NUCLEIC ACID (DNA OR RNA); SEVERE ACUTE RESPIRATORY SYNDROME CORONAVIRUS 2 (SARS-COV-2) (CORONAVIRUS DISEASE [COVID-19]), AMPLIFIED PROBE TECHNIQUE, MAKING USE OF HIGH THROUGHPUT TECHNOLOGIES AS DESCRIBED BY CMS-2020-01-R: HCPCS

## 2021-08-24 ENCOUNTER — ANESTHESIA EVENT (OUTPATIENT)
Dept: CARDIOLOGY | Facility: CLINIC | Age: 21
End: 2021-08-24
Payer: COMMERCIAL

## 2021-08-24 LAB — SARS-COV-2 RNA RESP QL NAA+PROBE: NEGATIVE

## 2021-08-24 RX ORDER — FLUTICASONE PROPIONATE 50 MCG
1 SPRAY, SUSPENSION (ML) NASAL DAILY PRN
COMMUNITY

## 2021-08-24 RX ORDER — CLOPIDOGREL BISULFATE 75 MG/1
75 TABLET ORAL EVERY MORNING
COMMUNITY

## 2021-08-24 RX ORDER — ACETAMINOPHEN 500 MG
500-1000 TABLET ORAL EVERY 6 HOURS PRN
COMMUNITY

## 2021-08-24 NOTE — ANESTHESIA PREPROCEDURE EVALUATION
Anesthesia Pre-Procedure Evaluation    Patient: Ravinder Bardales   MRN: [id number] : 2000        Preoperative Diagnosis: other   Procedure : Procedure(s):  PFO Closure     No past medical history on file.   Past Surgical History:   Procedure Laterality Date     APPENDECTOMY       ARTHROSCOPY KNEE Left 2015    Procedure: ARTHROSCOPY KNEE;  Surgeon: Koko Babb MD;  [facility]     ARTHROSCOPY KNEE WITH FIXATION OF OSTEOCHONDRAL DISSECANS Left 3/17/2015    Procedure: ARTHROSCOPY KNEE WITH FIXATION OSTEOCHONDRITIS DESSICANS;  Surgeon: Koko Babb MD;  [facility]     IR CAROTID CEREBRAL ANGIOGRAM BILATERAL  2021     NO HISTORY OF SURGERY       ORTHOPEDIC SURGERY       REMOVE HARDWARE KNEE Left 2015    Procedure: REMOVE HARDWARE KNEE;  Surgeon: Koko Babb MD;  [facility]      No Known Allergies   Social History     Tobacco Use     Smoking status: Never Smoker     Smokeless tobacco: Never Used   Substance Use Topics     Alcohol use: Yes     Comment: occassionally      Wt Readings from Last 1 Encounters:   21 77.4 kg (170 lb 11.2 oz)        Anesthesia Evaluation            ROS/MED HX  ENT/Pulmonary:  - neg pulmonary ROS     Neurologic: Comment: CVA while having BM.  Workup revealed PFO.    (+) CVA, date: 7-3-21, with deficits, - R arm numbness and word finding problems. Visual field deficits. .     Cardiovascular:     (+) -----congenital heart disease PFO. Previous cardiac testing   Echo: Date:  Results:  Interpretation Summary     There is a small patent foramen ovale (PFO) present. There is mild bi-  directional shunting present, with Left to Right shunting on color Doppler,  and Right to Left shunting with a mildly positive bubble study with the  Valsalva maneuver only. Bubble study was negative at rest. Right atrial aspect  defect measures 0.1 cm. Left atrial aspect defect measures 0.1 cm. Tunnel  length measures 1.2 cm.  Aortic valve rim (aortic short-axis) measures 0.9 cm.  AV valve rim (4-chamber) measures 1.1 cm. SVC rim (bi-caval) measures 1.3 cm.  Interatrial septum is not aneurysmal, there are no fenestrations.  Sweep image obtained (labeled SWEEP).     Left ventricular size, global systolic function, and wall motion are normal,  estimated LVEF 55-60%.  Right ventricular global function is normal.  No significant valvular abnormalities.  The left atrial appendage was well visualized and free of thrombus.  Stress Test: Date: Results:    ECG Reviewed: Date: Results:    Cath: Date: Results:      METS/Exercise Tolerance:     Hematologic:  - neg hematologic  ROS     Musculoskeletal:  - neg musculoskeletal ROS     GI/Hepatic:  - neg GI/hepatic ROS     Renal/Genitourinary:  - neg Renal ROS     Endo:  - neg endo ROS     Psychiatric/Substance Use:  - neg psychiatric ROS     Infectious Disease:  - neg infectious disease ROS     Malignancy:  - neg malignancy ROS     Other:  - neg other ROS          Physical Exam    Airway        Mallampati: II   TM distance: > 3 FB   Neck ROM: full   Mouth opening: > 3 cm    Respiratory Devices and Support         Dental  no notable dental history         Cardiovascular   cardiovascular exam normal       Rhythm and rate: regular     Pulmonary   pulmonary exam normal        breath sounds clear to auscultation           OUTSIDE LABS:  CBC:   Lab Results   Component Value Date    WBC 8.3 07/29/2021    WBC 5.9 07/10/2021    HGB 13.8 07/29/2021    HGB 15.8 07/10/2021    HCT 40.0 07/29/2021    HCT 48.2 07/10/2021     07/29/2021     07/10/2021     BMP:   Lab Results   Component Value Date     07/29/2021     07/11/2021    POTASSIUM 3.6 07/29/2021    POTASSIUM 4.1 07/11/2021    CHLORIDE 106 07/29/2021    CHLORIDE 104 07/11/2021    CO2 28 07/29/2021    CO2 26 07/11/2021    BUN 32 (H) 07/29/2021    BUN 17 07/11/2021    CR 1.24 07/29/2021    CR 1.21 07/11/2021    GLC 91 07/29/2021    GLC  101 (H) 07/11/2021     COAGS:   Lab Results   Component Value Date    PTT 28 07/29/2021    INR 1.04 07/29/2021     POC:   Lab Results   Component Value Date    BGM 83 07/10/2021     HEPATIC:   Lab Results   Component Value Date    ALBUMIN 4.1 07/29/2021    PROTTOTAL 7.1 07/29/2021    ALT 29 07/29/2021    AST 30 07/29/2021    ALKPHOS 71 07/29/2021    BILITOTAL 1.6 (H) 07/29/2021     OTHER:   Lab Results   Component Value Date    LACT 0.7 07/11/2021    A1C 5.2 07/06/2021    MAYA 9.0 07/29/2021    TSH 1.36 07/10/2021    CRP <2.9 07/10/2021    SED 4 07/10/2021       Anesthesia Plan    ASA Status:  2   NPO Status:  NPO Appropriate    Anesthesia Type: General.     - Airway: ETT   Induction: Intravenous, Propofol.   Maintenance: Balanced.   Techniques and Equipment:     Lines/Monitors: IV line filter.     Consents    Anesthesia Plan(s) and associated risks, benefits, and realistic alternatives discussed. Questions answered and patient/representative(s) expressed understanding.     - Discussed with:  Patient      - Extended Intubation/Ventilatory Support Discussed: No.      - Patient is DNR/DNI Status: No    Use of blood products discussed: No .     Postoperative Care    Pain management: Multi-modal analgesia.   PONV prophylaxis: Ondansetron (or other 5HT-3), Dexamethasone or Solumedrol     Comments:    Patient is counseled on the anesthesia plan and relevant anesthesia procedures (vascular lines/blocks/PHUONG/airway devices) including all risks and benefits. All patient questions were answered.     **PATIENT WILL NEED AN IV LINE FILTER IN PLACE TO PREVENT IV AIR AND PARADOXICAL EMBOLUS.**    Per cardiologist, case will be done with conscious sedation and we will not be involved.             Herminio Ortiz MD

## 2021-08-24 NOTE — PROGRESS NOTES
PTA medications updated by Medication Scribe prior to surgery via phone call with patient (last doses completed by Nurse)     Medication history sources: Patient, Surescripts and H&P  In the past week, patient estimated taking medication this percent of the time: Greater than 90%  Adherence assessment: N/A Not Observed    Significant changes made to the medication list:  None      Additional medication history information:   Patient brought own home meds: Flonase Nasal Mayo    Medication reconciliation completed by provider prior to medication history? No    Time spent in this activity: 20 minutes    The information provided in this note is only as accurate as the sources available at the time of update(s)      Prior to Admission medications    Medication Sig Last Dose Taking? Auth Provider   acetaminophen (TYLENOL) 500 MG tablet Take 500-1,000 mg by mouth every 6 hours as needed for mild pain MORE THAN A WEEK at PRN Yes Reported, Patient   aspirin (ASA) 81 MG EC tablet Take 1 tablet (81 mg) by mouth daily 8/24/2021 at AM Yes Homero Yi MD   clopidogrel (PLAVIX) 75 MG tablet Take 75 mg by mouth every morning 8/11/2021 at AM Yes Reported, Patient   fluticasone (FLONASE) 50 MCG/ACT nasal spray Spray 1 spray into both nostrils daily as needed for rhinitis or allergies  at PRN Yes Reported, Patient       Rob Varela CPhT  Medication Scribe  [facility]

## 2021-08-25 ENCOUNTER — APPOINTMENT (OUTPATIENT)
Dept: CARDIOLOGY | Facility: CLINIC | Age: 21
End: 2021-08-25
Attending: STUDENT IN AN ORGANIZED HEALTH CARE EDUCATION/TRAINING PROGRAM
Payer: COMMERCIAL

## 2021-08-25 ENCOUNTER — ANESTHESIA (OUTPATIENT)
Dept: CARDIOLOGY | Facility: CLINIC | Age: 21
End: 2021-08-25
Payer: COMMERCIAL

## 2021-08-25 ENCOUNTER — HOSPITAL ENCOUNTER (OUTPATIENT)
Facility: CLINIC | Age: 21
LOS: 1 days | Discharge: HOME | End: 2021-08-25
Admitting: INTERNAL MEDICINE
Payer: COMMERCIAL

## 2021-08-25 VITALS
HEIGHT: 70 IN | RESPIRATION RATE: 16 BRPM | HEART RATE: 71 BPM | OXYGEN SATURATION: 99 % | BODY MASS INDEX: 25.37 KG/M2 | SYSTOLIC BLOOD PRESSURE: 125 MMHG | DIASTOLIC BLOOD PRESSURE: 66 MMHG | WEIGHT: 177.2 LBS | TEMPERATURE: 98.5 F

## 2021-08-25 DIAGNOSIS — Q21.12 PFO (PATENT FORAMEN OVALE): ICD-10-CM

## 2021-08-25 PROBLEM — Z98.890 STATUS POST CORONARY ANGIOGRAM: Status: ACTIVE | Noted: 2021-08-25

## 2021-08-25 LAB
ABO/RH(D): NORMAL
ACT BLD: 219 SECONDS (ref 74–150)
ANION GAP SERPL CALCULATED.3IONS-SCNC: 1 MMOL/L (ref 3–14)
ANTIBODY SCREEN: NEGATIVE
BUN SERPL-MCNC: 15 MG/DL (ref 7–30)
CALCIUM SERPL-MCNC: 9 MG/DL (ref 8.5–10.1)
CHLORIDE BLD-SCNC: 105 MMOL/L (ref 94–109)
CO2 SERPL-SCNC: 32 MMOL/L (ref 20–32)
CREAT SERPL-MCNC: 1.17 MG/DL (ref 0.66–1.25)
ERYTHROCYTE [DISTWIDTH] IN BLOOD BY AUTOMATED COUNT: 12.9 % (ref 10–15)
GFR SERPL CREATININE-BSD FRML MDRD: 88 ML/MIN/1.73M2
GLUCOSE BLD-MCNC: 100 MG/DL (ref 70–99)
HCT VFR BLD AUTO: 45.2 % (ref 40–53)
HGB BLD-MCNC: 15.1 G/DL (ref 13.3–17.7)
LVEF ECHO: NORMAL
MCH RBC QN AUTO: 30.4 PG (ref 26.5–33)
MCHC RBC AUTO-ENTMCNC: 33.4 G/DL (ref 31.5–36.5)
MCV RBC AUTO: 91 FL (ref 78–100)
PLATELET # BLD AUTO: 232 10E3/UL (ref 150–450)
POTASSIUM BLD-SCNC: 3.9 MMOL/L (ref 3.4–5.3)
RBC # BLD AUTO: 4.97 10E6/UL (ref 4.4–5.9)
SODIUM SERPL-SCNC: 138 MMOL/L (ref 133–144)
SPECIMEN EXPIRATION DATE: NORMAL
WBC # BLD AUTO: 5.9 10E3/UL (ref 4–11)

## 2021-08-25 PROCEDURE — 250N000009 HC RX 250: Performed by: INTERNAL MEDICINE

## 2021-08-25 PROCEDURE — 93662 INTRACARDIAC ECG (ICE): CPT | Performed by: INTERNAL MEDICINE

## 2021-08-25 PROCEDURE — 85027 COMPLETE CBC AUTOMATED: CPT | Performed by: INTERNAL MEDICINE

## 2021-08-25 PROCEDURE — 99152 MOD SED SAME PHYS/QHP 5/>YRS: CPT | Performed by: INTERNAL MEDICINE

## 2021-08-25 PROCEDURE — 93325 DOPPLER ECHO COLOR FLOW MAPG: CPT | Mod: 26 | Performed by: INTERNAL MEDICINE

## 2021-08-25 PROCEDURE — 250N000011 HC RX IP 250 OP 636: Performed by: INTERNAL MEDICINE

## 2021-08-25 PROCEDURE — C1887 CATHETER, GUIDING: HCPCS | Performed by: INTERNAL MEDICINE

## 2021-08-25 PROCEDURE — 272N000001 HC OR GENERAL SUPPLY STERILE: Performed by: INTERNAL MEDICINE

## 2021-08-25 PROCEDURE — 93662 INTRACARDIAC ECG (ICE): CPT | Mod: 26 | Performed by: INTERNAL MEDICINE

## 2021-08-25 PROCEDURE — 36415 COLL VENOUS BLD VENIPUNCTURE: CPT | Performed by: ANESTHESIOLOGY

## 2021-08-25 PROCEDURE — 93580 TRANSCATH CLOSURE OF ASD: CPT | Mod: GC | Performed by: INTERNAL MEDICINE

## 2021-08-25 PROCEDURE — 86900 BLOOD TYPING SEROLOGIC ABO: CPT | Performed by: ANESTHESIOLOGY

## 2021-08-25 PROCEDURE — 93321 DOPPLER ECHO F-UP/LMTD STD: CPT | Mod: 26 | Performed by: INTERNAL MEDICINE

## 2021-08-25 PROCEDURE — 36415 COLL VENOUS BLD VENIPUNCTURE: CPT | Performed by: INTERNAL MEDICINE

## 2021-08-25 PROCEDURE — 255N000002 HC RX 255 OP 636

## 2021-08-25 PROCEDURE — C1725 CATH, TRANSLUMIN NON-LASER: HCPCS | Performed by: INTERNAL MEDICINE

## 2021-08-25 PROCEDURE — 99153 MOD SED SAME PHYS/QHP EA: CPT | Performed by: INTERNAL MEDICINE

## 2021-08-25 PROCEDURE — 80048 BASIC METABOLIC PNL TOTAL CA: CPT | Performed by: INTERNAL MEDICINE

## 2021-08-25 PROCEDURE — C1759 CATH, INTRA ECHOCARDIOGRAPHY: HCPCS | Performed by: INTERNAL MEDICINE

## 2021-08-25 PROCEDURE — 93580 TRANSCATH CLOSURE OF ASD: CPT | Performed by: INTERNAL MEDICINE

## 2021-08-25 PROCEDURE — 250N000013 HC RX MED GY IP 250 OP 250 PS 637: Performed by: STUDENT IN AN ORGANIZED HEALTH CARE EDUCATION/TRAINING PROGRAM

## 2021-08-25 PROCEDURE — 278N000051 HC OR IMPLANT GENERAL: Performed by: INTERNAL MEDICINE

## 2021-08-25 PROCEDURE — 85347 COAGULATION TIME ACTIVATED: CPT

## 2021-08-25 PROCEDURE — 93308 TTE F-UP OR LMTD: CPT | Mod: 26 | Performed by: INTERNAL MEDICINE

## 2021-08-25 PROCEDURE — C1769 GUIDE WIRE: HCPCS | Performed by: INTERNAL MEDICINE

## 2021-08-25 PROCEDURE — 93321 DOPPLER ECHO F-UP/LMTD STD: CPT

## 2021-08-25 PROCEDURE — C1760 CLOSURE DEV, VASC: HCPCS | Performed by: INTERNAL MEDICINE

## 2021-08-25 DEVICE — OCCLUDER CARDIOFORM SEPTAL 25MM: Type: IMPLANTABLE DEVICE | Status: FUNCTIONAL

## 2021-08-25 RX ORDER — SODIUM CHLORIDE 9 MG/ML
INJECTION, SOLUTION INTRAVENOUS CONTINUOUS
Status: DISCONTINUED | OUTPATIENT
Start: 2021-08-25 | End: 2021-08-25 | Stop reason: HOSPADM

## 2021-08-25 RX ORDER — OXYCODONE HYDROCHLORIDE 5 MG/1
5 TABLET ORAL EVERY 4 HOURS PRN
Status: DISCONTINUED | OUTPATIENT
Start: 2021-08-25 | End: 2021-08-25 | Stop reason: HOSPADM

## 2021-08-25 RX ORDER — FENTANYL CITRATE 50 UG/ML
50 INJECTION, SOLUTION INTRAMUSCULAR; INTRAVENOUS EVERY 5 MIN PRN
Status: ACTIVE | OUTPATIENT
Start: 2021-08-25 | End: 2021-08-25

## 2021-08-25 RX ORDER — FENTANYL CITRATE 50 UG/ML
INJECTION, SOLUTION INTRAMUSCULAR; INTRAVENOUS
Status: DISCONTINUED | OUTPATIENT
Start: 2021-08-25 | End: 2021-08-25 | Stop reason: HOSPADM

## 2021-08-25 RX ORDER — CLOPIDOGREL 300 MG/1
600 TABLET, FILM COATED ORAL ONCE
Status: COMPLETED | OUTPATIENT
Start: 2021-08-25 | End: 2021-08-25

## 2021-08-25 RX ORDER — CLOPIDOGREL BISULFATE 75 MG/1
75 TABLET ORAL DAILY
Qty: 90 TABLET | Refills: 0 | Status: SHIPPED | OUTPATIENT
Start: 2021-08-25

## 2021-08-25 RX ORDER — HYDROMORPHONE HYDROCHLORIDE 1 MG/ML
0.5 INJECTION, SOLUTION INTRAMUSCULAR; INTRAVENOUS; SUBCUTANEOUS EVERY 5 MIN PRN
Status: ACTIVE | OUTPATIENT
Start: 2021-08-25 | End: 2021-08-25

## 2021-08-25 RX ORDER — NALOXONE HYDROCHLORIDE 0.4 MG/ML
0.4 INJECTION, SOLUTION INTRAMUSCULAR; INTRAVENOUS; SUBCUTANEOUS
Status: DISCONTINUED | OUTPATIENT
Start: 2021-08-25 | End: 2021-08-25 | Stop reason: HOSPADM

## 2021-08-25 RX ORDER — NALOXONE HYDROCHLORIDE 0.4 MG/ML
0.2 INJECTION, SOLUTION INTRAMUSCULAR; INTRAVENOUS; SUBCUTANEOUS
Status: DISCONTINUED | OUTPATIENT
Start: 2021-08-25 | End: 2021-08-25 | Stop reason: HOSPADM

## 2021-08-25 RX ORDER — LIDOCAINE 40 MG/G
CREAM TOPICAL
Status: DISCONTINUED | OUTPATIENT
Start: 2021-08-25 | End: 2021-08-25 | Stop reason: HOSPADM

## 2021-08-25 RX ORDER — FENTANYL CITRATE 50 UG/ML
25 INJECTION, SOLUTION INTRAMUSCULAR; INTRAVENOUS
Status: DISCONTINUED | OUTPATIENT
Start: 2021-08-25 | End: 2021-08-25 | Stop reason: HOSPADM

## 2021-08-25 RX ORDER — SODIUM CHLORIDE, SODIUM LACTATE, POTASSIUM CHLORIDE, CALCIUM CHLORIDE 600; 310; 30; 20 MG/100ML; MG/100ML; MG/100ML; MG/100ML
INJECTION, SOLUTION INTRAVENOUS CONTINUOUS
Status: DISCONTINUED | OUTPATIENT
Start: 2021-08-25 | End: 2021-08-25 | Stop reason: HOSPADM

## 2021-08-25 RX ORDER — ONDANSETRON 4 MG/1
4 TABLET, ORALLY DISINTEGRATING ORAL EVERY 30 MIN PRN
Status: DISCONTINUED | OUTPATIENT
Start: 2021-08-25 | End: 2021-08-25 | Stop reason: HOSPADM

## 2021-08-25 RX ORDER — PROTAMINE SULFATE 10 MG/ML
INJECTION, SOLUTION INTRAVENOUS
Status: DISCONTINUED | OUTPATIENT
Start: 2021-08-25 | End: 2021-08-25 | Stop reason: HOSPADM

## 2021-08-25 RX ORDER — HEPARIN SODIUM 1000 [USP'U]/ML
INJECTION, SOLUTION INTRAVENOUS; SUBCUTANEOUS
Status: DISCONTINUED | OUTPATIENT
Start: 2021-08-25 | End: 2021-08-25 | Stop reason: HOSPADM

## 2021-08-25 RX ORDER — FLUMAZENIL 0.1 MG/ML
0.2 INJECTION, SOLUTION INTRAVENOUS
Status: DISCONTINUED | OUTPATIENT
Start: 2021-08-25 | End: 2021-08-25 | Stop reason: HOSPADM

## 2021-08-25 RX ORDER — ACETAMINOPHEN 325 MG/1
650 TABLET ORAL EVERY 4 HOURS PRN
Status: DISCONTINUED | OUTPATIENT
Start: 2021-08-25 | End: 2021-08-25 | Stop reason: HOSPADM

## 2021-08-25 RX ORDER — ATROPINE SULFATE 0.1 MG/ML
0.5 INJECTION INTRAVENOUS
Status: DISCONTINUED | OUTPATIENT
Start: 2021-08-25 | End: 2021-08-25 | Stop reason: HOSPADM

## 2021-08-25 RX ORDER — OXYCODONE HYDROCHLORIDE 5 MG/1
10 TABLET ORAL EVERY 4 HOURS PRN
Status: DISCONTINUED | OUTPATIENT
Start: 2021-08-25 | End: 2021-08-25 | Stop reason: HOSPADM

## 2021-08-25 RX ORDER — ONDANSETRON 2 MG/ML
4 INJECTION INTRAMUSCULAR; INTRAVENOUS EVERY 30 MIN PRN
Status: DISCONTINUED | OUTPATIENT
Start: 2021-08-25 | End: 2021-08-25 | Stop reason: HOSPADM

## 2021-08-25 RX ADMIN — HUMAN ALBUMIN MICROSPHERES AND PERFLUTREN 9 ML: 10; .22 INJECTION, SOLUTION INTRAVENOUS at 11:24

## 2021-08-25 RX ADMIN — CLOPIDOGREL BISULFATE 600 MG: 300 TABLET, FILM COATED ORAL at 11:42

## 2021-08-25 ASSESSMENT — MIFFLIN-ST. JEOR: SCORE: 1810.02

## 2021-08-25 ASSESSMENT — ACTIVITIES OF DAILY LIVING (ADL): ADLS_ACUITY_SCORE: 13

## 2021-08-25 NOTE — PROGRESS NOTES
Care Suites Discharge Nursing Note    Patient Information  Name: Ravinder Bardales  Age: 21 year old    Discharge Education:  Discharge instructions reviewed: Yes  Patient/patient representative verbalizes understanding: Yes  Patient discharging on new medications: No  Medication education completed: Yes    Discharge Plans:   Discharge location: home  Discharge ride contacted: Yes  Approximate discharge time: 15:05    Discharge Criteria:  Discharge criteria met and vital signs stable: Yes    Patient Belongs:  Patient belongings returned to patient: Yes

## 2021-08-25 NOTE — DISCHARGE INSTRUCTIONS
Patent Foramen Ovale (PFO)/Atrial Septal Defect (ASD) Closure    After you go home:    For 24 hours    Have an adult stay with you    Relax and take it easy    You may resume your normal diet    Do NOT make any important or legal decisions    Do NOT drink alcohol    No smoking    For 48 hours - due to the sedation you received:    Drink extra fluids    Do NOT drive or operate machines at home or at work    Care of Groin Puncture Site:      For the first 24 hours - check the puncture site every 1-2 hours while awake    For 2 days, when you cough, sneeze, laugh or move your bowels, hold your hand over the puncture site and press firmly    Remove the band aid after 24 hours. If there is minor oozing, apply another band aid and remove it after 12 hours    It is normal to have a small bruise or pea size lump at the site    Do NOT scrub the site and do NOT use lotion or powder near the puncture site    Activity:       For 1 week:    Avoid taking tub baths, swimming, and hot tubs    No lifting greater than 10 lbs    Avoid heavy aerobic activity (walking and stair climbing is ok)    For 6 weeks:    Avoid biking, motorcycle riding, 4 wheelers, boating, and jet skiing    For 3 months:    Avoid contact sports (scuba diving, conner diving, hockey, basketball, football, etc.)     No hunting with a rifle       Bleeding:      If you start bleeding from the site in your groin, lie down flat and press firmly on/above the site for 10 minutes.     Once bleeding stops lay flat for 2 hours.     Call [facility] as soon as you can.       Call 911 right away if you have heavy bleeding or bleeding that does not stop.      Medication:      Take one 81 mg Aspirin with food daily for 6 months. DO NOT discontinue taking the Aspirin without your cardiologist's approval    Take Plavix 75 mg with food daily for 6 months    Antibiotics:      NO elective dental work for 6 months but if you have an urgent matter, inform your dentist: you will need   Antibiotic Prophylaxis  prior to any procedure for 6 months after your closure device was implanted to prevent infection    Follow Up Appointments: (Patient will follow-up when he returns from school break, okay per Dr. Hu)      3 month echocardiogram with bubble study    3 month follow up visit     Call the clinic if:      If your heart feels like it is  fluttering .     You have increased pain or a large or growing hard lump around the site.    The site is red, swollen, hot or tender.    Blood or fluid is draining from the site.    You have chills or a fever greater than 101 F (38 C).    Your leg feels numb, cool or changes color.    You have hives, a rash or unusual itching.    New pain in the back or belly that you cannot control with Tylenol.    Any questions or concerns.      [facility] at [place]:    [phone] [facility] (7 days a week)

## 2021-08-25 NOTE — PROGRESS NOTES
Care Suites Post Procedure Note (PFO Closure)    Patient Information  Name: Ravinder Bardales  Age: 21 year old    Post Procedure  Time patient returned to Care Suites: 10:30 am  Concerns/abnormal assessment: None.  VSS.  Bilateral groin sites CDI, no hematoma.  Plan/Other: Bedrest for four hours.  Monitor patient.    Review discharge instructions.  Discharge to home at approximately 14:30 to 15:00.

## 2021-08-26 ENCOUNTER — DOCUMENTATION ONLY (OUTPATIENT)
Dept: CARDIOLOGY | Facility: CLINIC | Age: 21
End: 2021-08-26

## 2021-08-26 NOTE — PROGRESS NOTES
.  error   Orders being requested: skilled nursing one time a week for 9 week, 3 PRN visits  Reason service is needed/diagnosis: continued infusion therapy support, PICC line change, dressing change and extension tubing change  When are orders needed by: Today  Where to send Orders: Phone:  [phone]  Okay to leave detailed message?  Yes

## 2021-09-19 ENCOUNTER — HEALTH MAINTENANCE LETTER (OUTPATIENT)
Age: 21
End: 2021-09-19

## 2021-12-20 DIAGNOSIS — Q21.12 PFO (PATENT FORAMEN OVALE): ICD-10-CM

## 2021-12-20 DIAGNOSIS — R59.1 LYMPHADENOPATHY: Primary | ICD-10-CM

## 2021-12-21 ENCOUNTER — HOSPITAL ENCOUNTER (OUTPATIENT)
Dept: CARDIOLOGY | Facility: CLINIC | Age: 21
Discharge: HOME | End: 2021-12-21
Attending: INTERNAL MEDICINE | Admitting: INTERNAL MEDICINE
Payer: COMMERCIAL

## 2021-12-21 DIAGNOSIS — Q21.12 PFO (PATENT FORAMEN OVALE): ICD-10-CM

## 2021-12-21 PROCEDURE — 93325 DOPPLER ECHO COLOR FLOW MAPG: CPT | Mod: 26 | Performed by: PEDIATRICS

## 2021-12-21 PROCEDURE — 93320 DOPPLER ECHO COMPLETE: CPT | Mod: 26 | Performed by: PEDIATRICS

## 2021-12-21 PROCEDURE — 93303 ECHO TRANSTHORACIC: CPT | Mod: 26 | Performed by: PEDIATRICS

## 2021-12-21 PROCEDURE — 93306 TTE W/DOPPLER COMPLETE: CPT

## 2021-12-28 ENCOUNTER — CARE COORDINATION (OUTPATIENT)
Dept: CARDIOLOGY | Facility: CLINIC | Age: 21
End: 2021-12-28
Payer: COMMERCIAL

## 2021-12-28 DIAGNOSIS — Z98.890 STATUS POST CORONARY ANGIOGRAM: ICD-10-CM

## 2021-12-28 DIAGNOSIS — Q21.12 PFO (PATENT FORAMEN OVALE): Primary | ICD-10-CM

## 2021-12-28 NOTE — PROGRESS NOTES
Called Ravinder, he missed his appointment with Dr. Hu. She reviewed his ECHO and said everything looks good. I called the patient to let him know. He reports that he had COVID about a year ago and now has a heaviness on his chest and feels more tired than normal. Dr. Hu said if he wants to do a stress test to follow up he can. Orders are placed. He informed me he lives in [place], [place] and I told him he should also follow up with a cardiologist out there. Ravinder verbalized understanding.   Mechelle Allison RN on 12/28/2021 at 12:56 PM

## 2022-01-17 ENCOUNTER — TELEPHONE (OUTPATIENT)
Dept: FAMILY MEDICINE | Facility: CLINIC | Age: 22
End: 2022-01-17
Payer: COMMERCIAL

## 2022-01-17 NOTE — TELEPHONE ENCOUNTER
Reason for Call:  Form, our goal is to have forms completed with 72 hours, however, some forms may require a visit or additional information.    Type of letter, form or note:  medical    Who is the form from?: Patient    Where did the form come from: Patient or family brought in       What clinic location was the form placed at?: [facility]    Where the form was placed: Given to physician    What number is listed as a contact on the form?: [phone]       Additional comments: Please call patient once form has been completed to see how he would like to receive it     Call taken on 1/17/2022 at 9:15 AM by NIKKI OJEDA

## 2022-01-20 NOTE — TELEPHONE ENCOUNTER
See mychart encounter     Forms are completed. Mychart message sent.     A copy of the forms have been sent to be added to the chart.     Awaiting to hear back if patient wants this mailed or picked up at the .    Forms at team TC desk in in box. Marta Loera,

## 2022-07-16 ENCOUNTER — TELEPHONE (OUTPATIENT)
Dept: CARDIOLOGY | Facility: CLINIC | Age: 22
End: 2022-07-16

## 2022-07-16 NOTE — TELEPHONE ENCOUNTER
Called patient to schedule appointments, pt has moved to [place] and started a new job, he will call when he knows of his new schedule and can take time off work.

## 2022-11-21 ENCOUNTER — HEALTH MAINTENANCE LETTER (OUTPATIENT)
Age: 22
End: 2022-11-21

## 2023-11-26 ENCOUNTER — HEALTH MAINTENANCE LETTER (OUTPATIENT)
Age: 23
End: 2023-11-26

## 2025-01-04 ENCOUNTER — HEALTH MAINTENANCE LETTER (OUTPATIENT)
Age: 25
End: 2025-01-04

## (undated) DEVICE — CATH ANGIO 2 SH THNWL 6FR

## (undated) DEVICE — TOTE ANGIO CORP PC15AT SAN32CC83O

## (undated) DEVICE — WIRE GUIDE 0.035"X260CM AMPTLAZ XSTIFF CVD THSCF-35-260-3-A

## (undated) DEVICE — SYR ANGIOGRAPHY MULTIUSE KIT ACIST 014612

## (undated) DEVICE — KIT HAND CONTROL ANGIOTOUCH ACIST 65CM AT-P65

## (undated) DEVICE — CATH US 8FR -90 ACUNAV INTVASC

## (undated) DEVICE — INTRO SHEATH 6FRX10CM PINNACLE RSS602

## (undated) DEVICE — INTRODUCER SHEATH 12FRX12CM FAST-CATH 406128

## (undated) DEVICE — MANIFOLD KIT ANGIO AUTOMATED 014613

## (undated) DEVICE — DILATOR VASC W/INTRO GW 8FRX19CM .038"

## (undated) DEVICE — NDL PERC ENTRY THINWALL 18GA 7.0" G00166

## (undated) DEVICE — CATH BALLOON AMPLATZER SIZING 7FR 27MMX4.5X70CM 9-SB-024

## (undated) DEVICE — INTRODUCER SHEATH FAST-CATH 9FRX12CM 406116

## (undated) DEVICE — SMART CAPNOLINE H PLUS, ADULT/INTERMEDIATE O2, LONG

## (undated) DEVICE — DEFIB PRO-PADZ LVP LQD GEL ADULT 8900-2105-01

## (undated) DEVICE — CLOSURE DEVICE 6FR VASC PROGLIDE MEDICATED SUTURE 12673-03

## (undated) DEVICE — WIRE GLIDE 0.035"X150CM VASC GR3506

## (undated) RX ORDER — HEPARIN SODIUM 200 [USP'U]/100ML
INJECTION, SOLUTION INTRAVENOUS
Status: DISPENSED
Start: 2021-07-13

## (undated) RX ORDER — FENTANYL CITRATE 50 UG/ML
INJECTION, SOLUTION INTRAMUSCULAR; INTRAVENOUS
Status: DISPENSED
Start: 2021-07-13

## (undated) RX ORDER — FENTANYL CITRATE 50 UG/ML
INJECTION, SOLUTION INTRAMUSCULAR; INTRAVENOUS
Status: DISPENSED
Start: 2021-08-25

## (undated) RX ORDER — CLOPIDOGREL 300 MG/1
TABLET, FILM COATED ORAL
Status: DISPENSED
Start: 2021-08-25

## (undated) RX ORDER — HEPARIN SODIUM 1000 [USP'U]/ML
INJECTION, SOLUTION INTRAVENOUS; SUBCUTANEOUS
Status: DISPENSED
Start: 2021-08-25

## (undated) RX ORDER — GLYCOPYRROLATE 0.2 MG/ML
INJECTION, SOLUTION INTRAMUSCULAR; INTRAVENOUS
Status: DISPENSED
Start: 2021-07-07

## (undated) RX ORDER — HEPARIN SODIUM 200 [USP'U]/100ML
INJECTION, SOLUTION INTRAVENOUS
Status: DISPENSED
Start: 2021-08-25

## (undated) RX ORDER — LIDOCAINE HYDROCHLORIDE 10 MG/ML
INJECTION, SOLUTION INFILTRATION; PERINEURAL
Status: DISPENSED
Start: 2021-07-13

## (undated) RX ORDER — LIDOCAINE HYDROCHLORIDE 10 MG/ML
INJECTION, SOLUTION EPIDURAL; INFILTRATION; INTRACAUDAL; PERINEURAL
Status: DISPENSED
Start: 2021-08-25

## (undated) RX ORDER — FENTANYL CITRATE 50 UG/ML
INJECTION, SOLUTION INTRAMUSCULAR; INTRAVENOUS
Status: DISPENSED
Start: 2021-07-07

## (undated) RX ORDER — PROTAMINE SULFATE 10 MG/ML
INJECTION, SOLUTION INTRAVENOUS
Status: DISPENSED
Start: 2021-08-25

## (undated) RX ORDER — LIDOCAINE HYDROCHLORIDE 40 MG/ML
SOLUTION TOPICAL
Status: DISPENSED
Start: 2021-07-07